# Patient Record
Sex: FEMALE | Race: WHITE | Employment: OTHER | URBAN - METROPOLITAN AREA
[De-identification: names, ages, dates, MRNs, and addresses within clinical notes are randomized per-mention and may not be internally consistent; named-entity substitution may affect disease eponyms.]

---

## 2017-01-19 ENCOUNTER — HOSPITAL ENCOUNTER (OUTPATIENT)
Dept: LAB | Age: 82
Discharge: HOME | End: 2017-01-19
Payer: MEDICARE

## 2017-01-19 PROCEDURE — 80053 COMPREHEN METABOLIC PANEL: CPT

## 2017-01-19 PROCEDURE — 83036 HEMOGLOBIN GLYCOSYLATED A1C: CPT

## 2017-01-19 PROCEDURE — 36415 COLL VENOUS BLD VENIPUNCTURE: CPT

## 2017-01-20 ENCOUNTER — HOSPITAL ENCOUNTER (OUTPATIENT)
Dept: LAB | Age: 82
Discharge: HOME | End: 2017-01-20
Payer: MEDICARE

## 2017-01-20 ENCOUNTER — OFFICE VISIT (OUTPATIENT)
Dept: INTERNAL MEDICINE CLINIC | Age: 82
End: 2017-01-20

## 2017-01-20 VITALS
RESPIRATION RATE: 20 BRPM | HEART RATE: 74 BPM | DIASTOLIC BLOOD PRESSURE: 76 MMHG | SYSTOLIC BLOOD PRESSURE: 142 MMHG | BODY MASS INDEX: 35.48 KG/M2 | WEIGHT: 187.9 LBS | TEMPERATURE: 97.9 F | OXYGEN SATURATION: 99 % | HEIGHT: 61 IN

## 2017-01-20 DIAGNOSIS — I95.9 HYPOTENSIVE EPISODE: ICD-10-CM

## 2017-01-20 DIAGNOSIS — E66.01 SEVERE OBESITY (BMI 35.0-39.9): ICD-10-CM

## 2017-01-20 DIAGNOSIS — K21.9 GASTROESOPHAGEAL REFLUX DISEASE WITHOUT ESOPHAGITIS: ICD-10-CM

## 2017-01-20 DIAGNOSIS — I10 HYPERTENSION, ESSENTIAL: ICD-10-CM

## 2017-01-20 DIAGNOSIS — R53.83 FATIGUE, UNSPECIFIED TYPE: Primary | ICD-10-CM

## 2017-01-20 DIAGNOSIS — R82.998 LEUKOCYTES IN URINE: ICD-10-CM

## 2017-01-20 LAB
ALBUMIN SERPL-MCNC: 4.2 G/DL (ref 3.5–4.8)
ALBUMIN/GLOB SERPL: 1.4 {RATIO} (ref 1.1–2.5)
ALP SERPL-CCNC: 87 IU/L (ref 39–117)
ALT SERPL-CCNC: 13 IU/L (ref 0–32)
AST SERPL-CCNC: 17 IU/L (ref 0–40)
BILIRUB SERPL-MCNC: 0.5 MG/DL (ref 0–1.2)
BILIRUB UR QL STRIP: NEGATIVE
BUN SERPL-MCNC: 20 MG/DL (ref 8–27)
BUN/CREAT SERPL: 16 (ref 11–26)
CALCIUM SERPL-MCNC: 10.1 MG/DL (ref 8.7–10.3)
CHLORIDE SERPL-SCNC: 93 MMOL/L (ref 96–106)
CO2 SERPL-SCNC: 29 MMOL/L (ref 18–29)
CREAT SERPL-MCNC: 1.27 MG/DL (ref 0.57–1)
EST. AVERAGE GLUCOSE BLD GHB EST-MCNC: 131 MG/DL
GLOBULIN SER CALC-MCNC: 3.1 G/DL (ref 1.5–4.5)
GLUCOSE SERPL-MCNC: 94 MG/DL (ref 65–99)
GLUCOSE UR-MCNC: NEGATIVE MG/DL
HBA1C MFR BLD: 6.2 % (ref 4.8–5.6)
KETONES P FAST UR STRIP-MCNC: NEGATIVE MG/DL
PH UR STRIP: 7 [PH] (ref 4.6–8)
POTASSIUM SERPL-SCNC: 3.7 MMOL/L (ref 3.5–5.2)
PROT SERPL-MCNC: 7.3 G/DL (ref 6–8.5)
PROT UR QL STRIP: NEGATIVE MG/DL
SODIUM SERPL-SCNC: 137 MMOL/L (ref 134–144)
SP GR UR STRIP: 1.01 (ref 1–1.03)
UA UROBILINOGEN AMB POC: NORMAL (ref 0.2–1)
URINALYSIS CLARITY POC: NORMAL
URINALYSIS COLOR POC: YELLOW
URINE BLOOD POC: NORMAL
URINE LEUKOCYTES POC: NORMAL
URINE NITRITES POC: NEGATIVE

## 2017-01-20 PROCEDURE — 87088 URINE BACTERIA CULTURE: CPT

## 2017-01-20 PROCEDURE — 87086 URINE CULTURE/COLONY COUNT: CPT

## 2017-01-20 PROCEDURE — 87077 CULTURE AEROBIC IDENTIFY: CPT

## 2017-01-20 PROCEDURE — 87186 SC STD MICRODIL/AGAR DIL: CPT

## 2017-01-20 RX ORDER — DOXYCYCLINE 100 MG/1
100 CAPSULE ORAL 2 TIMES DAILY
Qty: 14 CAP | Refills: 0 | Status: SHIPPED | OUTPATIENT
Start: 2017-01-20 | End: 2017-01-23 | Stop reason: SDUPTHER

## 2017-01-20 NOTE — PROGRESS NOTES
Seven Lemos is a 80 y.o. female who was seen in clinic today (1/20/2017). Assessment & Plan:  Charis Us was seen today for blood pressure check. Diagnoses and all orders for this visit:    Fatigue, unspecified type- new dx, reviewed differential, favor UTI. Will start on abx, will check UC.  -     AMB POC URINALYSIS DIP STICK AUTO W/O MICRO  -     doxycycline (VIBRAMYCIN) 100 mg capsule; Take 1 Cap by mouth two (2) times a day for 7 days. Hypotensive episode- unclear etiology but favor due to above, do not think it is related to her BP meds (her concerns about med side effect). Will cont off BP meds until it starts to increase. Will cont to monitor  -     doxycycline (VIBRAMYCIN) 100 mg capsule; Take 1 Cap by mouth two (2) times a day for 7 days. Hypertension, essential- see above    Severe obesity (BMI 35.0-39.9) (HCC)    Gastroesophageal reflux disease without esophagitis    Leukocytes in urine  -     CULTURE, URINE         Follow-up Disposition:  Return if symptoms worsen or fail to improve.       ----------------------------------------------------------------------    Subjective:  Cardiovascular Review  The patient has hypertension. Since last visit: she reports having several episodes of hypotension. She was at the dentist earlier this week. she felt lightheaded when she stood. They checked her BP and it was low. Then 2 days ago she felt weak in the knees. Took her BP several times and reports it was 79/48, 85/50, 91/52, and 95/54. This was between 10pm and 1am.  Next morning she reports 130/90 and 125/81 and she felt fine all day. She did not take her meds yesterday. BP today was L- 124/77 and R- 117/67. She did not take her BP meds today, but she does not feel good. She reports fatigue. Prior to Admission medications    Medication Sig Start Date End Date Taking?  Authorizing Provider   bumetanide (BUMEX) 1 mg tablet Take 1 tablet by mouth two  times daily 12/30/16  Yes Emily Aguilera MD   fluticasone [facility] [place] [place]) 50 mcg/actuation nasal spray Use 2 sprays in each  nostril every day 12/30/16  Yes Emily Aguilera MD   famotidine (PEPCID) 40 mg tablet Take 1 tablet by mouth  daily 12/30/16  Yes Emily Aguilera MD   busPIRone (BUSPAR) 5 mg tablet Take 1 tablet by mouth two  times daily 12/30/16  Yes Emily Aguilera MD   tiZANidine (ZANAFLEX) 2 mg tablet Take 1 tablet by mouth  nightly as needed 12/30/16  Yes Emily Aguilera MD   CINNAMON BARK (CINNAMON PO) Take 1,000 mg by mouth two (2) times a day. Yes Historical Provider   cranberry 500 mg capsule Take 1,000 mg by mouth as needed. Yes Historical Provider   fexofenadine (ALLEGRA) 180 mg tablet Take 90 mg by mouth daily. 1/2 tablet BID   Yes Historical Provider   Comp. Stocking,Knee,Regular,Lrg Misc 2 Each by Does Not Apply route daily. 6/10/13  Yes Emily Aguilera MD   aspirin 81 mg tablet Take 81 mg by mouth daily. 9/30/11  Yes Historical Provider   valsartan-hydroCHLOROthiazide (DIOVAN-HCT) 160-12.5 mg per tablet Take 1 tablet by mouth  daily 12/30/16   Emily Aguilera MD   clobetasol (TEMOVATE) 0.05 % topical cream Apply  to affected area daily as needed for Skin Irritation or Itching. 4/21/16   Emily Aguilera MD   nitroglycerin (NITROSTAT) 0.4 mg SL tablet 1 Tab by SubLINGual route every five (5) minutes as needed for Chest Pain.  5/18/15   Emily Aguilera MD          Allergies   Allergen Reactions    Bactrim [Sulfamethoprim Ds] Rash    Ciprocinonide Other (comments)     Pt states that she is not allergic to this medication    Macrobid [Nitrofurantoin Monohyd/M-Cryst] Unknown (comments)     Flu like      Naprosyn [Naproxen] Hives    Nsaids (Non-Steroidal Anti-Inflammatory Drug) Hives    Prozac [Fluoxetine] Hives    Statins-Hmg-Coa Reductase Inhibitors Myalgia     Did not tolerate simvastatin or atorvastatin    Sulfa (Sulfonamide Antibiotics) Hives    Vioxx [Rofecoxib] Hives GI Upset    Zoloft [Sertraline] Hives           Review of Systems   Constitutional: Negative for chills, fever, malaise/fatigue and weight loss. HENT: Negative for congestion and sore throat. Eyes: Negative for blurred vision. Respiratory: Negative for cough and shortness of breath. Cardiovascular: Negative for chest pain, palpitations and leg swelling. Gastrointestinal: Negative for abdominal pain, constipation, diarrhea, heartburn, nausea and vomiting. Genitourinary: Negative for frequency, hematuria and urgency. She reports having on/off sensation that she will get a UTI (pressure), normally treated w/ increase in water & resolves in a day. She has had this on/off this week   Musculoskeletal: Negative for joint pain and myalgias. Skin: Negative for rash. Neurological: Negative for tingling, focal weakness and headaches. Endo/Heme/Allergies: Does not bruise/bleed easily. Psychiatric/Behavioral: Negative for depression. The patient is not nervous/anxious and does not have insomnia. Objective:   Physical Exam   Constitutional: She appears well-developed. No distress. HENT:   Mouth/Throat: Mucous membranes are normal.   Eyes: Conjunctivae and lids are normal. No scleral icterus. Neck: Neck supple. No thyromegaly present. Cardiovascular: Regular rhythm and normal heart sounds. No murmur heard. Pulmonary/Chest: Effort normal and breath sounds normal. She has no wheezes. She has no rales. Abdominal: Soft. Bowel sounds are normal. She exhibits no mass. There is no hepatosplenomegaly. There is no tenderness. Musculoskeletal: She exhibits no edema. Lymphadenopathy:     She has no cervical adenopathy. Skin: No rash noted. Psychiatric: She has a normal mood and affect.  Her behavior is normal.         Visit Vitals    /76    Pulse 74    Temp 97.9 °F (36.6 °C) (Oral)    Resp 20    Ht 5' 1\" (1.549 m)    Wt 187 lb 14.4 oz (85.2 kg)    SpO2 99%    BMI 35.5 kg/m2         Disclaimer:  Advised her to call back or return to office if symptoms worsen/change/persist.  Discussed expected course/resolution/complications of diagnosis in detail with patient. Medication risks/benefits/costs/interactions/alternatives discussed with patient. She was given an after visit summary which includes diagnoses, current medications, & vitals. She expressed understanding with the diagnosis and plan.         Medardo Flynn MD

## 2017-01-20 NOTE — PROGRESS NOTES
Blood pressure went low on Wednesday, 79/48 to 95/54. Held BP med on Thursday. /77 this morning. Patient instructed per Dr. Gavin Bo, will send urine for culture, Dr. Gavin Bo will review chart today and send Abx to pharmacy, she should hold her BP medication for now, let us know if it increases, and to schedule follow up for 10-14 days. Patient verbalized understanding.

## 2017-01-23 ENCOUNTER — TELEPHONE (OUTPATIENT)
Dept: INTERNAL MEDICINE CLINIC | Age: 82
End: 2017-01-23

## 2017-01-23 RX ORDER — DOXYCYCLINE 100 MG/1
100 CAPSULE ORAL 2 TIMES DAILY
Qty: 14 CAP | Refills: 0 | Status: SHIPPED | OUTPATIENT
Start: 2017-01-23 | End: 2017-01-30

## 2017-01-24 ENCOUNTER — TELEPHONE (OUTPATIENT)
Dept: INTERNAL MEDICINE CLINIC | Age: 82
End: 2017-01-24

## 2017-01-24 LAB — BACTERIA UR CULT: ABNORMAL

## 2017-01-24 NOTE — TELEPHONE ENCOUNTER
Stop the doxycycline. Based on her allergy list I would wait to get the UC before putting her on another abx. Should be another 1-2 days.  Please add this to allergy list.

## 2017-01-24 NOTE — TELEPHONE ENCOUNTER
Spoke with pt who states the doxycycline is not agreeing with her. She states she does not feel good all over all she can do is lay down. She is too sick to go anywhere. Advised pt not to take anymore and wait for Dr Cecile Bello to advise. Pt verbalized understanding. hector

## 2017-01-25 ENCOUNTER — TELEPHONE (OUTPATIENT)
Dept: INTERNAL MEDICINE CLINIC | Age: 82
End: 2017-01-25

## 2017-01-25 RX ORDER — CIPROFLOXACIN 500 MG/1
TABLET ORAL
Qty: 10 TAB | Refills: 0 | Status: SHIPPED | OUTPATIENT
Start: 2017-01-25 | End: 2017-02-01 | Stop reason: ALTCHOICE

## 2017-01-25 NOTE — PROGRESS NOTES
Spoke with patient  Who stated she was had a reaction to Cipro 25 years ago  And after talking to Janki. Gibson Julian he stated for her to ahead and take it. She is to call the office if she has any type of reaction.

## 2017-01-25 NOTE — PROGRESS NOTES
Please call patient. Did have a UTI. resistent to multiple abx & due to her allergies we have limited options. I have her allergic to ciprocinidine, but also a comment that she is not allergic to this. Please clarify.   If this is accurate then start ciprofloxacin 500mg, 1 tab PO BID x 5 days, #10, RF 0

## 2017-01-26 ENCOUNTER — TELEPHONE (OUTPATIENT)
Dept: INTERNAL MEDICINE CLINIC | Age: 82
End: 2017-01-26

## 2017-01-26 NOTE — TELEPHONE ENCOUNTER
She did have a UTI. I think she needs to be treated to prevent this from getting worse. These side effects are more tolerable then the doxycycline. I would recommend continuing the medication to make sure we completed eliminate the infection. She should be on abx for a total of 7 days.

## 2017-01-27 NOTE — TELEPHONE ENCOUNTER
Spoke with pt who states she cannot take the Cipro that made her feel worse than the doxycycline. She states she does not feel that great, she feels exhausted on the doxycycline but would rather take that than the cipro which make her feel worse. Pt has appt on 02/3/17 . Advised pt if she feels really bad just does not feel right to go to Ed. Pt verbalized understanding.

## 2017-01-27 NOTE — TELEPHONE ENCOUNTER
Spoke with pt and notified her that Dr Gavin Bo states doxycyline would be better for her to take due to the side effects that pt can tolerate. Pt verbalized understanding and stated she would take it and realized she has just 5 days left to take it.  Pt stated will make follow up visit for beginning of Feb.

## 2017-01-31 ENCOUNTER — TELEPHONE (OUTPATIENT)
Dept: INTERNAL MEDICINE CLINIC | Age: 82
End: 2017-01-31

## 2017-01-31 DIAGNOSIS — R53.83 FATIGUE, UNSPECIFIED TYPE: Primary | ICD-10-CM

## 2017-02-01 ENCOUNTER — HOSPITAL ENCOUNTER (OUTPATIENT)
Dept: GENERAL RADIOLOGY | Age: 82
Discharge: HOME | End: 2017-02-01
Payer: MEDICARE

## 2017-02-01 ENCOUNTER — HOSPITAL ENCOUNTER (OUTPATIENT)
Dept: LAB | Age: 82
Discharge: HOME | End: 2017-02-01
Payer: MEDICARE

## 2017-02-01 ENCOUNTER — OFFICE VISIT (OUTPATIENT)
Dept: INTERNAL MEDICINE CLINIC | Age: 82
End: 2017-02-01

## 2017-02-01 VITALS
RESPIRATION RATE: 18 BRPM | HEART RATE: 72 BPM | OXYGEN SATURATION: 96 % | SYSTOLIC BLOOD PRESSURE: 140 MMHG | TEMPERATURE: 98 F | DIASTOLIC BLOOD PRESSURE: 66 MMHG

## 2017-02-01 DIAGNOSIS — E83.52 HYPERCALCEMIA: ICD-10-CM

## 2017-02-01 DIAGNOSIS — R68.83 CHILLS (WITHOUT FEVER): ICD-10-CM

## 2017-02-01 DIAGNOSIS — R53.83 FATIGUE, UNSPECIFIED TYPE: ICD-10-CM

## 2017-02-01 DIAGNOSIS — R82.90 ABNORMAL URINALYSIS: ICD-10-CM

## 2017-02-01 DIAGNOSIS — N12 PYELONEPHRITIS: ICD-10-CM

## 2017-02-01 DIAGNOSIS — R53.83 FATIGUE, UNSPECIFIED TYPE: Primary | ICD-10-CM

## 2017-02-01 LAB
BILIRUB UR QL STRIP: NEGATIVE
GLUCOSE UR-MCNC: NEGATIVE MG/DL
KETONES P FAST UR STRIP-MCNC: NEGATIVE MG/DL
PH UR STRIP: 5.5 [PH] (ref 4.6–8)
PROT UR QL STRIP: NEGATIVE MG/DL
SP GR UR STRIP: 1 (ref 1–1.03)
UA UROBILINOGEN AMB POC: NORMAL (ref 0.2–1)
URINALYSIS CLARITY POC: NORMAL
URINALYSIS COLOR POC: YELLOW
URINE BLOOD POC: NORMAL
URINE LEUKOCYTES POC: NORMAL
URINE NITRITES POC: POSITIVE

## 2017-02-01 PROCEDURE — 36415 COLL VENOUS BLD VENIPUNCTURE: CPT

## 2017-02-01 PROCEDURE — 87186 SC STD MICRODIL/AGAR DIL: CPT

## 2017-02-01 PROCEDURE — 71020 XR CHEST PA LAT: CPT

## 2017-02-01 PROCEDURE — 85025 COMPLETE CBC W/AUTO DIFF WBC: CPT

## 2017-02-01 PROCEDURE — 87077 CULTURE AEROBIC IDENTIFY: CPT

## 2017-02-01 PROCEDURE — 87086 URINE CULTURE/COLONY COUNT: CPT

## 2017-02-01 PROCEDURE — 80048 BASIC METABOLIC PNL TOTAL CA: CPT

## 2017-02-01 PROCEDURE — 87088 URINE BACTERIA CULTURE: CPT

## 2017-02-01 NOTE — PATIENT INSTRUCTIONS
Fatigue: Care Instructions  Your Care Instructions  Fatigue is a feeling of tiredness, exhaustion, or lack of energy. You may feel fatigue because of too much or not enough activity. It can also come from stress, lack of sleep, boredom, and poor diet. Many medical problems, such as viral infections, can cause fatigue. Emotional problems, especially depression, are often the cause of fatigue. Fatigue is most often a symptom of another problem. Treatment for fatigue depends on the cause. For example, if you have fatigue because you have a certain health problem, treating this problem also treats your fatigue. If depression or anxiety is the cause, treatment may help. Follow-up care is a key part of your treatment and safety. Be sure to make and go to all appointments, and call your doctor if you are having problems. It's also a good idea to know your test results and keep a list of the medicines you take. How can you care for yourself at home? · Get regular exercise. But don't overdo it. Go back and forth between rest and exercise. · Get plenty of rest.  · Eat a healthy diet. Do not skip meals, especially breakfast.  · Reduce your use of caffeine, tobacco, and alcohol. Caffeine is most often found in coffee, tea, cola drinks, and chocolate. · Limit medicines that can cause fatigue. This includes tranquilizers and cold and allergy medicines. When should you call for help? Watch closely for changes in your health, and be sure to contact your doctor if:  · You have new symptoms such as fever or a rash. · Your fatigue gets worse. · You have been feeling down, depressed, or hopeless. Or you may have lost interest in things that you usually enjoy. · You are not getting better as expected. Where can you learn more? Go to http://franny-shukri.info/. Enter R843 in the search box to learn more about \"Fatigue: Care Instructions. \"  Current as of: May 27, 2016  Content Version: 11.1  © 2246-1000 [employer]. Care instructions adapted under license by [employer] (which disclaims liability or warranty for this information). If you have questions about a medical condition or this instruction, always ask your healthcare professional. [place] [address] any warranty or liability for your use of this information.

## 2017-02-01 NOTE — TELEPHONE ENCOUNTER
ON CALL NOTE:  Pt called reporting fatigue and feeling \"bad\". Has been taking abx. Will stop at this time. Will have her RTC for labs tomorrow. Has appt to f/u with me on Friday. Red flags were reviewed & discussed with the patient to notify me or go to the ED. She verbalized understanding.

## 2017-02-01 NOTE — PROGRESS NOTES
Nima Willis is a 80 y.o. female who was seen in clinic today (2/1/2017). Patient was seen with Dr Rebecca Campbell (R3 at [facility]). Assessment & Plan:  Noé Peña was seen today for fatigue. Diagnoses and all orders for this visit:    Fatigue, unspecified type- worsening, reviewed differential and no obvious cause. UTI should have been treated but UA is still abnormal so will resend UC. Will repeat labs to see if any changes. May need to consider abd/pelvic CT scan. -     CBC WITH AUTOMATED DIFF  -     METABOLIC PANEL, BASIC  -     XR CHEST PA LAT; Future  -     AMB POC URINALYSIS DIP STICK AUTO W/O MICRO    Abnormal urinalysis  -     CULTURE, URINE         Follow-up Disposition:  Return if symptoms worsen or fail to improve.       ----------------------------------------------------------------------    Subjective:  Fatigue  Patient complains of fatigue. She was seen on 1/20. Had a UTI. She was given doxycycline, had issues then switched to ciprofloxacin. She did not tolerate this so went back to doxycycline. She called me last night, notes reviewed. She reports nauseated and just feels bad. She reports having chills in the evening, no sweats. Also reports a new cough. When pain gets really bad she does feel some chest pains. She reports urinary issues have fluctuated. She reports feeling the worst she has ever felt. Prior to Admission medications    Medication Sig Start Date End Date Taking?  Authorizing Provider   bumetanide (BUMEX) 1 mg tablet Take 1 tablet by mouth two  times daily 12/30/16  Yes Bharati Marin MD   valsartan-hydroCHLOROthiazide (DIOVAN-HCT) 160-12.5 mg per tablet Take 1 tablet by mouth  daily 12/30/16  Yes Bharati Marin MD   fluticasone [facility]) 50 mcg/actuation nasal spray Use 2 sprays in each  nostril every day 12/30/16  Yes Bharati Marin MD   famotidine (PEPCID) 40 mg tablet Take 1 tablet by mouth  daily 12/30/16  Yes MD Deacon Brewster (BUSPAR) 5 mg tablet Take 1 tablet by mouth two  times daily 12/30/16  Yes Chiara Lora MD   tiZANidine (ZANAFLEX) 2 mg tablet Take 1 tablet by mouth  nightly as needed 12/30/16  Yes Chiara Lora MD   clobetasol (TEMOVATE) 0.05 % topical cream Apply  to affected area daily as needed for Skin Irritation or Itching. 4/21/16  Yes Chiara Lora MD   nitroglycerin (NITROSTAT) 0.4 mg SL tablet 1 Tab by SubLINGual route every five (5) minutes as needed for Chest Pain. 5/18/15  Yes Chiara Lora MD   CINNAMON BARK (CINNAMON PO) Take 1,000 mg by mouth two (2) times a day. Yes Historical Provider   cranberry 500 mg capsule Take 1,000 mg by mouth as needed. Yes Historical Provider   fexofenadine (ALLEGRA) 180 mg tablet Take 90 mg by mouth daily. 1/2 tablet BID   Yes Historical Provider   Comp. Stocking,Knee,Regular,Lrg Misc 2 Each by Does Not Apply route daily. 6/10/13  Yes Chiara Lora MD   aspirin 81 mg tablet Take 81 mg by mouth daily. 9/30/11  Yes Historical Provider          Allergies   Allergen Reactions    Bactrim [Sulfamethoprim Ds] Rash    Ciprocinonide Other (comments)     Pt states that she is not allergic to this medication    Macrobid [Nitrofurantoin Monohyd/M-Cryst] Unknown (comments)     Flu like      Naprosyn [Naproxen] Hives    Nsaids (Non-Steroidal Anti-Inflammatory Drug) Hives    Prozac [Fluoxetine] Hives    Statins-Hmg-Coa Reductase Inhibitors Myalgia     Did not tolerate simvastatin or atorvastatin    Sulfa (Sulfonamide Antibiotics) Hives    Vioxx [Rofecoxib] Hives     GI Upset    Zoloft [Sertraline] Hives           Review of Systems   Constitutional: Positive for chills and malaise/fatigue. Negative for diaphoresis and fever. Respiratory: Positive for cough. Negative for shortness of breath. Cardiovascular: Positive for chest pain. Negative for palpitations. Gastrointestinal: Negative for constipation, diarrhea, nausea and vomiting. Genitourinary: Negative for frequency, hematuria and urgency. Musculoskeletal: Negative for back pain. Skin: Negative for rash. Objective:   Physical Exam   Constitutional: No distress. HENT:   Right Ear: Tympanic membrane is not erythematous and not bulging. No middle ear effusion. Left Ear: Tympanic membrane is not erythematous and not bulging. No middle ear effusion. Nose: No mucosal edema or rhinorrhea. Right sinus exhibits no maxillary sinus tenderness and no frontal sinus tenderness. Left sinus exhibits no maxillary sinus tenderness and no frontal sinus tenderness. Mouth/Throat: Uvula is midline and mucous membranes are normal. No oropharyngeal exudate or posterior oropharyngeal erythema. Eyes: Conjunctivae are normal. No scleral icterus. Neck: Neck supple. Cardiovascular: Regular rhythm and normal heart sounds. No murmur heard. Pulmonary/Chest: Effort normal and breath sounds normal. She has no wheezes. She has no rales. Abdominal: Bowel sounds are normal. She exhibits no mass. There is no hepatosplenomegaly. There is no tenderness. Musculoskeletal: She exhibits no edema. Lymphadenopathy:     She has no cervical adenopathy. Psychiatric: She has a normal mood and affect. Her behavior is normal.         Visit Vitals    /66    Pulse 72    Temp 98 °F (36.7 °C) (Oral)    Resp 18    SpO2 96%         Disclaimer:  Advised her to call back or return to office if symptoms worsen/change/persist.  Discussed expected course/resolution/complications of diagnosis in detail with patient. Medication risks/benefits/costs/interactions/alternatives discussed with patient. She was given an after visit summary which includes diagnoses, current medications, & vitals. She expressed understanding with the diagnosis and plan.         Nissa Madrid MD

## 2017-02-01 NOTE — LETTER
2/3/2017 8:44 AM 
 
Ms. Nkechi [facility] [id number] Dear Ms. Ed Mcguire Your lab slip is enclosed as discussed. Sincerely, Thalia Webster RN

## 2017-02-02 LAB
BASOPHILS # BLD AUTO: 0.1 X10E3/UL (ref 0–0.2)
BASOPHILS NFR BLD AUTO: 1 %
BUN SERPL-MCNC: 29 MG/DL (ref 8–27)
BUN/CREAT SERPL: 29 (ref 11–26)
CALCIUM SERPL-MCNC: 11.2 MG/DL (ref 8.7–10.3)
CHLORIDE SERPL-SCNC: 86 MMOL/L (ref 96–106)
CO2 SERPL-SCNC: 29 MMOL/L (ref 18–29)
CREAT SERPL-MCNC: 1.01 MG/DL (ref 0.57–1)
EOSINOPHIL # BLD AUTO: 0.8 X10E3/UL (ref 0–0.4)
EOSINOPHIL NFR BLD AUTO: 8 %
ERYTHROCYTE [DISTWIDTH] IN BLOOD BY AUTOMATED COUNT: 13.6 % (ref 12.3–15.4)
GLUCOSE SERPL-MCNC: 95 MG/DL (ref 65–99)
HCT VFR BLD AUTO: 35.6 % (ref 34–46.6)
HGB BLD-MCNC: 12.3 G/DL (ref 11.1–15.9)
IMM GRANULOCYTES # BLD: 0 X10E3/UL (ref 0–0.1)
IMM GRANULOCYTES NFR BLD: 0 %
LYMPHOCYTES # BLD AUTO: 3.4 X10E3/UL (ref 0.7–3.1)
LYMPHOCYTES NFR BLD AUTO: 34 %
MCH RBC QN AUTO: 29.9 PG (ref 26.6–33)
MCHC RBC AUTO-ENTMCNC: 34.6 G/DL (ref 31.5–35.7)
MCV RBC AUTO: 87 FL (ref 79–97)
MONOCYTES # BLD AUTO: 1 X10E3/UL (ref 0.1–0.9)
MONOCYTES NFR BLD AUTO: 10 %
NEUTROPHILS # BLD AUTO: 4.7 X10E3/UL (ref 1.4–7)
NEUTROPHILS NFR BLD AUTO: 47 %
PLATELET # BLD AUTO: 350 X10E3/UL (ref 150–379)
POTASSIUM SERPL-SCNC: 3.3 MMOL/L (ref 3.5–5.2)
RBC # BLD AUTO: 4.11 X10E6/UL (ref 3.77–5.28)
SODIUM SERPL-SCNC: 133 MMOL/L (ref 134–144)
WBC # BLD AUTO: 10 X10E3/UL (ref 3.4–10.8)

## 2017-02-02 NOTE — PROGRESS NOTES
Please call patient. CBC normal.  Renal fxn improved. Multiple electrolyte changes (Ca, K, and Na). I'm sure this is related to her not feeling good (possible medication side effect). Would repeat CMP next week (dx: hypercalcemia).   I would recommend CT abd/pelvis (dx: r/o pyelonephritis)

## 2017-02-03 ENCOUNTER — TELEPHONE (OUTPATIENT)
Dept: INTERNAL MEDICINE CLINIC | Age: 82
End: 2017-02-03

## 2017-02-03 LAB — BACTERIA UR CULT: ABNORMAL

## 2017-02-03 NOTE — PROGRESS NOTES
Call to patient. Advised of Dr. Olvin Mir' note and recommendations. Request I mail lab slip to her. Advised I would call to schedule CT for Monday. She would like to have done at [facility].

## 2017-02-03 NOTE — PROGRESS NOTES
CT scan scheduled for Monday, 2 pm at [facility], [address], [place]. She will arrive 2 hours early for contrast. No Nsaids. NPO 4 hours prior to procedure. Drink clear liquids. Patient verbalized understanding.

## 2017-02-03 NOTE — TELEPHONE ENCOUNTER
Patient called and reviewed previous & current UC. UC is unchanged. We have no PO options due to allergies & medications she can't tolerate (recently treated w/ ciprofloxacin and doxycyline). Recommended to head to [facility] [place] for admission & IV abx. Reviewed I can't directly admit her since she was not seen today. She wants to sleep on it, feels slightly better. Will cancel CT scan. Favor her symptoms are mostly UTI related.

## 2017-02-03 NOTE — PROGRESS NOTES
Please call patient. UC growing same bacteria as last time. Due to allergies only treatments are IM/IV. We could try levofloxacin (but did not tolerate ciprofloxacin). Could do Ceftriaxone IM in the office but could not start until Monday. Would recommend going to [facility] [place], admission for IV abx.   Favor all her symptoms are UTI related

## 2017-02-04 ENCOUNTER — HOSPITAL ENCOUNTER (EMERGENCY)
Age: 82
Discharge: HOME | End: 2017-02-04
Attending: EMERGENCY MEDICINE
Payer: MEDICARE

## 2017-02-04 ENCOUNTER — TELEPHONE (OUTPATIENT)
Dept: INTERNAL MEDICINE CLINIC | Age: 82
End: 2017-02-04

## 2017-02-04 VITALS
SYSTOLIC BLOOD PRESSURE: 125 MMHG | RESPIRATION RATE: 18 BRPM | TEMPERATURE: 97.8 F | OXYGEN SATURATION: 99 % | BODY MASS INDEX: 34.93 KG/M2 | WEIGHT: 185 LBS | DIASTOLIC BLOOD PRESSURE: 59 MMHG | HEART RATE: 60 BPM | HEIGHT: 61 IN

## 2017-02-04 DIAGNOSIS — R82.71 BACTERIURIA WITH PYURIA: ICD-10-CM

## 2017-02-04 DIAGNOSIS — E87.6 HYPOKALEMIA: Primary | ICD-10-CM

## 2017-02-04 DIAGNOSIS — R82.81 BACTERIURIA WITH PYURIA: ICD-10-CM

## 2017-02-04 LAB
ALBUMIN SERPL BCP-MCNC: 4.3 G/DL (ref 3.5–5)
ALBUMIN/GLOB SERPL: 1 {RATIO} (ref 1.1–2.2)
ALP SERPL-CCNC: 105 U/L (ref 45–117)
ALT SERPL-CCNC: 22 U/L (ref 12–78)
ANION GAP BLD CALC-SCNC: 10 MMOL/L (ref 5–15)
APPEARANCE UR: CLEAR
AST SERPL W P-5'-P-CCNC: 24 U/L (ref 15–37)
BACTERIA URNS QL MICRO: ABNORMAL /HPF
BASOPHILS # BLD AUTO: 0.1 K/UL (ref 0–0.1)
BASOPHILS # BLD: 1 % (ref 0–1)
BILIRUB SERPL-MCNC: 0.8 MG/DL (ref 0.2–1)
BILIRUB UR QL: NEGATIVE
BUN SERPL-MCNC: 34 MG/DL (ref 6–20)
BUN/CREAT SERPL: 29 (ref 12–20)
CALCIUM SERPL-MCNC: 9.9 MG/DL (ref 8.5–10.1)
CHLORIDE SERPL-SCNC: 90 MMOL/L (ref 97–108)
CO2 SERPL-SCNC: 34 MMOL/L (ref 21–32)
COLOR UR: ABNORMAL
CREAT SERPL-MCNC: 1.16 MG/DL (ref 0.55–1.02)
DIFFERENTIAL METHOD BLD: ABNORMAL
EOSINOPHIL # BLD: 0.5 K/UL (ref 0–0.4)
EOSINOPHIL NFR BLD: 6 % (ref 0–7)
EPITH CASTS URNS QL MICRO: ABNORMAL /LPF
ERYTHROCYTE [DISTWIDTH] IN BLOOD BY AUTOMATED COUNT: 13.2 % (ref 11.5–14.5)
GLOBULIN SER CALC-MCNC: 4.1 G/DL (ref 2–4)
GLUCOSE SERPL-MCNC: 99 MG/DL (ref 65–100)
GLUCOSE UR STRIP.AUTO-MCNC: NEGATIVE MG/DL
HCT VFR BLD AUTO: 36.1 % (ref 35–47)
HGB BLD-MCNC: 12.3 G/DL (ref 11.5–16)
HGB UR QL STRIP: ABNORMAL
HYALINE CASTS URNS QL MICRO: ABNORMAL /LPF (ref 0–5)
KETONES UR QL STRIP.AUTO: NEGATIVE MG/DL
LEUKOCYTE ESTERASE UR QL STRIP.AUTO: ABNORMAL
LYMPHOCYTES # BLD AUTO: 33 % (ref 12–49)
LYMPHOCYTES # BLD: 2.9 K/UL (ref 0.8–3.5)
MCH RBC QN AUTO: 30.3 PG (ref 26–34)
MCHC RBC AUTO-ENTMCNC: 34.1 G/DL (ref 30–36.5)
MCV RBC AUTO: 88.9 FL (ref 80–99)
MONOCYTES # BLD: 0.9 K/UL (ref 0–1)
MONOCYTES NFR BLD AUTO: 10 % (ref 5–13)
NEUTS SEG # BLD: 4.3 K/UL (ref 1.8–8)
NEUTS SEG NFR BLD AUTO: 50 % (ref 32–75)
NITRITE UR QL STRIP.AUTO: NEGATIVE
PH UR STRIP: 6.5 [PH] (ref 5–8)
PLATELET # BLD AUTO: 351 K/UL (ref 150–400)
POTASSIUM SERPL-SCNC: 2.9 MMOL/L (ref 3.5–5.1)
PROT SERPL-MCNC: 8.4 G/DL (ref 6.4–8.2)
PROT UR STRIP-MCNC: NEGATIVE MG/DL
RBC # BLD AUTO: 4.06 M/UL (ref 3.8–5.2)
RBC #/AREA URNS HPF: ABNORMAL /HPF (ref 0–5)
RBC MORPH BLD: ABNORMAL
SODIUM SERPL-SCNC: 134 MMOL/L (ref 136–145)
SP GR UR REFRACTOMETRY: 1.01 (ref 1–1.03)
UA: UC IF INDICATED,UAUC: ABNORMAL
UROBILINOGEN UR QL STRIP.AUTO: 0.2 EU/DL (ref 0.2–1)
WBC # BLD AUTO: 8.7 K/UL (ref 3.6–11)
WBC MORPH BLD: ABNORMAL
WBC URNS QL MICRO: ABNORMAL /HPF (ref 0–4)

## 2017-02-04 PROCEDURE — 36415 COLL VENOUS BLD VENIPUNCTURE: CPT | Performed by: EMERGENCY MEDICINE

## 2017-02-04 PROCEDURE — 87086 URINE CULTURE/COLONY COUNT: CPT | Performed by: EMERGENCY MEDICINE

## 2017-02-04 PROCEDURE — 80053 COMPREHEN METABOLIC PANEL: CPT | Performed by: EMERGENCY MEDICINE

## 2017-02-04 PROCEDURE — 99283 EMERGENCY DEPT VISIT LOW MDM: CPT

## 2017-02-04 PROCEDURE — 87186 SC STD MICRODIL/AGAR DIL: CPT | Performed by: EMERGENCY MEDICINE

## 2017-02-04 PROCEDURE — 85025 COMPLETE CBC W/AUTO DIFF WBC: CPT | Performed by: EMERGENCY MEDICINE

## 2017-02-04 PROCEDURE — 81001 URINALYSIS AUTO W/SCOPE: CPT | Performed by: EMERGENCY MEDICINE

## 2017-02-04 RX ORDER — POTASSIUM CHLORIDE 20 MEQ/1
20 TABLET, EXTENDED RELEASE ORAL 2 TIMES DAILY
Qty: 30 TAB | Refills: 0 | Status: SHIPPED | OUTPATIENT
Start: 2017-02-04 | End: 2017-02-19

## 2017-02-04 NOTE — ED TRIAGE NOTES
Referred by Dr. Aylin Childress to get IV antibiotic for UTI.  \"I've been on 2 antibiotics but can't complete because it makes me sick\"

## 2017-02-04 NOTE — ED PROVIDER NOTES
HPI Comments: 80 y.o. female with past medical history significant for gout, arthritis, HTN, GERD, colonic polyps, hypercholesterolemia, plantar fascitis, chronic fatigue, chronic anxiety, CAD, ABIODUN, MARY JANE, and CHI who presents to the ED with chief complaint of referral. Patient reports chest tightness with recent onset. Patient reports a UTI with onset ~2-3 weeks ago. Patient reports being treated with antibiotics by her PCP, with no relief. Patient reports being referred to the ED by her PCP for IV antibiotics. Patient reports needing to urinate ~2-3 times per night at baseline. Patient reports a history of a cardiac stent placement ~5 years ago; reports the procedure was performed by Dr. Qiana Jarquin MD. Patient reports being on HTN and diuretic medications. Patient reports CPAP use at night. Patient reports decreased sleep \"last night. \" Patient denies fever, dysuria, hematuria, and urinary frequency. There are no other acute medical concerns at this time. 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  Hx tonsil and adenoidectomy      Hx heent  3/2002     jarod. laser eye surg.     Hx orthopaedic       Right wrist cyst    Endoscopy, colon, diagnostic  5/1999     ([facility])    Hx mohs procedure Right 4/28/04    Hx heart catheterization  2/12     s/p stent    Hx other surgical  5/15/14     PAP- normal         Family History:   Problem Relation Age of Onset    Coronary Artery Disease Mother     Hypertension Mother     Heart Failure Mother      CHF    Diabetes Father     Heart Disease Father     Hypertension Sister     Heart Disease Sister     Stroke Sister     Heart Surgery Sister     Heart Disease Brother     Diabetes Brother     No Known Problems Daughter     No Known Problems Daughter     No Known Problems Daughter     No Known Problems Son        Social History     Social History    Marital status:      Spouse name: N/A    Number of children: N/A    Years of education: N/A     Occupational History    Not on file. Social History Main Topics    Smoking status: Former Smoker     Packs/day: 0.30     Years: 5.00     Quit date: 7/2/1970    Smokeless tobacco: Never Used    Alcohol use No    Drug use: No    Sexual activity: No     Other Topics Concern    Not on file     Social History Narrative         ALLERGIES: Bactrim [sulfamethoprim ds]; Ciprocinonide; Macrobid [nitrofurantoin monohyd/m-cryst]; Naprosyn [naproxen]; Nsaids (non-steroidal anti-inflammatory drug); Prozac [fluoxetine]; Statins-hmg-coa reductase inhibitors; Sulfa (sulfonamide antibiotics); Vioxx [rofecoxib]; and Zoloft [sertraline]    Review of Systems   Constitutional: Negative for appetite change, chills and fever. HENT: Negative for rhinorrhea, sore throat and trouble swallowing. Eyes: Negative for photophobia. Respiratory: Positive for chest tightness. Negative for cough and shortness of breath. Cardiovascular: Negative for palpitations. Gastrointestinal: Negative for abdominal pain, nausea and vomiting. Genitourinary: Negative for dysuria, frequency and hematuria. Musculoskeletal: Negative for arthralgias. Neurological: Negative for dizziness, syncope and weakness.    Psychiatric/Behavioral: Negative for behavioral problems. The patient is not nervous/anxious. All other systems reviewed and are negative. Vitals:    02/04/17 1336   BP: 148/74   Pulse: 65   Resp: 17   Temp: 97.8 °F (36.6 °C)   SpO2: 99%   Weight: 83.9 kg (185 lb)   Height: 5' 1\" (1.549 m)            Physical Exam   Physical Exam   Constitutional: She appears well-developed and well-nourished. HENT:   Head: Normocephalic and atraumatic. Mouth/Throat: Oropharynx is clear and moist.   Eyes: EOM are normal. Pupils are equal, round, and reactive to light. Neck: Normal range of motion. Neck supple. Cardiovascular: Normal rate, regular rhythm, normal heart sounds and intact distal pulses. Exam reveals no gallop and no friction rub. No murmur heard. Pulmonary/Chest: Effort normal. No respiratory distress. She has no wheezes. She has no rales. Abdominal: Soft. There is no tenderness. There is no rebound. Musculoskeletal: Normal range of motion. She exhibits no tenderness. Neurological: She is alert. No cranial nerve deficit. Motor; symmetric   Skin: No erythema. Psychiatric: She has a normal mood and affect. Her behavior is normal.   Nursing note and vitals reviewed. Note written by Karine Srivastava, as dictated by Alex Solares MD 2:23 PM   [facility]  ED Course       Procedures    Note: Patient had some urgency several weeks ago and took at least one course of antibiotics. Most recently her urine culture grew out Enterobacter which is susceptible to cephalosporins. Symptoms today include insomnia and a feeling like she may jump out of her skin. She has baseline nocturia x3. She went into the bathroom several times during the night with the urge to urinate and did not urinate. At this time she seems to have minimal symptoms associated with her urinary tract. I feel like her infection may be asymptomatic. Labs and urine are pending but I think we can hold off on giving her antibiotics at this time.   Joni Greenfield Nahomy Benavides MD  2:21 PM

## 2017-02-04 NOTE — DISCHARGE INSTRUCTIONS
Hypokalemia: Care Instructions  Your Care Instructions  Hypokalemia (say \"ed-gt-hop-ALIVIA-geena-uh\") is a low level of potassium. The heart, muscles, kidneys, and nervous system all need potassium to work well. This problem has many different causes. Kidney problems, diet, and medicines like diuretics and laxatives can cause it. So can vomiting or diarrhea. In some cases, cancer is the cause. Your doctor may do tests to find the cause of your low potassium levels. You may need medicines to bring your potassium levels back to normal. You may also need regular blood tests to check your potassium. If you have very low potassium, you may need intravenous (IV) medicines. You also may need tests to check the electrical activity of your heart. Heart problems caused by low potassium levels can be very serious. Follow-up care is a key part of your treatment and safety. Be sure to make and go to all appointments, and call your doctor if you are having problems. It's also a good idea to know your test results and keep a list of the medicines you take. How can you care for yourself at home? · If your doctor recommends it, eat foods that have a lot of potassium. These include fresh fruits, juices, and vegetables. They also include nuts, beans, and milk. · Be safe with medicines. If your doctor prescribes medicines or potassium supplements, take them exactly as directed. Call your doctor if you have any problems with your medicines. · Get your potassium levels tested as often as your doctor tells you. When should you call for help? Call 911 anytime you think you may need emergency care. For example, call if:  · You feel like your heart is missing beats. Heart problems caused by low potassium can cause death. · You passed out (lost consciousness). · You have a seizure. Call your doctor now or seek immediate medical care if:  · You feel weak or unusually tired. · You have severe arm or leg cramps.   · You have tingling or numbness. · You feel sick to your stomach, or you vomit. · You have belly cramps. · You feel bloated or constipated. · You have to urinate a lot. · You feel very thirsty most of the time. · You are dizzy or lightheaded, or you feel like you may faint. · You feel depressed, or you lose touch with reality. Watch closely for changes in your health, and be sure to contact your doctor if:  · You do not get better as expected. Where can you learn more? Go to http://franny-shukri.info/. Enter G358 in the search box to learn more about \"Hypokalemia: Care Instructions. \"  Current as of: July 28, 2016  Content Version: 11.1  © 1597-6514 [employer]. Care instructions adapted under license by [facility] [employer] (which disclaims liability or warranty for this information). If you have questions about a medical condition or this instruction, always ask your healthcare professional. [place] [address] any warranty or liability for your use of this information. We hope that we have addressed all of your medical concerns. The examination and treatment you received in the Emergency Department were for an emergent problem and were not intended as complete care. It is important that you follow up with your healthcare provider(s) for ongoing care. If your symptoms worsen or do not improve as expected, and you are unable to reach your usual health care provider(s), you should return to the Emergency Department. Today's healthcare is undergoing tremendous change, and patient satisfaction surveys are one of the many tools to assess the quality of medical care. You may receive a survey from the [employer] organization regarding your experience in the Emergency Department. I hope that your experience has been completely positive, particularly the medical care that I provided.   As such, please participate in the survey; anything less than excellent does not meet my expectations or intentions. [facility] and [address] [place] [address] participate in nationally recognized quality of care measures. If your blood pressure is greater than 120/80, as reported below, we urge that you seek medical care to address the potential of high blood pressure, commonly known as hypertension. Hypertension can be hereditary or can be caused by certain medical conditions, pain, stress, or \"white coat syndrome. \"       Please make an appointment with your health care provider(s) for follow up of your Emergency Department visit. VITALS:   Patient Vitals for the past 8 hrs:   Temp Pulse Resp BP SpO2   02/04/17 1336 97.8 °F (36.6 °C) 65 17 148/74 99 %          Thank you for allowing us to provide you with medical care today. We realize that you have many choices for your emergency care needs. Please choose us in the future for any continued health care needs. Alayne Osler, MD    [facility].   Office: [phone]            Recent Results (from the past 24 hour(s))   CBC WITH AUTOMATED DIFF    Collection Time: 02/04/17  1:54 PM   Result Value Ref Range    WBC 8.7 3.6 - 11.0 K/uL    RBC 4.06 3.80 - 5.20 M/uL    HGB 12.3 11.5 - 16.0 g/dL    HCT 36.1 35.0 - 47.0 %    MCV 88.9 80.0 - 99.0 FL    MCH 30.3 26.0 - 34.0 PG    MCHC 34.1 30.0 - 36.5 g/dL    RDW 13.2 11.5 - 14.5 %    PLATELET 159 701 - 658 K/uL    NEUTROPHILS 50 32 - 75 %    LYMPHOCYTES 33 12 - 49 %    MONOCYTES 10 5 - 13 %    EOSINOPHILS 6 0 - 7 %    BASOPHILS 1 0 - 1 %    ABS. NEUTROPHILS 4.3 1.8 - 8.0 K/UL    ABS. LYMPHOCYTES 2.9 0.8 - 3.5 K/UL    ABS. MONOCYTES 0.9 0.0 - 1.0 K/UL    ABS. EOSINOPHILS 0.5 (H) 0.0 - 0.4 K/UL    ABS.  BASOPHILS 0.1 0.0 - 0.1 K/UL    DF SMEAR SCANNED      RBC COMMENTS NORMOCYTIC, NORMOCHROMIC      WBC COMMENTS REACTIVE LYMPHS     METABOLIC PANEL, COMPREHENSIVE    Collection Time: 02/04/17  1:54 PM   Result Value Ref Range    Sodium 134 (L) 136 - 145 mmol/L    Potassium 2.9 (L) 3.5 - 5.1 mmol/L    Chloride 90 (L) 97 - 108 mmol/L    CO2 34 (H) 21 - 32 mmol/L    Anion gap 10 5 - 15 mmol/L    Glucose 99 65 - 100 mg/dL    BUN 34 (H) 6 - 20 MG/DL    Creatinine 1.16 (H) 0.55 - 1.02 MG/DL    BUN/Creatinine ratio 29 (H) 12 - 20      GFR est AA 55 (L) >60 ml/min/1.73m2    GFR est non-AA 45 (L) >60 ml/min/1.73m2    Calcium 9.9 8.5 - 10.1 MG/DL    Bilirubin, total 0.8 0.2 - 1.0 MG/DL    ALT (SGPT) 22 12 - 78 U/L    AST (SGOT) 24 15 - 37 U/L    Alk. phosphatase 105 45 - 117 U/L    Protein, total 8.4 (H) 6.4 - 8.2 g/dL    Albumin 4.3 3.5 - 5.0 g/dL    Globulin 4.1 (H) 2.0 - 4.0 g/dL    A-G Ratio 1.0 (L) 1.1 - 2.2     URINALYSIS W/ REFLEX CULTURE    Collection Time: 02/04/17  2:20 PM   Result Value Ref Range    Color YELLOW/STRAW      Appearance CLEAR CLEAR      Specific gravity 1.007 1.003 - 1.030      pH (UA) 6.5 5.0 - 8.0      Protein NEGATIVE  NEG mg/dL    Glucose NEGATIVE  NEG mg/dL    Ketone NEGATIVE  NEG mg/dL    Bilirubin NEGATIVE  NEG      Blood TRACE (A) NEG      Urobilinogen 0.2 0.2 - 1.0 EU/dL    Nitrites NEGATIVE  NEG      Leukocyte Esterase SMALL (A) NEG      WBC 20-50 0 - 4 /hpf    RBC 0-5 0 - 5 /hpf    Epithelial cells FEW FEW /lpf    Bacteria 2+ (A) NEG /hpf    UA:UC IF INDICATED URINE CULTURE ORDERED (A) CNI      Hyaline cast 0-2 0 - 5 /lpf       No results found.

## 2017-02-04 NOTE — TELEPHONE ENCOUNTER
On call: pt was instructed to go to ER for UTI by Dr Roger West yesterday due to urine culture showing enterobacter and unable to treat with oral medications. She calls from hospital admitting requesting orders. She is instructed to go to ER for evaluation/admit for UTI.

## 2017-02-04 NOTE — ED NOTES
Physical Exam   Constitutional: She appears well-developed and well-nourished. HENT:   Head: Normocephalic and atraumatic. Mouth/Throat: Oropharynx is clear and moist.   Eyes: EOM are normal. Pupils are equal, round, and reactive to light. Neck: Normal range of motion. Neck supple. Cardiovascular: Normal rate, regular rhythm, normal heart sounds and intact distal pulses. Exam reveals no gallop and no friction rub. No murmur heard. Pulmonary/Chest: Effort normal. No respiratory distress. She has no wheezes. She has no rales. Abdominal: Soft. There is no tenderness. There is no rebound. Musculoskeletal: Normal range of motion. She exhibits no tenderness. Neurological: She is alert. No cranial nerve deficit. Motor; symmetric   Skin: No erythema. Psychiatric: She has a normal mood and affect. Her behavior is normal.   Nursing note and vitals reviewed.    Note written by Karine Cooney, as dictated by Yolanda Barragan MD 2:23 PM

## 2017-02-06 ENCOUNTER — OFFICE VISIT (OUTPATIENT)
Dept: INTERNAL MEDICINE CLINIC | Age: 82
End: 2017-02-06

## 2017-02-06 ENCOUNTER — TELEPHONE (OUTPATIENT)
Dept: INTERNAL MEDICINE CLINIC | Age: 82
End: 2017-02-06

## 2017-02-06 VITALS
TEMPERATURE: 98 F | SYSTOLIC BLOOD PRESSURE: 132 MMHG | DIASTOLIC BLOOD PRESSURE: 68 MMHG | HEIGHT: 61 IN | HEART RATE: 72 BPM | WEIGHT: 185 LBS | BODY MASS INDEX: 34.93 KG/M2 | RESPIRATION RATE: 16 BRPM | OXYGEN SATURATION: 99 %

## 2017-02-06 DIAGNOSIS — N30.00 ACUTE CYSTITIS WITHOUT HEMATURIA: Primary | ICD-10-CM

## 2017-02-06 NOTE — TELEPHONE ENCOUNTER
Spoke with pt who stated she went to ED for uti and she saw Ed physician who decided not to give pt abx IV because he stated he felt she had had been on enough abx. Physician did decide to give Pt potassium because pt potassium level was low. Pt is scheduled for appt today with Dr Sarah Salazar to discuss her uti.

## 2017-02-06 NOTE — CALL BACK NOTE
[facility] Emergency Department Follow Up Call Record    Discharged to : Home/Family Home/Home Health/Skilled Facility/Rehab/Assisted Living/Other___home____  1) Did you receive your discharge instructions? YES        2) Do you understand them? YES         3) Are you able to follow them? YES Appointment for February 13, 2017 with PCP. If NO, what can I clarify for you? 4) Do you understand your diagnosis? YES         5) Do you know which symptoms should prompt you to call the doctor? YES     6) Were you able to fill and  any medications that were prescribed? YES     7) You were prescribed __potassium_________for _low k+ level 2.9___________________. Common side effects of this medication are__rash, headache__________________. This is not a complete list so please review the forms given from the pharmacy for a complete list.      8) Are there any questions about your medications? NO            Have you scheduled any recommended doctors appointments (specialty, PCP) YES  If NO, what barriers are you encountering (transportation/lost contact info/cost/  didnt think necessary/no PCP  9) If discharged with Home Health, has the agency contacted you to schedule visit? NO  10) Is there anyone available to help you at home (meals, errands, transportation    monitoring) (adult children, neighbors, private duty companions) YES    11) Are you on a special diet? NO         If YES, do you understand the requirements for this diet? Education provided? 12) If presented with cough, bronchitis, COPD, asthma, is it ok to ask that the   respiratory disease management educator call you? NOT APPLICABLE      13)  A) If presented with fall, were you issued an assistive device in the ED    Are you using? NOT APPLICABLE  B) If given RX for device, have you obtained? NOT APPLICABLE       If NO, barriers? C) Therapist recommended: NO   Are you able to implement the suggestions?  NOT APPLICABLE        If NO, barriers to implementation? D) Are you having any difficulties with mobility inside your home?     (steps, bed, tub)NO   If YES, ask if the [facility] PT can contact patient and good time and number?  14)  At the end of your discharge instructions, there is information about accessing [facility], have you had a chance to review those? NO         Do you have any questions about signing up for this service? We encourage our patients to be active participants in their healthcare and this site is one of the ways to do that. It will allow you to access parts of your medical record, email your doctors office, schedule appointments, and request medications refills . 15) Are there any other questions that I can answer for you regarding    your Emergency department visit? YES         Avinash Saldaña Fuzzy reports that she did not like staying in the ED department, \"Cold\" on the stretcher for 3 plus hours. She felt like she soul have received the \"shots\" she needed for urinary tract infection. Avinash Saldaña reports still feeling poorly today. Encouraged that she call her PCP office and try to get appointment sooner than Feb 13. She does report taking the potassium as ordered.               Estimated Call Time:_____11:06 AM  ______________ Date/Time:_______________

## 2017-02-06 NOTE — PROGRESS NOTES
Mario Hernandez is a 80 y.o. female who was seen in clinic today (2/6/2017). Assessment & Plan:  Elmira Habermann was seen today for hospital follow up. Diagnoses and all orders for this visit:    Acute cystitis without hematuria- unchanged, I spent 15 minutes with the patient and >50% of the time was spent reviewing w/ her & her  our options for treatment. She is still feeling \"bad\" and so far the only abnormality has been her abnormal UC. Again reviewed our only options are IV abx at this time. She refuses to go back through the ER due to the ER physician not reading her chart. I agreed. Case d/w [facility] hospitalist but there is issues w/ available beds. She will be sent home and will try to get directly admitted to [facility] or [facility] tomorrow. Will hold off on any meds. Follow-up Disposition: Not on File         ----------------------------------------------------------------------    Subjective:  Hospital Follow Up  Mario Hernandez is seen for follow up from recent ED visit to [facility] on 2/4/17. She is accompanied by her . We reviewed the the records. She was told to go to the ER to be admitted for IV abx due to UTI that has not responded to outpatient abx (due to side effects) and having limited options due to allergies. She has had 3 urine cultures, all growing the same bacteria (ENTEROBACTER AEROGENES). ED physician deemed this not to be the cause of her symptoms and discharged her. She still reports fatigue and on/off lightheaded. She reports urinary frequency & urgency. No dysuria or hematuria. She reports she is still just feeling \"bad in general\"        Prior to Admission medications    Medication Sig Start Date End Date Taking? Authorizing Provider   potassium chloride (K-DUR, KLOR-CON) 20 mEq tablet Take 1 Tab by mouth two (2) times a day for 15 days.  2/4/17 2/19/17 Yes Consuella Pavy, MD   bumetanide (BUMEX) 1 mg tablet Take 1 tablet by mouth two  times daily 12/30/16  Yes Tate Cox MD   valsartan-hydroCHLOROthiazide (DIOVAN-HCT) 160-12.5 mg per tablet Take 1 tablet by mouth  daily 12/30/16  Yes Tate Cox MD   fluticasone Patrick Marrow) 50 mcg/actuation nasal spray Use 2 sprays in each  nostril every day 12/30/16  Yes Tate Cox MD   famotidine (PEPCID) 40 mg tablet Take 1 tablet by mouth  daily 12/30/16  Yes Tate Cox MD   busPIRone (BUSPAR) 5 mg tablet Take 1 tablet by mouth two  times daily 12/30/16  Yes Tate Cox MD   tiZANidine (ZANAFLEX) 2 mg tablet Take 1 tablet by mouth  nightly as needed 12/30/16  Yes Tate Cox MD   clobetasol (TEMOVATE) 0.05 % topical cream Apply  to affected area daily as needed for Skin Irritation or Itching. 4/21/16  Yes Tate Cox MD   nitroglycerin (NITROSTAT) 0.4 mg SL tablet 1 Tab by SubLINGual route every five (5) minutes as needed for Chest Pain. 5/18/15  Yes Tate Cox MD   CINNAMON BARK (CINNAMON PO) Take 1,000 mg by mouth two (2) times a day. Yes Historical Provider   cranberry 500 mg capsule Take 1,000 mg by mouth as needed. Yes Historical Provider   fexofenadine (ALLEGRA) 180 mg tablet Take 90 mg by mouth daily. 1/2 tablet BID   Yes Historical Provider   Comp. Stocking,Knee,Regular,Lrg Misc 2 Each by Does Not Apply route daily. 6/10/13  Yes Tate Cox MD   aspirin 81 mg tablet Take 81 mg by mouth daily. 9/30/11  Yes Historical Provider          Allergies   Allergen Reactions    Bactrim [Sulfamethoprim Ds] Rash    Ciprocinonide Other (comments)     Pt states that she is not allergic to this medication.   Did not tolerate Cipro (2017) - fatigue & aches    Macrobid [Nitrofurantoin Monohyd/M-Cryst] Unknown (comments)     Flu like      Naprosyn [Naproxen] Hives    Nsaids (Non-Steroidal Anti-Inflammatory Drug) Hives    Prozac [Fluoxetine] Hives    Statins-Hmg-Coa Reductase Inhibitors Myalgia     Did not tolerate simvastatin or atorvastatin    Sulfa (Sulfonamide Antibiotics) Hives    Vioxx [Rofecoxib] Hives     GI Upset    Zoloft [Sertraline] Hives           ROS: per HPI      Objective:   Physical Exam: deferred       Visit Vitals    /68    Pulse 72    Temp 98 °F (36.7 °C) (Oral)    Resp 16    Ht 5' 1\" (1.549 m)    Wt 185 lb (83.9 kg)    SpO2 99%    BMI 34.96 kg/m2         Disclaimer:  Advised her to call back or return to office if symptoms worsen/change/persist.  Discussed expected course/resolution/complications of diagnosis in detail with patient. Medication risks/benefits/costs/interactions/alternatives discussed with patient. She was given an after visit summary which includes diagnoses, current medications, & vitals. She expressed understanding with the diagnosis and plan.         Aria Morales MD

## 2017-02-06 NOTE — TELEPHONE ENCOUNTER
Pt called back and stated she will not come in today to see Dr Cristhian Whitfield her  is not well and due to traffic late in afternoon. Pt states she will go ahead and take the potassium and will follow up with Dr Cristhian Whitfield on 2/20/17.

## 2017-02-07 ENCOUNTER — TELEPHONE (OUTPATIENT)
Dept: INTERNAL MEDICINE CLINIC | Age: 82
End: 2017-02-07

## 2017-02-07 ENCOUNTER — HOSPITAL ENCOUNTER (OUTPATIENT)
Dept: INFUSION THERAPY | Age: 82
Discharge: HOME | End: 2017-02-07
Payer: MEDICARE

## 2017-02-07 VITALS
TEMPERATURE: 97.1 F | HEART RATE: 62 BPM | SYSTOLIC BLOOD PRESSURE: 133 MMHG | RESPIRATION RATE: 18 BRPM | DIASTOLIC BLOOD PRESSURE: 60 MMHG

## 2017-02-07 DIAGNOSIS — N39.0 URINARY TRACT INFECTION, SITE UNSPECIFIED: Primary | ICD-10-CM

## 2017-02-07 LAB
BACTERIA SPEC CULT: NORMAL
CC UR VC: NORMAL
SERVICE CMNT-IMP: NORMAL

## 2017-02-07 PROCEDURE — 74011250636 HC RX REV CODE- 250/636: Performed by: INTERNAL MEDICINE

## 2017-02-07 PROCEDURE — 96365 THER/PROPH/DIAG IV INF INIT: CPT

## 2017-02-07 PROCEDURE — 74011000258 HC RX REV CODE- 258: Performed by: INTERNAL MEDICINE

## 2017-02-07 RX ADMIN — CEFTRIAXONE SODIUM 1 G: 1 INJECTION, POWDER, FOR SOLUTION INTRAMUSCULAR; INTRAVENOUS at 15:36

## 2017-02-07 NOTE — PROGRESS NOTES
1515 Pt arrived at [facility] ambulatory and in no distress for Rocephin. Assessment completed, no new complaints voiced. Rocephin discussed. Visit Vitals    /60    Pulse 62    Temp 97.1 °F (36.2 °C)    Resp 18       Medications received:  Rocephin 1 gm IV    1640 Pt monitored 30 minutes post primary infusion. Tolerated treatment well, no adverse reaction noted. Discharge instructions given. IV d/c'd. D/Cd from [facility] ambulatory and in no distress accompanied by spouse.   Next appt 2/8

## 2017-02-08 ENCOUNTER — HOSPITAL ENCOUNTER (OUTPATIENT)
Dept: INFUSION THERAPY | Age: 82
Discharge: HOME | End: 2017-02-08
Payer: MEDICARE

## 2017-02-08 VITALS
TEMPERATURE: 98 F | HEART RATE: 57 BPM | SYSTOLIC BLOOD PRESSURE: 113 MMHG | RESPIRATION RATE: 18 BRPM | DIASTOLIC BLOOD PRESSURE: 66 MMHG

## 2017-02-08 PROCEDURE — 96365 THER/PROPH/DIAG IV INF INIT: CPT

## 2017-02-08 PROCEDURE — 74011250636 HC RX REV CODE- 250/636: Performed by: INTERNAL MEDICINE

## 2017-02-08 PROCEDURE — 74011000258 HC RX REV CODE- 258: Performed by: INTERNAL MEDICINE

## 2017-02-08 RX ADMIN — CEFTRIAXONE SODIUM 1 G: 1 INJECTION, POWDER, FOR SOLUTION INTRAMUSCULAR; INTRAVENOUS at 13:09

## 2017-02-08 NOTE — PROGRESS NOTES
[facility] short consult note:    4653 Pt arrived to [facility] ambulatory and in no distress for Rocephin. Denies any new complaints. IV established in left AC. Visit Vitals    /66    Pulse (!) 57    Temp 98 °F (36.7 °C)    Resp 18     Medication given:  Rocephin 1 gm IV    1340  Discharged home ambulatory and in no distress. Tolerated procedure well.  Next appointment 2/9

## 2017-02-09 ENCOUNTER — HOSPITAL ENCOUNTER (OUTPATIENT)
Dept: INFUSION THERAPY | Age: 82
Discharge: HOME | End: 2017-02-09
Payer: MEDICARE

## 2017-02-09 VITALS
SYSTOLIC BLOOD PRESSURE: 130 MMHG | DIASTOLIC BLOOD PRESSURE: 71 MMHG | RESPIRATION RATE: 18 BRPM | TEMPERATURE: 98.5 F | HEART RATE: 61 BPM

## 2017-02-09 PROCEDURE — 74011250636 HC RX REV CODE- 250/636: Performed by: INTERNAL MEDICINE

## 2017-02-09 PROCEDURE — 96365 THER/PROPH/DIAG IV INF INIT: CPT

## 2017-02-09 PROCEDURE — 74011000258 HC RX REV CODE- 258: Performed by: INTERNAL MEDICINE

## 2017-02-09 RX ADMIN — CEFTRIAXONE SODIUM 1 G: 1 INJECTION, POWDER, FOR SOLUTION INTRAMUSCULAR; INTRAVENOUS at 10:50

## 2017-02-09 NOTE — PROGRESS NOTES
1045 Pt arrived to [facility] ambulatory. Pt denies any distress or discomfort at this time. PIV started in left wrist. Blood return noted. Flushed without difficulty. Visit Vitals    /71 (BP 1 Location: Left arm, BP Patient Position: At rest)    Pulse 61    Temp 98.5 °F (36.9 °C)    Resp 18         1050 Rocephin 1 gm given     1125 PIV flushed with normal saline and secured. Pt denies any distress or discomfort at this time.  Pt discharged home ambulatory with next apt

## 2017-02-10 ENCOUNTER — TELEPHONE (OUTPATIENT)
Dept: SLEEP MEDICINE | Age: 82
End: 2017-02-10

## 2017-02-10 ENCOUNTER — HOSPITAL ENCOUNTER (OUTPATIENT)
Dept: INFUSION THERAPY | Age: 82
Discharge: HOME | End: 2017-02-10
Payer: MEDICARE

## 2017-02-10 VITALS
DIASTOLIC BLOOD PRESSURE: 69 MMHG | OXYGEN SATURATION: 98 % | SYSTOLIC BLOOD PRESSURE: 129 MMHG | TEMPERATURE: 97.8 F | RESPIRATION RATE: 18 BRPM | HEART RATE: 59 BPM

## 2017-02-10 DIAGNOSIS — G47.33 OSA (OBSTRUCTIVE SLEEP APNEA): Primary | ICD-10-CM

## 2017-02-10 PROCEDURE — 74011000258 HC RX REV CODE- 258: Performed by: INTERNAL MEDICINE

## 2017-02-10 PROCEDURE — 96365 THER/PROPH/DIAG IV INF INIT: CPT

## 2017-02-10 PROCEDURE — 74011250636 HC RX REV CODE- 250/636: Performed by: INTERNAL MEDICINE

## 2017-02-10 RX ORDER — SODIUM CHLORIDE 9 MG/ML
25 INJECTION, SOLUTION INTRAVENOUS AS NEEDED
Status: DISPENSED | OUTPATIENT
Start: 2017-02-11 | End: 2017-02-11

## 2017-02-10 RX ORDER — SODIUM CHLORIDE 0.9 % (FLUSH) 0.9 %
5-10 SYRINGE (ML) INJECTION AS NEEDED
Status: CANCELLED | OUTPATIENT
Start: 2017-02-11

## 2017-02-10 RX ORDER — SODIUM CHLORIDE 0.9 % (FLUSH) 0.9 %
5-10 SYRINGE (ML) INJECTION AS NEEDED
Status: DISCONTINUED | OUTPATIENT
Start: 2017-02-11 | End: 2017-02-14 | Stop reason: HOSPADM

## 2017-02-10 RX ORDER — SODIUM CHLORIDE 0.9 % (FLUSH) 0.9 %
10-40 SYRINGE (ML) INJECTION AS NEEDED
Status: ACTIVE | OUTPATIENT
Start: 2017-02-10 | End: 2017-02-11

## 2017-02-10 RX ORDER — SODIUM CHLORIDE 9 MG/ML
25 INJECTION, SOLUTION INTRAVENOUS AS NEEDED
Status: CANCELLED | OUTPATIENT
Start: 2017-02-11 | End: 2017-02-11

## 2017-02-10 RX ADMIN — CEFTRIAXONE SODIUM 1 G: 1 INJECTION, POWDER, FOR SOLUTION INTRAMUSCULAR; INTRAVENOUS at 11:28

## 2017-02-10 RX ADMIN — Medication 10 ML: at 11:20

## 2017-02-11 ENCOUNTER — HOSPITAL ENCOUNTER (OUTPATIENT)
Dept: INFUSION THERAPY | Age: 82
Discharge: HOME | End: 2017-02-11
Payer: MEDICARE

## 2017-02-11 VITALS
TEMPERATURE: 98.3 F | HEART RATE: 65 BPM | DIASTOLIC BLOOD PRESSURE: 75 MMHG | RESPIRATION RATE: 18 BRPM | SYSTOLIC BLOOD PRESSURE: 144 MMHG

## 2017-02-11 PROCEDURE — 74011250636 HC RX REV CODE- 250/636: Performed by: INTERNAL MEDICINE

## 2017-02-11 PROCEDURE — 74011000258 HC RX REV CODE- 258: Performed by: INTERNAL MEDICINE

## 2017-02-11 PROCEDURE — 96365 THER/PROPH/DIAG IV INF INIT: CPT

## 2017-02-11 RX ORDER — SODIUM CHLORIDE 9 MG/ML
25 INJECTION, SOLUTION INTRAVENOUS AS NEEDED
Status: DISPENSED | OUTPATIENT
Start: 2017-02-11 | End: 2017-02-12

## 2017-02-11 RX ORDER — SODIUM CHLORIDE 0.9 % (FLUSH) 0.9 %
5-10 SYRINGE (ML) INJECTION AS NEEDED
Status: DISCONTINUED | OUTPATIENT
Start: 2017-02-11 | End: 2017-02-15 | Stop reason: HOSPADM

## 2017-02-11 RX ADMIN — Medication 10 ML: at 11:50

## 2017-02-11 RX ADMIN — Medication 10 ML: at 11:20

## 2017-02-11 RX ADMIN — SODIUM CHLORIDE 1 G: 900 INJECTION, SOLUTION INTRAVENOUS at 11:25

## 2017-02-13 ENCOUNTER — OFFICE VISIT (OUTPATIENT)
Dept: INTERNAL MEDICINE CLINIC | Age: 82
End: 2017-02-13

## 2017-02-13 ENCOUNTER — DOCUMENTATION ONLY (OUTPATIENT)
Dept: SLEEP MEDICINE | Age: 82
End: 2017-02-13

## 2017-02-13 ENCOUNTER — HOSPITAL ENCOUNTER (OUTPATIENT)
Dept: LAB | Age: 82
Discharge: HOME | End: 2017-02-13
Payer: MEDICARE

## 2017-02-13 VITALS
DIASTOLIC BLOOD PRESSURE: 64 MMHG | WEIGHT: 185 LBS | SYSTOLIC BLOOD PRESSURE: 126 MMHG | HEIGHT: 61 IN | RESPIRATION RATE: 18 BRPM | BODY MASS INDEX: 34.93 KG/M2 | TEMPERATURE: 97.8 F | HEART RATE: 70 BPM | OXYGEN SATURATION: 99 %

## 2017-02-13 DIAGNOSIS — R53.83 FATIGUE, UNSPECIFIED TYPE: ICD-10-CM

## 2017-02-13 DIAGNOSIS — N30.00 ACUTE CYSTITIS WITHOUT HEMATURIA: Primary | ICD-10-CM

## 2017-02-13 DIAGNOSIS — G47.33 OBSTRUCTIVE SLEEP APNEA (ADULT) (PEDIATRIC): ICD-10-CM

## 2017-02-13 PROCEDURE — 80048 BASIC METABOLIC PNL TOTAL CA: CPT

## 2017-02-13 PROCEDURE — 36415 COLL VENOUS BLD VENIPUNCTURE: CPT

## 2017-02-13 NOTE — PROGRESS NOTES
Nkechi Moya is a 80 y.o. female who was seen in clinic today (2/13/2017). Assessment & Plan:  Katerina Olivas was seen today for bladder infection. Diagnoses and all orders for this visit:    Acute cystitis without hematuria- hopefully resolved, symptoms improved, will repeat UC. Fatigue, unspecified type- improved, reviewed again likely multi-factorial.  Partly was UTI related, but also likely related to CPAP. Cont to f/u with specialist.  Reviewed pros/cons to equipment. Obstructive sleep apnea (adult) (pediatric)    BMI 34.0-34.9,adult         Follow-up Disposition: Not on File       ----------------------------------------------------------------------    Subjective:  Patient RTC to f/u on fatigue. Since last visit she has completed 5 days of IV abx for her UTI through the infusion center. She reports she is feeling better. Urinary issues are back to normal (still having frequency, but this is her baseline). She also wants to talk about MARY JANE and changing sleep machines (getting a portable one). She reports mask is leaking on/off. Prior to Admission medications    Medication Sig Start Date End Date Taking?  Authorizing Provider   bumetanide (BUMEX) 1 mg tablet Take 1 tablet by mouth two  times daily 12/30/16  Yes James Raza MD   valsartan-hydroCHLOROthiazide (DIOVAN-HCT) 160-12.5 mg per tablet Take 1 tablet by mouth  daily 12/30/16  Yes James Raza MD   fluticasone [facility]) 50 mcg/actuation nasal spray Use 2 sprays in each  nostril every day 12/30/16  Yes James Raza MD   famotidine (PEPCID) 40 mg tablet Take 1 tablet by mouth  daily 12/30/16  Yes James Raza MD   busPIRone (BUSPAR) 5 mg tablet Take 1 tablet by mouth two  times daily 12/30/16  Yes James Raza MD   tiZANidine (ZANAFLEX) 2 mg tablet Take 1 tablet by mouth  nightly as needed 12/30/16  Yes James Raza MD   clobetasol (TEMOVATE) 0.05 % topical cream Apply  to affected area daily as needed for Skin Irritation or Itching. 4/21/16  Yes Cosmo Felix MD   nitroglycerin (NITROSTAT) 0.4 mg SL tablet 1 Tab by SubLINGual route every five (5) minutes as needed for Chest Pain. 5/18/15  Yes Cosmo Felix MD   CINNAMON BARK (CINNAMON PO) Take 1,000 mg by mouth two (2) times a day. Yes Historical Provider   cranberry 500 mg capsule Take 1,000 mg by mouth as needed. Yes Historical Provider   fexofenadine (ALLEGRA) 180 mg tablet Take 90 mg by mouth daily. 1/2 tablet BID   Yes Historical Provider   Comp. Stocking,Knee,Regular,Lrg Misc 2 Each by Does Not Apply route daily. 6/10/13  Yes Cosmo Felix MD   aspirin 81 mg tablet Take 81 mg by mouth daily. 9/30/11  Yes Historical Provider   potassium chloride (K-DUR, KLOR-CON) 20 mEq tablet Take 1 Tab by mouth two (2) times a day for 15 days. 2/4/17 2/19/17  Ely Lebron MD          Allergies   Allergen Reactions    Bactrim [Sulfamethoprim Ds] Rash    Ciprocinonide Other (comments)     Pt states that she is not allergic to this medication. Did not tolerate Cipro (2017) - fatigue & aches    Macrobid [Nitrofurantoin Monohyd/M-Cryst] Unknown (comments)     Flu like      Naprosyn [Naproxen] Hives    Nsaids (Non-Steroidal Anti-Inflammatory Drug) Hives    Prozac [Fluoxetine] Hives    Statins-Hmg-Coa Reductase Inhibitors Myalgia     Did not tolerate simvastatin or atorvastatin    Sulfa (Sulfonamide Antibiotics) Hives    Vioxx [Rofecoxib] Hives     GI Upset    Zoloft [Sertraline] Hives           Review of Systems   Constitutional: Negative for malaise/fatigue. Respiratory: Negative for cough and shortness of breath. Cardiovascular: Negative for chest pain and palpitations. Gastrointestinal: Negative for abdominal pain, constipation, diarrhea, nausea and vomiting. Genitourinary: Positive for frequency. Negative for dysuria, flank pain, hematuria and urgency.          Objective:   Physical Exam      Visit Vitals    /64    Pulse 70    Temp 97.8 °F (36.6 °C) (Oral)    Resp 18    Ht 5' 1\" (1.549 m)    Wt 185 lb (83.9 kg)    SpO2 99%    BMI 34.96 kg/m2         Disclaimer:  Advised her to call back or return to office if symptoms worsen/change/persist.  Discussed expected course/resolution/complications of diagnosis in detail with patient. Medication risks/benefits/costs/interactions/alternatives discussed with patient. She was given an after visit summary which includes diagnoses, current medications, & vitals. She expressed understanding with the diagnosis and plan.         Reina Orta MD

## 2017-02-14 LAB
BUN SERPL-MCNC: 20 MG/DL (ref 8–27)
BUN/CREAT SERPL: 19 (ref 11–26)
CALCIUM SERPL-MCNC: 9.9 MG/DL (ref 8.7–10.3)
CHLORIDE SERPL-SCNC: 92 MMOL/L (ref 96–106)
CO2 SERPL-SCNC: 25 MMOL/L (ref 18–29)
CREAT SERPL-MCNC: 1.08 MG/DL (ref 0.57–1)
GLUCOSE SERPL-MCNC: 87 MG/DL (ref 65–99)
POTASSIUM SERPL-SCNC: 4.1 MMOL/L (ref 3.5–5.2)
SODIUM SERPL-SCNC: 135 MMOL/L (ref 134–144)

## 2017-02-17 ENCOUNTER — TELEPHONE (OUTPATIENT)
Dept: INTERNAL MEDICINE CLINIC | Age: 82
End: 2017-02-17

## 2017-02-17 DIAGNOSIS — K21.9 GASTROESOPHAGEAL REFLUX DISEASE WITHOUT ESOPHAGITIS: ICD-10-CM

## 2017-02-17 RX ORDER — BUMETANIDE 1 MG/1
TABLET ORAL
Qty: 180 TAB | Refills: 1 | Status: SHIPPED | OUTPATIENT
Start: 2017-02-17 | End: 2017-06-28 | Stop reason: ALTCHOICE

## 2017-02-17 RX ORDER — VALSARTAN AND HYDROCHLOROTHIAZIDE 160; 12.5 MG/1; MG/1
TABLET, FILM COATED ORAL
Qty: 90 TAB | Refills: 1 | Status: SHIPPED | OUTPATIENT
Start: 2017-02-17 | End: 2017-07-10 | Stop reason: SDUPTHER

## 2017-02-17 RX ORDER — FAMOTIDINE 40 MG/1
TABLET, FILM COATED ORAL
Qty: 90 TAB | Refills: 1 | Status: SHIPPED | OUTPATIENT
Start: 2017-02-17 | End: 2017-07-10 | Stop reason: SDUPTHER

## 2017-02-17 NOTE — TELEPHONE ENCOUNTER
Call to [employer], spoke with [address] [place] [address]. Urine not obtained. Was attached to first page and missed. Dr. Aylin Childress notified.

## 2017-02-17 NOTE — PROGRESS NOTES
Please call patient. Labs are at baseline. SANDRA never collected or sent a urine sample. I would like her to return to a [facility] [address] next week (early next week, no specific day) to repeat her urine study to make sure the UTI has resolved. We can reprint order in the system.

## 2017-02-20 NOTE — PROGRESS NOTES
Call to patient. Advised needs urine retested early this week, (not next week-letter not sent). Order for urine testing faxed to [facility] on [id number] [address], [phone], per patient request, confirmation received. Patient wanted to let Dr. Keila Morgan know that she has received new harness for CPAP and that she is sleeping much better.

## 2017-02-21 ENCOUNTER — HOSPITAL ENCOUNTER (OUTPATIENT)
Dept: LAB | Age: 82
Discharge: HOME | End: 2017-02-21
Payer: MEDICARE

## 2017-02-21 PROCEDURE — 81001 URINALYSIS AUTO W/SCOPE: CPT

## 2017-02-21 PROCEDURE — 87086 URINE CULTURE/COLONY COUNT: CPT

## 2017-02-23 LAB
APPEARANCE UR: CLEAR
BACTERIA #/AREA URNS HPF: ABNORMAL /[HPF]
BACTERIA UR CULT: NORMAL
BILIRUB UR QL STRIP: NEGATIVE
CASTS URNS QL MICRO: ABNORMAL /LPF
COLOR UR: YELLOW
EPI CELLS #/AREA URNS HPF: ABNORMAL /HPF
GLUCOSE UR QL: NEGATIVE
HGB UR QL STRIP: ABNORMAL
KETONES UR QL STRIP: NEGATIVE
LEUKOCYTE ESTERASE UR QL STRIP: ABNORMAL
MICRO URNS: ABNORMAL
NITRITE UR QL STRIP: NEGATIVE
PH UR STRIP: 6.5 [PH] (ref 5–7.5)
PROT UR QL STRIP: NEGATIVE
RBC #/AREA URNS HPF: ABNORMAL /HPF
SP GR UR: 1.01 (ref 1–1.03)
UROBILINOGEN UR STRIP-MCNC: 0.2 MG/DL (ref 0.2–1)
WBC #/AREA URNS HPF: ABNORMAL /HPF

## 2017-02-23 NOTE — PROGRESS NOTES
Please call patient. UC negative (infection resolved) but UA still borderline abnormal (monitor for now). K is improved, cont KCl supplement.

## 2017-02-24 RX ORDER — POTASSIUM CHLORIDE 20 MEQ/1
20 TABLET, EXTENDED RELEASE ORAL 2 TIMES DAILY
Qty: 60 TAB | Refills: 1 | Status: SHIPPED | OUTPATIENT
Start: 2017-02-24 | End: 2017-04-29 | Stop reason: SDUPTHER

## 2017-02-24 NOTE — TELEPHONE ENCOUNTER
Return call to patient. Advised of Dr. Palak Waldron' note and recommendations. Patient verbalized understanding. Stated she was out of potassium.

## 2017-02-24 NOTE — TELEPHONE ENCOUNTER
Cont on med for now.   Can get labs repeated w/ cardiologist next month to determine if she needs to stay on it

## 2017-02-27 ENCOUNTER — TELEPHONE (OUTPATIENT)
Dept: GYNECOLOGY | Age: 82
End: 2017-02-27

## 2017-02-27 DIAGNOSIS — Z12.31 ENCOUNTER FOR SCREENING MAMMOGRAM FOR MALIGNANT NEOPLASM OF BREAST: Primary | ICD-10-CM

## 2017-03-09 ENCOUNTER — HOSPITAL ENCOUNTER (OUTPATIENT)
Dept: MAMMOGRAPHY | Age: 82
Discharge: HOME | End: 2017-03-09
Attending: OBSTETRICS & GYNECOLOGY
Payer: MEDICARE

## 2017-03-09 DIAGNOSIS — Z12.31 ENCOUNTER FOR SCREENING MAMMOGRAM FOR MALIGNANT NEOPLASM OF BREAST: ICD-10-CM

## 2017-03-09 PROCEDURE — 77067 SCR MAMMO BI INCL CAD: CPT

## 2017-03-20 ENCOUNTER — OFFICE VISIT (OUTPATIENT)
Dept: GYNECOLOGY | Age: 82
End: 2017-03-20

## 2017-03-20 ENCOUNTER — HOSPITAL ENCOUNTER (OUTPATIENT)
Dept: LAB | Age: 82
Discharge: HOME | End: 2017-03-20
Payer: MEDICARE

## 2017-03-20 VITALS
WEIGHT: 184.2 LBS | HEART RATE: 69 BPM | DIASTOLIC BLOOD PRESSURE: 71 MMHG | HEIGHT: 61 IN | BODY MASS INDEX: 34.78 KG/M2 | SYSTOLIC BLOOD PRESSURE: 148 MMHG

## 2017-03-20 DIAGNOSIS — Z91.89 GYN EXAM FOR HIGH-RISK MEDICARE PATIENT: Primary | ICD-10-CM

## 2017-03-20 DIAGNOSIS — L90.0 LICHEN SCLEROSUS: ICD-10-CM

## 2017-03-20 PROCEDURE — 88142 CYTOPATH C/V THIN LAYER: CPT | Performed by: OBSTETRICS & GYNECOLOGY

## 2017-03-20 NOTE — PROGRESS NOTES
[address], [address] [facility]  [phone]  MD Zaida Gilbert MD    Patient ID:  Dirk Parry  [id number]  1935/81 y.o. Visit date: 3/20/2017    INTERVAL HISTORY: Dirk Parry is a  female with a history of ABIODUN. The patient has noted a prior history of pruritis vulvitis and inframammary dermatitis. The patient presents for ongoing Evaluation at a 12 month interval.    Last Cytology: 5/15/2014    Recent UTI refractory to PO antibiotics/allergic to sulfa. Rx IV Rocephin    Imaging history: Mammogram due   Chemotherapy history: None  Ongoing cardiology followup. Active, restricted by recent ankle injury. Negative  and GI review. Negative cardiopulmonary review. Patient denies any abnormal bleeding or vaginal discharge. Weight stable. 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  HX HEART CATHETERIZATION  2/12    s/p stent    HX HEENT  3/2002    jarod. laser eye surg.     HX MOHS PROCEDURES Right 4/28/04    HX ORTHOPAEDIC      Right TKR ([place])    HX ORTHOPAEDIC      Right wrist cyst    HX OTHER SURGICAL  5/15/14    PAP- normal    HX TONSIL AND ADENOIDECTOMY         Social History     Social History    Marital status:      Spouse name: N/A    Number of children: N/A    Years of education: N/A     Occupational History    Not on file. Social History Main Topics    Smoking status: Former Smoker     Packs/day: 0.30     Years: 5.00     Quit date: 7/2/1970    Smokeless tobacco: Never Used    Alcohol use No    Drug use: No    Sexual activity: No     Other Topics Concern    Not on file     Social History Narrative       Family History   Problem Relation Age of Onset    Coronary Artery Disease Mother     Hypertension Mother     Heart Failure Mother      CHF    Diabetes Father     Heart Disease Father     Hypertension Sister     Heart Disease Sister     Stroke Sister     Heart Surgery Sister     Heart Disease Brother     Diabetes Brother     No Known Problems Daughter     No Known Problems Daughter     No Known Problems Daughter     No Known Problems Son     Breast Cancer Paternal Grandmother        Current Outpatient Prescriptions on File Prior to Visit   Medication Sig Dispense Refill    potassium chloride (K-DUR, KLOR-CON) 20 mEq tablet Take 1 Tab by mouth two (2) times a day. 60 Tab 1    bumetanide (BUMEX) 1 mg tablet Take 1 tablet by mouth two  times daily 180 Tab 1    famotidine (PEPCID) 40 mg tablet Take 1 tablet by mouth  daily 90 Tab 1    valsartan-hydroCHLOROthiazide (DIOVAN-HCT) 160-12.5 mg per tablet Take 1 tablet by mouth  daily 90 Tab 1    busPIRone (BUSPAR) 5 mg tablet Take 1 tablet by mouth two  times daily 180 Tab 0    tiZANidine (ZANAFLEX) 2 mg tablet Take 1 tablet by mouth  nightly as needed 90 Tab 0    clobetasol (TEMOVATE) 0.05 % topical cream Apply  to affected area daily as needed for Skin Irritation or Itching. 60 g 2    CINNAMON BARK (CINNAMON PO) Take 1,000 mg by mouth two (2) times a day.  cranberry 500 mg capsule Take 1,000 mg by mouth as needed.  aspirin 81 mg tablet Take 81 mg by mouth daily.       fluticasone (FLONASE) 50 mcg/actuation nasal spray Use 2 sprays in each  nostril every day 48 g 0    nitroglycerin (NITROSTAT) 0.4 mg SL tablet 1 Tab by SubLINGual route every five (5) minutes as needed for Chest Pain. 1 Bottle 0    fexofenadine (ALLEGRA) 180 mg tablet Take 90 mg by mouth daily. 1/2 tablet BID      Comp. Stocking,Knee,Regular,Lrg Misc 2 Each by Does Not Apply route daily. 2 Each 1     No current facility-administered medications on file prior to visit. Allergies   Allergen Reactions    Bactrim [Sulfamethoprim Ds] Rash    Ciprocinonide Other (comments)     Pt states that she is not allergic to this medication. Did not tolerate Cipro (2017) - fatigue & aches    Macrobid [Nitrofurantoin Monohyd/M-Cryst] Unknown (comments)     Flu like      Naprosyn [Naproxen] Hives    Nsaids (Non-Steroidal Anti-Inflammatory Drug) Hives    Prozac [Fluoxetine] Hives    Statins-Hmg-Coa Reductase Inhibitors Myalgia     Did not tolerate simvastatin or atorvastatin    Sulfa (Sulfonamide Antibiotics) Hives    Vioxx [Rofecoxib] Hives     GI Upset    Zoloft [Sertraline] Hives       ROS:  Negative than HPI  Active, no restrictions. Negative  and GI review. Negative cardiopulmonary review. Patient denies any abnormal bleeding or vaginal discharge. Weight stable.       OBJECTIVE:  PHYSICAL EXAM  VITAL SIGNS: Visit Vitals    /71 (BP 1 Location: Left arm, BP Patient Position: Sitting)    Pulse 69    Ht 5' 1\" (1.549 m)    Wt 184 lb 3.2 oz (83.6 kg)    BMI 34.8 kg/m2      GENERAL NAEL: in no apparent distress, in no respiratory distress and acyanotic, oriented times 3 and afebrile   HEENT: within normal limits   RESPIRATORY: lungs clear to auscultation, breath sounds equal and symmetric   CARDIOVASC Regular rate and rhythm or S1S2 present     GASTROINT: soft, non-tender, without masses or organomegaly   MUSCULOSKEL: no joint tenderness, deformity or swelling   INTEGUMENT: Scattered red isolated lesion under the right breast, no ulcers. Nontender. No changes   EXTREMITIES: extremities normal, atraumatic, no cyanosis or edema   PELVIC: External genitalia: No gross lesion. No active inflamatory vulvitis, BUS negative  Vaginal: atrophic mucosa, cytology taken, no suspicious masses, induration or nodularity  Adnexa: normal bimanual exam and non palpable   RECTAL: normal rectal, no masses   MALIKA SURVEY: Cervical, supraclavicular, and axillary nodes normal.   NEURO: Grossly normal     DATE REVIEW as available:  Lab Results   Component Value Date/Time    WBC 8.7 02/04/2017 01:54 PM    HGB 12.3 02/04/2017 01:54 PM    HCT 36.1 02/04/2017 01:54 PM    PLATELET 942 16/74/0645 01:54 PM    MCV 88.9 02/04/2017 01:54 PM     Lab Results   Component Value Date/Time    Sodium 135 02/13/2017 01:33 PM    Potassium 4.1 02/13/2017 01:33 PM    Chloride 92 02/13/2017 01:33 PM    CO2 25 02/13/2017 01:33 PM    Anion gap 10 02/04/2017 01:54 PM    Glucose 87 02/13/2017 01:33 PM    BUN 20 02/13/2017 01:33 PM    Creatinine 1.08 02/13/2017 01:33 PM    BUN/Creatinine ratio 19 02/13/2017 01:33 PM    GFR est AA 56 02/13/2017 01:33 PM    GFR est non-AA 49 02/13/2017 01:33 PM    Calcium 9.9 02/13/2017 01:33 PM       IMPRESSION AND PLAN:    Julius Daley has a working diagnosis of ABIODUN, JAKE   Cytology taken    Return twelve months or PRN    All questions answered.       Mimi Morfin MD  3/20/2017/11:49 AM

## 2017-03-20 NOTE — PROGRESS NOTES
One year check up, last visit 11/29/15, pt reports no abnormal spotting or bleeding, pt states she has no questions or concerns for today's visit

## 2017-03-28 NOTE — PROGRESS NOTES
Patient:   Mario Hernandez  SSN: [id number]  : 1935    Date:    3/28/2017    Ms. Zenon Rizzo's cytology/Pap smear has been interpreted as within normal limts. I would ask that subsequent Pap smears be performed at the interval discussed at the last office visit.     If there are any questions please do not hesitate to contact our offices [phone])    Sarah Lima MD

## 2017-03-30 ENCOUNTER — OFFICE VISIT (OUTPATIENT)
Dept: CARDIOLOGY CLINIC | Age: 82
End: 2017-03-30

## 2017-03-30 VITALS
HEIGHT: 61 IN | SYSTOLIC BLOOD PRESSURE: 120 MMHG | WEIGHT: 187 LBS | DIASTOLIC BLOOD PRESSURE: 64 MMHG | OXYGEN SATURATION: 99 % | BODY MASS INDEX: 35.3 KG/M2 | HEART RATE: 62 BPM | RESPIRATION RATE: 12 BRPM

## 2017-03-30 DIAGNOSIS — I25.10 CORONARY ARTERY DISEASE INVOLVING NATIVE CORONARY ARTERY OF NATIVE HEART WITHOUT ANGINA PECTORIS: Primary | ICD-10-CM

## 2017-03-30 DIAGNOSIS — E78.2 MIXED HYPERLIPIDEMIA: ICD-10-CM

## 2017-03-30 DIAGNOSIS — I10 HYPERTENSION, ESSENTIAL: ICD-10-CM

## 2017-03-30 DIAGNOSIS — Z00.00 ROUTINE CHECK-UP: ICD-10-CM

## 2017-03-30 NOTE — PROGRESS NOTES
HISTORY OF PRESENT ILLNESS  Jose Ricketts is a 80 y.o. female     SUMMARY:   Problem List  Date Reviewed: 3/30/2017          Codes Class Noted    Right rotator cuff tear ICD-10-CM: M75.101  ICD-9-CM: 840.4  11/11/2015    Overview Signed 11/11/2015 10:38 AM by Ramila Mckeon MD     Right shoulder             Pre-diabetes ICD-10-CM: R73.03  ICD-9-CM: 790.29  8/17/2015        Pain in left ankle ICD-10-CM: M25.572  ICD-9-CM: 719.47  4/29/2015    Overview Signed 4/29/2015 10:13 AM by Ramila Mckeon MD     Saw ortho (4/15) - dx w/ rupture L tibialis anterior tendon             Osteopenia ICD-10-CM: M85.80  ICD-9-CM: 733.90  7/30/2014        Obstructive sleep apnea (adult) (pediatric) ICD-10-CM: G47.33  ICD-9-CM: 327.23  8/20/2012        ABIODUN (vulval intraepithelial neoplasia) I ICD-10-CM: N90.0  ICD-9-CM: 624.01  4/30/2012        CAD (coronary artery disease), native coronary artery ICD-10-CM: I25.10  ICD-9-CM: 414.01  2/29/2012    Overview Addendum 11/11/2015  5:54 AM by Ramila Mckeon MD     Statin intolerant  Cardiac Cath 2/28/12 CORONARY CIRCULATION:  A drug-eluting stent was performed on the 90 % lesion in the mid LAD. Following intervention there was a 0 % residual stenosis. -- A  drug-eluting stent was placed across the lesion and successfully deployed  at a maximum inflation pressure of 14 suzanne. Left main: Normal. Mid LAD: There was a tubular 90 %  type B stenosis in the proximal third of the vessel segment, just before  D1. There was LISA grade 3 flow through the vessel (brisk flow). Circumflex: The vessel was medium sized. Angiography showed minor luminal  irregularities. RCA: The vessel was small to medium sized. Angiography  showed minor luminal irregularities.                  Hyperlipidemia ICD-10-CM: E78.5  ICD-9-CM: 272.4  2/23/2012    Overview Signed 8/11/2015 10:57 AM by Ramila Mckeon MD     Intolerant to statins & fenofibrate (irritability)             Hypertension, essential ICD-10-CM: I10  ICD-9-CM: 401.9  9/30/2011        Gout ICD-10-CM: M10.9  ICD-9-CM: 274.9  9/30/2011        Generalized osteoarthritis ICD-10-CM: M15.9  ICD-9-CM: 715.00  9/30/2011        Anxiety ICD-10-CM: F41.9  ICD-9-CM: 300.00  9/30/2011        GERD (gastroesophageal reflux disease) ICD-10-CM: K21.9  ICD-9-CM: 530.81  9/30/2011              Current Outpatient Prescriptions on File Prior to Visit   Medication Sig    potassium chloride (K-DUR, KLOR-CON) 20 mEq tablet Take 1 Tab by mouth two (2) times a day.  bumetanide (BUMEX) 1 mg tablet Take 1 tablet by mouth two  times daily    famotidine (PEPCID) 40 mg tablet Take 1 tablet by mouth  daily    valsartan-hydroCHLOROthiazide (DIOVAN-HCT) 160-12.5 mg per tablet Take 1 tablet by mouth  daily    busPIRone (BUSPAR) 5 mg tablet Take 1 tablet by mouth two  times daily    tiZANidine (ZANAFLEX) 2 mg tablet Take 1 tablet by mouth  nightly as needed    clobetasol (TEMOVATE) 0.05 % topical cream Apply  to affected area daily as needed for Skin Irritation or Itching.  nitroglycerin (NITROSTAT) 0.4 mg SL tablet 1 Tab by SubLINGual route every five (5) minutes as needed for Chest Pain.  CINNAMON BARK (CINNAMON PO) Take 1,000 mg by mouth two (2) times a day.  cranberry 500 mg capsule Take 1,000 mg by mouth as needed.  fexofenadine (ALLEGRA) 180 mg tablet Take 90 mg by mouth daily. 1/2 tablet BID    Comp. Stocking,Knee,Regular,Lrg Misc 2 Each by Does Not Apply route daily.  aspirin 81 mg tablet Take 81 mg by mouth daily.  fluticasone (FLONASE) 50 mcg/actuation nasal spray Use 2 sprays in each  nostril every day     No current facility-administered medications on file prior to visit. CARDIOLOGY STUDIES TO DATE:  7/11 normal stress cardiolyte lvef 75%  Chief Complaint   Patient presents with    Coronary Artery Disease     HPI :  Ms. Doyce Papa is doing pretty well.   She had a series of UTI's earlier this year and ended up having to have outpatient antibiotics for 5 days to finally get things cleared up. She got a new strap for her CPAP and that seems to be working better though she still has fatigue particularly if she does not sleep well the night before. She is exercising some but not as much as she would like. She has been intolerant of all statins. She has no worrisome cardiac symptoms. CARDIAC ROS:   negative for chest pain, dyspnea, palpitations, syncope, orthopnea, paroxysmal nocturnal dyspnea, exertional chest pressure/discomfort, claudication, lower extremity edema    Family History   Problem Relation Age of Onset    Coronary Artery Disease Mother     Hypertension Mother     Heart Failure Mother      CHF    Diabetes Father     Heart Disease Father     Hypertension Sister     Heart Disease Sister     Stroke Sister     Heart Surgery Sister     Heart Disease Brother     Diabetes Brother     No Known Problems Daughter     No Known Problems Daughter     No Known Problems Daughter     No Known Problems Son     Breast Cancer Paternal Grandmother        Past Medical History:   Diagnosis Date    Arthritis     CAD (coronary artery disease), native coronary artery 2/29/2012    CHI (closed head injury) 11/3/2013    [facility] in 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     Visit Vitals    /64 (BP 1 Location: Left arm, BP Patient Position: Sitting)    Pulse 62    Resp 12    Ht 5' 1\" (1.549 m)    Wt 187 lb (84.8 kg)    SpO2 99%    BMI 35.33 kg/m2       Wt Readings from Last 3 Encounters:   03/30/17 187 lb (84.8 kg)   03/20/17 184 lb 3.2 oz (83.6 kg)   02/13/17 185 lb (83.9 kg)            BP Readings from Last 3 Encounters:   03/30/17 120/64   03/20/17 148/71   02/13/17 126/64       PHYSICAL EXAM  General appearance: alert, cooperative, no distress, appears stated age  Neck: supple, symmetrical, trachea midline, no adenopathy, no carotid bruit and no JVD  Lungs: clear to auscultation bilaterally  Heart: regular rate and rhythm, S1, S2 normal, no murmur, click, rub or gallop  Extremities: extremities normal, atraumatic, no cyanosis or edema    Lab Results   Component Value Date/Time    Cholesterol, total 220 08/15/2015 09:50 AM    Cholesterol, total 232 03/23/2015 11:50 AM    Cholesterol, total 239 07/31/2014 10:14 AM    Cholesterol, total 177 01/10/2014 08:23 AM    Cholesterol, total 167 02/07/2013 10:03 AM    HDL Cholesterol 47 08/15/2015 09:50 AM    HDL Cholesterol 54 03/23/2015 11:50 AM    HDL Cholesterol 55 07/31/2014 10:14 AM    HDL Cholesterol 59 01/10/2014 08:23 AM    HDL Cholesterol 76 02/07/2013 10:03 AM    LDL, calculated 133 08/15/2015 09:50 AM    LDL, calculated 145 03/23/2015 11:50 AM    LDL, calculated 143 07/31/2014 10:14 AM    LDL, calculated 82 01/10/2014 08:23 AM    LDL, calculated 69 02/07/2013 10:03 AM    Triglyceride 201 08/15/2015 09:50 AM    Triglyceride 166 03/23/2015 11:50 AM    Triglyceride 206 07/31/2014 10:14 AM    Triglyceride 178 01/10/2014 08:23 AM    Triglyceride 108 02/07/2013 10:03 AM    CHOL/HDL Ratio 4.2 03/09/2010 11:53 AM     ASSESSMENT  Ms. Darshan Singh is stable and asymptomatic at this time on a good medical regimen and needs no cardiac testing at this time. She will follow up with Dr. Erasto Dee for blood work including a lipid profile. current treatment plan is effective, no change in therapy  lab results and schedule of future lab studies reviewed with patient  reviewed diet, exercise and weight control    Encounter Diagnoses   Name Primary?     Coronary artery disease involving native coronary artery of native heart without angina pectoris Yes    Mixed hyperlipidemia     Hypertension, essential     Routine check-up      Orders Placed This Encounter    AMB POC EKG ROUTINE W/ 12 LEADS, INTER & REP       Follow-up Disposition:  Return in about 1 year (around 3/30/2018).     Deborah Larkin MD  3/30/2017

## 2017-03-30 NOTE — MR AVS SNAPSHOT
Visit Information Date & Time Provider Department Dept. Phone Encounter #  
 3/30/2017  1:20 PM Tahir Lee MD [facility] [place] [phone] [id number] Follow-up Instructions Return in about 1 year (around 3/30/2018). Your Appointments 4/14/2017 12:30 PM  
ROUTINE CARE with Hope Smith MD  
[address] [address] Internal Medicine [address]) Appt Note: 6 month f/u 0 cp  
 [address] [facility] [address]  
[address] [address] [address] [place]  
  
    
 10/30/2017 11:40 AM  
Any with Naila Tripathi ([address]) Appt Note: 1 year PAP f/up- bring machine [address] [phone] [address] [facility] [address] [address] Upcoming Health Maintenance Date Due  
 GLAUCOMA SCREENING Q2Y 6/5/2017 MEDICARE YEARLY EXAM 10/27/2017 DTaP/Tdap/Td series (2 - Td) 10/20/2026 Allergies as of 3/30/2017  Review Complete On: 3/30/2017 By: Tahir Lee MD  
  
 Severity Noted Reaction Type Reactions Bactrim [Sulfamethoprim Ds]  09/30/2011    Rash Ciprocinonide  09/30/2011    Other (comments) Pt states that she is not allergic to this medication. Did not tolerate Cipro (2017) - fatigue & aches Macrobid [Nitrofurantoin Monohyd/m-cryst]  09/30/2011    Unknown (comments) Flu like Naprosyn [Naproxen]  10/17/2011    Hives Nsaids (Non-steroidal Anti-inflammatory Drug)  09/30/2011    Hives Prozac [Fluoxetine]  10/17/2011    Hives Statins-hmg-coa Reductase Inhibitors  04/29/2015    Myalgia Did not tolerate simvastatin or atorvastatin  
 Sulfa (Sulfonamide Antibiotics)  02/28/2012   Topical Hives Vioxx [Rofecoxib]  09/30/2011    Hives GI Upset Zoloft [Sertraline]  09/30/2011    Hives Current Immunizations  Reviewed on 2/13/2017 Name Date Influenza High Dose Vaccine PF 10/26/2016, 10/13/2015, 11/1/2014, 11/11/2013 Influenza Vaccine Split 10/17/2011 Pneumococcal Conjugate (PCV-13) 10/13/2015 Pneumococcal Vaccine (Unspecified Type) 9/27/2006 TD Vaccine 9/27/1996 Tdap 10/20/2016 Zoster Vaccine, Live 11/15/2014 Not reviewed this visit You Were Diagnosed With   
  
 Codes Comments Coronary artery disease involving native coronary artery of native heart without angina pectoris    -  Primary ICD-10-CM: I25.10 ICD-9-CM: 414.01 Mixed hyperlipidemia     ICD-10-CM: E78.2 ICD-9-CM: 272.2 Hypertension, essential     ICD-10-CM: I10 
ICD-9-CM: 401.9 Routine check-up     ICD-10-CM: Z00.00 ICD-9-CM: V70.0 Vitals BP Pulse Resp Height(growth percentile) Weight(growth percentile) SpO2  
 120/64 (BP 1 Location: Left arm, BP Patient Position: Sitting) 62 12 5' 1\" (1.549 m) 187 lb (84.8 kg) 99% BMI OB Status Smoking Status 35.33 kg/m2 Postmenopausal Former Smoker Vitals History BMI and BSA Data Body Mass Index Body Surface Area  
 35.33 kg/m 2 1.91 m 2 Preferred Pharmacy Pharmacy Name Phone [facility] [address], [address] [place] [address] AT [address] [place] [address] Road [phone] Your Updated Medication List  
  
   
This list is accurate as of: 3/30/17  1:57 PM.  Always use your most recent med list. ALLEGRA 180 mg tablet Generic drug:  fexofenadine Take 90 mg by mouth daily. 1/2 tablet BID  
  
 aspirin 81 mg tablet Take 81 mg by mouth daily. bumetanide 1 mg tablet Commonly known as:  Malachy Bile Take 1 tablet by mouth two  times daily  
  
 busPIRone 5 mg tablet Commonly known as:  BUSPAR Take 1 tablet by mouth two  times daily CINNAMON PO Take 1,000 mg by mouth two (2) times a day. clobetasol 0.05 % topical cream  
Commonly known as:  Caryle Killer Apply  to affected area daily as needed for Skin Irritation or Itching. Comp. Stocking,Knee,Regular,Lrg Misc  
2 Each by Does Not Apply route daily. cranberry 500 mg capsule Take 1,000 mg by mouth as needed. famotidine 40 mg tablet Commonly known as:  PEPCID Take 1 tablet by mouth  daily  
  
 fluticasone 50 mcg/actuation nasal spray Commonly known as:  Tali Finely Use 2 sprays in each  nostril every day  
  
 nitroglycerin 0.4 mg SL tablet Commonly known as:  NITROSTAT  
1 Tab by SubLINGual route every five (5) minutes as needed for Chest Pain.  
  
 potassium chloride 20 mEq tablet Commonly known as:  K-DUR, KLOR-CON Take 1 Tab by mouth two (2) times a day. tiZANidine 2 mg tablet Commonly known as:  Cheryle Vielka Take 1 tablet by mouth  nightly as needed  
  
 valsartan-hydroCHLOROthiazide 160-12.5 mg per tablet Commonly known as:  DIOVAN-HCT Take 1 tablet by mouth  daily We Performed the Following AMB POC EKG ROUTINE W/ 12 LEADS, INTER & REP [52452 CPT(R)] Follow-up Instructions Return in about 1 year (around 3/30/2018). Introducing [facility] & HEALTH SERVICES! Mora Guzmán introduces [employer] patient portal. Now you can access parts of your medical record, email your doctor's office, and request medication refills online. 1. In your internet browser, go to [email or web address] 2. Click on the First Time User? Click Here link in the Sign In box. You will see the New Member Sign Up page. 3. Enter your [employer] Access Code exactly as it appears below. You will not need to use this code after youve completed the sign-up process. If you do not sign up before the expiration date, you must request a new code. · [employer] Access Code: [email or web address] Expires: 5/2/2017  4:04 PM 
 
4. Enter the last four digits of your Social Security Number (xxxx) and Date of Birth (mm/dd/yyyy) as indicated and click Submit.  You will be taken to the next sign-up page. 5. Create a [employer] ID. This will be your [employer] login ID and cannot be changed, so think of one that is secure and easy to remember. 6. Create a [employer] password. You can change your password at any time. 7. Enter your Password Reset Question and Answer. This can be used at a later time if you forget your password. 8. Enter your e-mail address. You will receive e-mail notification when new information is available in [address] 9. Click Sign Up. You can now view and download portions of your medical record. 10. Click the Download Summary menu link to download a portable copy of your medical information. If you have questions, please visit the Frequently Asked Questions section of the [employer] website. Remember, [employer] is NOT to be used for urgent needs. For medical emergencies, dial 911. Now available from your iPhone and Android! Please provide this summary of care documentation to your next provider. Your primary care clinician is listed as Freddie Melchor. If you have any questions after today's visit, please call [phone].

## 2017-04-24 ENCOUNTER — OFFICE VISIT (OUTPATIENT)
Dept: INTERNAL MEDICINE CLINIC | Age: 82
End: 2017-04-24

## 2017-04-24 VITALS
OXYGEN SATURATION: 99 % | BODY MASS INDEX: 35.3 KG/M2 | HEART RATE: 70 BPM | TEMPERATURE: 97.8 F | WEIGHT: 187 LBS | DIASTOLIC BLOOD PRESSURE: 64 MMHG | SYSTOLIC BLOOD PRESSURE: 124 MMHG | HEIGHT: 61 IN | RESPIRATION RATE: 20 BRPM

## 2017-04-24 DIAGNOSIS — K21.9 GASTROESOPHAGEAL REFLUX DISEASE WITHOUT ESOPHAGITIS: ICD-10-CM

## 2017-04-24 DIAGNOSIS — R53.83 FATIGUE, UNSPECIFIED TYPE: ICD-10-CM

## 2017-04-24 DIAGNOSIS — I25.10 CORONARY ARTERY DISEASE INVOLVING NATIVE CORONARY ARTERY OF NATIVE HEART WITHOUT ANGINA PECTORIS: Primary | ICD-10-CM

## 2017-04-24 DIAGNOSIS — E78.2 MIXED HYPERLIPIDEMIA: ICD-10-CM

## 2017-04-24 DIAGNOSIS — R73.03 PRE-DIABETES: ICD-10-CM

## 2017-04-24 DIAGNOSIS — E66.01 SEVERE OBESITY (BMI 35.0-39.9): ICD-10-CM

## 2017-04-24 DIAGNOSIS — F41.9 ANXIETY: ICD-10-CM

## 2017-04-24 DIAGNOSIS — I10 HYPERTENSION, ESSENTIAL: ICD-10-CM

## 2017-04-24 NOTE — PATIENT INSTRUCTIONS

## 2017-04-24 NOTE — MR AVS SNAPSHOT
Visit Information Date & Time Provider Department Dept. Phone Encounter #  
 4/24/2017 12:30 PM Juancho Regan, [address] [facility] Internal Medicine [phone] [id number] Follow-up Instructions Return in about 6 months (around 10/24/2017) for FULL PHYSICAL - 30 minutes. Your Appointments 10/30/2017 11:40 AM  
Any with Urvashi Unger MD  
[address] ([address]) Appt Note: 1 year PAP f/up- bring machine 305 [facility] [address] [phone] 9407 [facility] [address]  
  
    
 10/30/2017  2:30 PM  
Medicare Physical with Juancho Regan MD  
[facility] Internal Medicine [address] [facility]) Appt Note: medicare wellness [address] [facility] [address] [place]  
[address] [facility]  
  
    
 3/29/2018  1:20 PM  
ESTABLISHED PATIENT with Marley Benavides MD  
[facility] ([address]) Appt Note: one year follow up  
 [address] [facility] [address] [place] [address]  
[address] [address] Upcoming Health Maintenance Date Due  
 GLAUCOMA SCREENING Q2Y 6/5/2017 MEDICARE YEARLY EXAM 10/27/2017 DTaP/Tdap/Td series (2 - Td) 10/20/2026 Allergies as of 4/24/2017  Review Complete On: 4/24/2017 By: Juancho Regan MD  
  
 Severity Noted Reaction Type Reactions Bactrim [Sulfamethoprim Ds]  09/30/2011    Rash Ciprocinonide  09/30/2011    Other (comments) Pt states that she is not allergic to this medication. Did not tolerate Cipro (2017) - fatigue & aches Macrobid [Nitrofurantoin Monohyd/m-cryst]  09/30/2011    Unknown (comments) Flu like Naprosyn [Naproxen]  10/17/2011    Hives Nsaids (Non-steroidal Anti-inflammatory Drug)  09/30/2011    Hives Prozac [Fluoxetine]  10/17/2011    Hives Statins-hmg-coa Reductase Inhibitors  04/29/2015    Myalgia Did not tolerate simvastatin or atorvastatin  
 Sulfa (Sulfonamide Antibiotics)  02/28/2012   Topical Hives Vioxx [Rofecoxib]  09/30/2011    Hives GI Upset Zoloft [Sertraline]  09/30/2011    Hives Current Immunizations  Reviewed on 4/24/2017 Name Date Influenza High Dose Vaccine PF 10/26/2016, 10/13/2015, 11/1/2014, 11/11/2013 Influenza Vaccine Split 10/17/2011 Pneumococcal Conjugate (PCV-13) 10/13/2015 Pneumococcal Vaccine (Unspecified Type) 9/27/2006 TD Vaccine 9/27/1996 Tdap 10/20/2016 Zoster Vaccine, Live 11/15/2014 Reviewed by Sonia Sesay RN on 4/24/2017 at 12:28 PM  
You Were Diagnosed With   
  
 Codes Comments Coronary artery disease involving native coronary artery of native heart without angina pectoris    -  Primary ICD-10-CM: I25.10 ICD-9-CM: 414.01 Hypertension, essential     ICD-10-CM: I10 
ICD-9-CM: 401.9 Mixed hyperlipidemia     ICD-10-CM: E78.2 ICD-9-CM: 272.2 Gastroesophageal reflux disease without esophagitis     ICD-10-CM: K21.9 ICD-9-CM: 530.81 Anxiety     ICD-10-CM: F41.9 ICD-9-CM: 300.00 Pre-diabetes     ICD-10-CM: R73.03 
ICD-9-CM: 790.29 Severe obesity (BMI 35.0-39.9) (HCC)     ICD-10-CM: E66.01 
ICD-9-CM: 278.01 Fatigue, unspecified type     ICD-10-CM: R53.83 ICD-9-CM: 780.79 Vitals BP Pulse Temp Resp Height(growth percentile) Weight(growth percentile) 124/64 70 97.8 °F (36.6 °C) (Oral) 20 5' 1\" (1.549 m) 187 lb (84.8 kg) SpO2 BMI OB Status Smoking Status 99% 35.33 kg/m2 Postmenopausal Former Smoker BMI and BSA Data Body Mass Index Body Surface Area  
 35.33 kg/m 2 1.91 m 2 Preferred Pharmacy Pharmacy Name Phone [facility] [address], [address] [place] AT [address] [phone] Your Updated Medication List  
  
   
This list is accurate as of: 4/24/17 12:51 PM.  Always use your most recent med list. ALLEGRA 180 mg tablet Generic drug:  fexofenadine Take 90 mg by mouth daily. 1/2 tablet BID  
  
 aspirin 81 mg tablet Take 81 mg by mouth daily. bumetanide 1 mg tablet Commonly known as:  Gissell Last Take 1 tablet by mouth two  times daily  
  
 busPIRone 5 mg tablet Commonly known as:  BUSPAR Take 1 tablet by mouth two  times daily CINNAMON PO Take 1,000 mg by mouth two (2) times a day. clobetasol 0.05 % topical cream  
Commonly known as:  Valentina Inoue Apply  to affected area daily as needed for Skin Irritation or Itching. Comp. Stocking,Knee,Regular,Lrg Misc  
2 Each by Does Not Apply route daily. cranberry 500 mg capsule Take 1,000 mg by mouth as needed. famotidine 40 mg tablet Commonly known as:  PEPCID Take 1 tablet by mouth  daily  
  
 fluticasone 50 mcg/actuation nasal spray Commonly known as:  Danny Zapata Use 2 sprays in each  nostril every day  
  
 nitroglycerin 0.4 mg SL tablet Commonly known as:  NITROSTAT  
1 Tab by SubLINGual route every five (5) minutes as needed for Chest Pain.  
  
 potassium chloride 20 mEq tablet Commonly known as:  K-DUR, KLOR-CON Take 1 Tab by mouth two (2) times a day. tiZANidine 2 mg tablet Commonly known as:  Jeannetta Bodo Take 1 tablet by mouth  nightly as needed  
  
 valsartan-hydroCHLOROthiazide 160-12.5 mg per tablet Commonly known as:  DIOVAN-HCT Take 1 tablet by mouth  daily We Performed the Following LIPID PANEL [22472 CPT(R)] Follow-up Instructions Return in about 6 months (around 10/24/2017) for FULL PHYSICAL - 30 minutes. Patient Instructions Starting a Weight Loss Plan: Care Instructions Your Care Instructions If you are thinking about losing weight, it can be hard to know where to start. Your doctor can help you set up a weight loss plan that best meets your needs. You may want to take a class on nutrition or exercise, or join a weight loss support group. If you have questions about how to make changes to your eating or exercise habits, ask your doctor about seeing a registered dietitian or an exercise specialist. 
It can be a big challenge to lose weight. But you do not have to make huge changes at once. Make small changes, and stick with them. When those changes become habit, add a few more changes. If you do not think you are ready to make changes right now, try to pick a date in the future. Make an appointment to see your doctor to discuss whether the time is right for you to start a plan. Follow-up care is a key part of your treatment and safety. Be sure to make and go to all appointments, and call your doctor if you are having problems. Its also a good idea to know your test results and keep a list of the medicines you take. How can you care for yourself at home? · Set realistic goals. Many people expect to lose much more weight than is likely. A weight loss of 5% to 10% of your body weight may be enough to improve your health. · Get family and friends involved to provide support. Talk to them about why you are trying to lose weight, and ask them to help. They can help by participating in exercise and having meals with you, even if they may be eating something different. · Find what works best for you. If you do not have time or do not like to cook, a program that offers meal replacement bars or shakes may be better for you. Or if you like to prepare meals, finding a plan that includes daily menus and recipes may be best. 
· Ask your doctor about other health professionals who can help you achieve your weight loss goals. ¨ A dietitian can help you make healthy changes in your diet.  
¨ An exercise specialist or  can help you develop a safe and effective exercise program. 
¨ A counselor or psychiatrist can help you cope with issues such as depression, anxiety, or family problems that can make it hard to focus on weight loss. · Consider joining a support group for people who are trying to lose weight. Your doctor can suggest groups in your area. Where can you learn more? Go to http://franny-shukri.info/. Enter M582 in the search box to learn more about \"Starting a Weight Loss Plan: Care Instructions. \" Current as of: October 13, 2016 Content Version: 11.2 © 5189-6489 [employer]. Care instructions adapted under license by [employer] (which disclaims liability or warranty for this information). If you have questions about a medical condition or this instruction, always ask your healthcare professional. [address] any warranty or liability for your use of this information. Introducing [facility] & HEALTH SERVICES! [employer] introduces [employer] patient portal. Now you can access parts of your medical record, email your doctor's office, and request medication refills online. 1. In your internet browser, go to [email or web address] 2. Click on the First Time User? Click Here link in the Sign In box. You will see the New Member Sign Up page. 3. Enter your [employer] Access Code exactly as it appears below. You will not need to use this code after youve completed the sign-up process. If you do not sign up before the expiration date, you must request a new code. · [employer] Access Code: [email or web address] Expires: 5/2/2017  4:04 PM 
 
4. Enter the last four digits of your Social Security Number (xxxx) and Date of Birth (mm/dd/yyyy) as indicated and click Submit. You will be taken to the next sign-up page. 5. Create a [employer] ID. This will be your [employer] login ID and cannot be changed, so think of one that is secure and easy to remember. 6. Create a [employer] password. You can change your password at any time. 7. Enter your Password Reset Question and Answer. This can be used at a later time if you forget your password. 8. Enter your e-mail address. You will receive e-mail notification when new information is available in [address] 9. Click Sign Up. You can now view and download portions of your medical record. 10. Click the Download Summary menu link to download a portable copy of your medical information. If you have questions, please visit the Frequently Asked Questions section of the [employer] website. Remember, [employer] is NOT to be used for urgent needs. For medical emergencies, dial 911. Now available from your iPhone and Android! Please provide this summary of care documentation to your next provider. Your primary care clinician is listed as Radha Busby. If you have any questions after today's visit, please call [phone].

## 2017-04-24 NOTE — PROGRESS NOTES
Elver Cortez is a 80 y.o. female who was seen in clinic today (4/24/2017). Patient was seen with Dr Choco Contreras (R2 at [facility]). Assessment & Plan:  Lesly Dill was seen today for high blood sugar, anxiety and coronary artery disease. Diagnoses and all orders for this visit:    Coronary artery disease involving native coronary artery of native heart without angina pectoris- BP is well controlled, lipids are uncontrolled. Has not had lipids checked in > 1yr, reviewed is off medications & can't tolerate statin. She is requesting testing, reviewed we have not been testing b/c she can't tolerate or afford medication options. Will order labs per her request.  Continue taking: current medications.      -     LIPID PANEL    Hypertension, essential- well controlled, continue current treatment     Mixed hyperlipidemia- see above, uncontrolled   -     LIPID PANEL    Gastroesophageal reflux disease without esophagitis- well controlled, continue current treatment     Anxiety- stable, could be better, reviewed options, did not tolerate Buspar 20mg/day but did review increasing to TID = 15mg/day. She will think about it. Reviewed needing to find life style changes & ways to destress naturally. Pre-diabetes- will defer labs, will work on diet & weight    Severe obesity (BMI 35.0-39.9) [facility]- poorly controlled, worsening, I have reviewed/discussed the above normal BMI with the patient. I have recommended the following interventions: encourage exercise and lifestyle education regarding diet. Fatigue, unspecified type- chronic, reviewed extensive w/u, will try stopping msk relaxer at night. Reviewed she is using it daily for more prn issues. Reviewed expected side effects & what to monitor for. Follow-up Disposition:  Return in about 6 months (around 10/24/2017) for FULL PHYSICAL - 30 minutes.        ----------------------------------------------------------------------    Subjective:  Cardiovascular Review  The patient has hypertension, hyperlipidemia and coronary artery disease. Since last visit: no changes. She reports taking medications as instructed, no medication side effects noted, patient does not perform home BP monitoring. Diet and Lifestyle: generally follows a low fat low cholesterol diet, generally follows a low sodium diet, exercises sporadically. Lab review: labs reviewed and discussed with patient. GI Review  Patient complains of GERD. She denies: abdominal bloating, heartburn, midepigastric pain and nausea. She has identified the following triggers: nothing. Medical therapy currently involves Pepcid. Mental Health Review  Patient is seen for anxiety. Since last visit: no major changes. Ongoing anxiety symptoms include: worrying about other people. Reports experiences the following side effects from the treatment: none. Prior to Admission medications    Medication Sig Start Date End Date Taking? Authorizing Provider   potassium chloride (K-DUR, KLOR-CON) 20 mEq tablet Take 1 Tab by mouth two (2) times a day.  2/24/17  Yes Tj Garcia MD   metanide [facility]) 1 mg tablet Take 1 tablet by mouth two  times daily 2/17/17  Yes Tj Garcia MD   famotidine (PEPCID) 40 mg tablet Take 1 tablet by mouth  daily 2/17/17  Yes Tj Garcia MD   valsartan-hydroCHLOROthiazide (DIOVAN-HCT) 160-12.5 mg per tablet Take 1 tablet by mouth  daily 2/17/17  Yes Tj Garcia MD   fluticasone [facility]) 50 mcg/actuation nasal spray Use 2 sprays in each  nostril every day 12/30/16  Yes Tj Garcia MD   busPIRone (BUSPAR) 5 mg tablet Take 1 tablet by mouth two  times daily 12/30/16  Yes Tj Garcia MD   tiZANidine (ZANAFLEX) 2 mg tablet Take 1 tablet by mouth  nightly as needed 12/30/16  Yes Tj Garcia MD   clobetasol (TEMOVATE) 0.05 % topical cream Apply  to affected area daily as needed for Skin Irritation or Itching. 4/21/16  Yes Wilhemenia Soulier Judge Eric MD   nitroglycerin (NITROSTAT) 0.4 mg SL tablet 1 Tab by SubLINGual route every five (5) minutes as needed for Chest Pain. 5/18/15  Yes Sheldon Rodriguez MD   CINNAMON BARK (CINNAMON PO) Take 1,000 mg by mouth two (2) times a day. Yes Historical Provider   cranberry 500 mg capsule Take 1,000 mg by mouth as needed. Yes Historical Provider   fexofenadine (ALLEGRA) 180 mg tablet Take 90 mg by mouth daily. 1/2 tablet BID   Yes Historical Provider   Comp. Stocking,Knee,Regular,Lrg Misc 2 Each by Does Not Apply route daily. 6/10/13  Yes Sheldon Rodriguez MD   aspirin 81 mg tablet Take 81 mg by mouth daily. 9/30/11  Yes Historical Provider          Allergies   Allergen Reactions    Bactrim [Sulfamethoprim Ds] Rash    Ciprocinonide Other (comments)     Pt states that she is not allergic to this medication. Did not tolerate Cipro (2017) - fatigue & aches    Macrobid [Nitrofurantoin Monohyd/M-Cryst] Unknown (comments)     Flu like      Naprosyn [Naproxen] Hives    Nsaids (Non-Steroidal Anti-Inflammatory Drug) Hives    Prozac [Fluoxetine] Hives    Statins-Hmg-Coa Reductase Inhibitors Myalgia     Did not tolerate simvastatin or atorvastatin    Sulfa (Sulfonamide Antibiotics) Hives    Vioxx [Rofecoxib] Hives     GI Upset    Zoloft [Sertraline] Hives           Review of Systems   Constitutional: Positive for malaise/fatigue. Negative for weight loss. Respiratory: Negative for cough and shortness of breath. Cardiovascular: Positive for leg swelling (stable). Negative for chest pain and palpitations. Gastrointestinal: Negative for abdominal pain, constipation, diarrhea, heartburn, nausea and vomiting. Genitourinary: Negative for frequency. Musculoskeletal: Positive for myalgias (muscle cramps, in legs, on/off, medication is helping). Negative for joint pain. Skin: Negative for rash. Neurological: Negative for tingling, sensory change, focal weakness and headaches.    Psychiatric/Behavioral: Negative for depression. The patient has insomnia. The patient is not nervous/anxious. Objective:   Physical Exam   Constitutional: She appears well-developed. No distress. obese   HENT:   Mouth/Throat: Mucous membranes are normal.   Eyes: Conjunctivae and lids are normal. No scleral icterus. Cardiovascular: Regular rhythm and normal heart sounds. No murmur heard. Pulmonary/Chest: Effort normal and breath sounds normal. She has no wheezes. She has no rales. Abdominal: Soft. Bowel sounds are normal. She exhibits no mass. There is no hepatosplenomegaly. There is no tenderness. Musculoskeletal: She exhibits no edema. Compression stocking present   Skin: No rash noted. Psychiatric: She has a normal mood and affect. Her behavior is normal.         Visit Vitals    /64    Pulse 70    Temp 97.8 °F (36.6 °C) (Oral)    Resp 20    Ht 5' 1\" (1.549 m)    Wt 187 lb (84.8 kg)    SpO2 99%    BMI 35.33 kg/m2         Disclaimer:  Advised her to call back or return to office if symptoms worsen/change/persist.  Discussed expected course/resolution/complications of diagnosis in detail with patient. Medication risks/benefits/costs/interactions/alternatives discussed with patient. She was given an after visit summary which includes diagnoses, current medications, & vitals. She expressed understanding with the diagnosis and plan.         Bull Huddleston MD

## 2017-04-28 RX ORDER — POTASSIUM CHLORIDE 20 MEQ/1
20 TABLET, EXTENDED RELEASE ORAL 2 TIMES DAILY
Qty: 60 TAB | Refills: 1 | Status: CANCELLED | OUTPATIENT
Start: 2017-04-28

## 2017-04-30 RX ORDER — POTASSIUM CHLORIDE 20 MEQ/1
TABLET, EXTENDED RELEASE ORAL
Qty: 60 TAB | Refills: 5 | Status: SHIPPED | OUTPATIENT
Start: 2017-04-30 | End: 2017-12-28 | Stop reason: ALTCHOICE

## 2017-05-03 ENCOUNTER — HOSPITAL ENCOUNTER (OUTPATIENT)
Dept: LAB | Age: 82
Discharge: HOME | End: 2017-05-03
Payer: MEDICARE

## 2017-05-03 PROCEDURE — 80061 LIPID PANEL: CPT

## 2017-05-03 PROCEDURE — 36415 COLL VENOUS BLD VENIPUNCTURE: CPT

## 2017-05-04 LAB
CHOLEST SERPL-MCNC: 205 MG/DL (ref 100–199)
HDLC SERPL-MCNC: 54 MG/DL
LDLC SERPL CALC-MCNC: 121 MG/DL (ref 0–99)
TRIGL SERPL-MCNC: 151 MG/DL (ref 0–149)
VLDLC SERPL CALC-MCNC: 30 MG/DL (ref 5–40)

## 2017-05-04 NOTE — PROGRESS NOTES
Letter sent to patient. Lipid stable. LDL > 100. Due to h/o allergy to statin will offer zetia but will not push it.   Will not recommend [facility] med at this time

## 2017-05-10 ENCOUNTER — TELEPHONE (OUTPATIENT)
Dept: INTERNAL MEDICINE CLINIC | Age: 82
End: 2017-05-10

## 2017-05-11 RX ORDER — EZETIMIBE 10 MG/1
10 TABLET ORAL DAILY
Qty: 90 TAB | Refills: 1 | Status: SHIPPED | COMMUNITY
Start: 2017-05-11 | End: 2018-02-28 | Stop reason: ALTCHOICE

## 2017-05-11 NOTE — TELEPHONE ENCOUNTER
Called patient and per pt rx requested to be sent by mail order. Rx sent to mail order per verbal order Dr Rocky Paul.

## 2017-05-15 ENCOUNTER — OFFICE VISIT (OUTPATIENT)
Dept: INTERNAL MEDICINE CLINIC | Age: 82
End: 2017-05-15

## 2017-05-15 VITALS
HEART RATE: 72 BPM | OXYGEN SATURATION: 99 % | SYSTOLIC BLOOD PRESSURE: 128 MMHG | WEIGHT: 180 LBS | RESPIRATION RATE: 18 BRPM | HEIGHT: 61 IN | TEMPERATURE: 98.2 F | BODY MASS INDEX: 33.99 KG/M2 | DIASTOLIC BLOOD PRESSURE: 60 MMHG

## 2017-05-15 DIAGNOSIS — R51.9 ACUTE INTRACTABLE HEADACHE, UNSPECIFIED HEADACHE TYPE: ICD-10-CM

## 2017-05-15 DIAGNOSIS — M54.2 NECK PAIN: Primary | ICD-10-CM

## 2017-05-15 NOTE — PROGRESS NOTES
Sena Jenkins is a 80 y.o. female who was seen in clinic today (5/15/2017). Assessment & Plan:  Gladis Kingston was seen today for headache and neck pain. Diagnoses and all orders for this visit:    Neck pain- this is a new problem, differential dx reviewed with the patient, sounds muscular. Reassured nothing that sounds serious. Will treat with: massage/PT, heat, rest, stretching. See AVS, Red flags were reviewed with the patient to RTC or notify me, expected time course for resolution reviewed. -     REFERRAL TO MASSAGE THERAPY    Acute intractable headache, unspecified headache type- new dx, sounds like related to above, reviewed differential & reassured. Follow-up Disposition:  Return if symptoms worsen or fail to improve.       ----------------------------------------------------------------------    Subjective:  She presents do to headache that is secondary to no known injury and started ~3 wks ago. Since it started her pain has not changed. She describes the pain as throbbing. It is constant but fluctuating in intensity. The pain starts in the neck and radiates up over to the top of her head. Exacerbating factors identifiable by patient are turning her head to the left. 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 She has tried the following: heat & Tylenol. Heat helps temporarily. Previous workup: none. Since this event she is able to do her normal daily activities. Prior to Admission medications    Medication Sig Start Date End Date Taking? Authorizing Provider   ezetimibe (ZETIA) 10 mg tablet Take 1 Tab by mouth daily.  5/11/17  Yes Brian Gómez MD   [facility]) 1 mg tablet Take 1 tablet by mouth two  times daily 2/17/17  Yes Brian Gómez MD   famotidine (PEPCID) 40 mg tablet Take 1 tablet by mouth  daily 2/17/17  Yes Brian Gómez MD   valsartan-hydroCHLOROthiazide (DIOVAN-HCT) 160-12.5 mg per tablet Take 1 tablet by mouth  daily 2/17/17  Yes Patrick Aguirre MD   fluticasone [facility]) 50 mcg/actuation nasal spray Use 2 sprays in each  nostril every day 12/30/16  Yes Patrick Aguirre MD   busPIRone (BUSPAR) 5 mg tablet Take 1 tablet by mouth two  times daily 12/30/16  Yes Patrick Aguirre MD   tiZANidine (ZANAFLEX) 2 mg tablet Take 1 tablet by mouth  nightly as needed 12/30/16  Yes Patrick Aguirre MD   clobetasol (TEMOVATE) 0.05 % topical cream Apply  to affected area daily as needed for Skin Irritation or Itching. 4/21/16  Yes Patrick Aguirre MD   nitroglycerin (NITROSTAT) 0.4 mg SL tablet 1 Tab by SubLINGual route every five (5) minutes as needed for Chest Pain. 5/18/15  Yes Patrick Aguirre MD   CINNAMON BARK (CINNAMON PO) Take 1,000 mg by mouth two (2) times a day. Yes Historical Provider   cranberry 500 mg capsule Take 1,000 mg by mouth as needed. Yes Historical Provider   fexofenadine (ALLEGRA) 180 mg tablet Take 90 mg by mouth daily. 1/2 tablet BID   Yes Historical Provider   Comp. Stocking,Knee,Regular,Lrg Misc 2 Each by Does Not Apply route daily. 6/10/13  Yes Patrick Aguirre MD   aspirin 81 mg tablet Take 81 mg by mouth daily. 9/30/11  Yes Historical Provider   potassium chloride (K-DUR, KLOR-CON) 20 mEq tablet TAKE 1 TABLET BY MOUTH TWICE DAILY 4/30/17   Patrick Aguirre MD          Allergies   Allergen Reactions    Bactrim [Sulfamethoprim Ds] Rash    Ciprocinonide Other (comments)     Pt states that she is not allergic to this medication.   Did not tolerate Cipro (2017) - fatigue & aches    Macrobid [Nitrofurantoin Monohyd/M-Cryst] Unknown (comments)     Flu like      Naprosyn [Naproxen] Hives    Nsaids (Non-Steroidal Anti-Inflammatory Drug) Hives    Prozac [Fluoxetine] Hives    Statins-Hmg-Coa Reductase Inhibitors Myalgia     Did not tolerate simvastatin or atorvastatin    Sulfa (Sulfonamide Antibiotics) Hives    Vioxx [Rofecoxib] Hives     GI Upset    Zoloft [Sertraline] Hives           Review of Systems   Constitutional: Negative for chills and fever. HENT: Negative for congestion, ear pain and sore throat. Eyes: Negative for blurred vision and double vision. Respiratory: Negative for cough and shortness of breath. Cardiovascular: Negative for chest pain and palpitations. Gastrointestinal: Negative for abdominal pain, nausea and vomiting. Genitourinary: Negative for dysuria, frequency and urgency. Musculoskeletal: Positive for neck pain. Neurological: Positive for headaches. Negative for dizziness, tremors, sensory change and focal weakness. Objective:   Physical Exam   Constitutional: No distress. Cardiovascular: Regular rhythm and normal heart sounds. No murmur heard. Pulmonary/Chest: Effort normal and breath sounds normal. She has no wheezes. She has no rales. Musculoskeletal:        Cervical back: She exhibits spasm (left paraspinal). She exhibits normal range of motion (slight pain w/ full ROM to the left), no tenderness, no deformity and no pain. Visit Vitals    /60    Pulse 72    Temp 98.2 °F (36.8 °C) (Oral)    Resp 18    Ht 5' 1\" (1.549 m)    Wt 180 lb (81.6 kg)    SpO2 99%    BMI 34.01 kg/m2         Disclaimer:  Advised her to call back or return to office if symptoms worsen/change/persist.  Discussed expected course/resolution/complications of diagnosis in detail with patient. Medication risks/benefits/costs/interactions/alternatives discussed with patient. She was given an after visit summary which includes diagnoses, current medications, & vitals. She expressed understanding with the diagnosis and plan.         Kat Wakefield MD

## 2017-05-15 NOTE — PROGRESS NOTES
Headache and neck pain x 2 weeks. Only way she gets relief is to use a heating pad. Stopped potassium a week ago.

## 2017-05-15 NOTE — PATIENT INSTRUCTIONS
Heat: apply heating pad 10-15 minutes at a time 3-4 times per day      Stretching:  Start stretching/exercises (see below). Try to do this 1-2 times per day as tolerated. Limit the amount of lifting to less then 25 lbs for the next week          Neck: Exercises  Your Care Instructions  Here are some examples of typical rehabilitation exercises for your condition. Start each exercise slowly. Ease off the exercise if you start to have pain. Your doctor or physical therapist will tell you when you can start these exercises and which ones will work best for you. How to do the exercises  Note: Stretching should make you feel a gentle stretch, but no pain. Stop any strengthening exercise that makes pain worse. Neck stretch    1. This stretch works best if you keep your shoulder down as you lean away from it. To help you remember to do this, start by relaxing your shoulders and lightly holding on to your thighs or your chair. 2. Tilt your head toward your shoulder and hold for 15 to 30 seconds. Let the weight of your head stretch your muscles. 3. If you would like a little added stretch, use your hand to gently and steadily pull your head toward your shoulder. For example, keeping your right shoulder down, lean your head to the left. 4. Repeat 2 to 4 times toward each shoulder. Diagonal neck stretch    1. Turn your head slightly toward the direction you will be stretching, and tilt your head diagonally toward your chest and hold for 15 to 30 seconds. 2. If you would like a little added stretch, use your hand to gently and steadily pull your head forward on the diagonal.  3. Repeat 2 to 4 times toward each side. Dorsal glide stretch    1. Sit or stand tall and look straight ahead. 2. Slowly tuck your chin as you glide your head backward over your body  3. Hold for a count of 6, and then relax for up to 10 seconds. 4. Repeat 8 to 12 times. Note: The dorsal glide stretches the back of the neck.  If you feel pain, do not glide so far back. Some people find this exercise easier to do while lying on their backs with an ice pack on the neck. Chest and shoulder stretch    1. Sit or stand tall and glide your head backward as in the dorsal glide stretch. 2. Raise both arms so that your hands are next to your ears. 3. Take a deep breath, and as you breathe out, lower your elbows down and behind your back. You will feel your shoulder blades slide down and together, and at the same time you will feel a stretch across your chest and the front of your shoulders. 4. Hold for about 6 seconds, and then relax for up to 10 seconds. 5. Repeat 8 to 12 times. Strengthening: Hands on head    1. Move your head backward, forward, and side to side against gentle pressure from your hands, holding each position for about 6 seconds. 2. Repeat 8 to 12 times. Follow-up care is a key part of your treatment and safety. Be sure to make and go to all appointments, and call your doctor if you are having problems. It's also a good idea to know your test results and keep a list of the medicines you take. Where can you learn more? Go to http://franny-shukri.info/. Enter P975 in the search box to learn more about \"Neck: Exercises. \"  Current as of: May 23, 2016  Content Version: 11.2  © 6537-5299 [employer]. Care instructions adapted under license by [employer] (which disclaims liability or warranty for this information). If you have questions about a medical condition or this instruction, always ask your healthcare professional. [place] [address] any warranty or liability for your use of this information.

## 2017-05-15 NOTE — MR AVS SNAPSHOT
Visit Information Date & Time Provider Department Dept. Phone Encounter #  
 5/15/2017  4:00 PM Kat Wakefield, [facility] [phone] [id number] Follow-up Instructions Return if symptoms worsen or fail to improve. Your Appointments 10/30/2017 11:40 AM  
Any with Gerry Viramontes MD  
[address] ([address]) Appt Note: 1 year PAP f/up- bring machine [address] [phone] [address] [place] [address] Road[address] [phone]  
  
    
 10/30/2017  2:30 PM  
Medicare Physical with Kat Wakefield MD  
[facility] Internal Medicine [address]) Appt Note: medicare wellness [address] [facility] [address] [place] [address]  
[address] [place]  
  
    
 3/29/2018  1:20 PM  
ESTABLISHED PATIENT with Lili Valenzuela MD  
[facility] ([address]) Appt Note: one year follow up  
 [address] [facility] [address]  
[address] [facility] [id number] Upcoming Health Maintenance Date Due  
 GLAUCOMA SCREENING Q2Y 6/5/2017 INFLUENZA AGE 9 TO ADULT 8/1/2017 MEDICARE YEARLY EXAM 10/27/2017 DTaP/Tdap/Td series (2 - Td) 10/20/2026 Allergies as of 5/15/2017  Review Complete On: 5/15/2017 By: Kat Wakefield MD  
  
 Severity Noted Reaction Type Reactions Bactrim [Sulfamethoprim Ds]  09/30/2011    Rash Ciprocinonide  09/30/2011    Other (comments) Pt states that she is not allergic to this medication. Did not tolerate Cipro (2017) - fatigue & aches Macrobid [Nitrofurantoin Monohyd/m-cryst]  09/30/2011    Unknown (comments) Flu like Naprosyn [Naproxen]  10/17/2011    Hives Nsaids (Non-steroidal Anti-inflammatory Drug)  09/30/2011    Hives Prozac [Fluoxetine]  10/17/2011    Hives Statins-hmg-coa Reductase Inhibitors  04/29/2015    Myalgia Did not tolerate simvastatin or atorvastatin  
 Sulfa (Sulfonamide Antibiotics)  02/28/2012   Topical Hives Vioxx [Rofecoxib]  09/30/2011    Hives GI Upset Zoloft [Sertraline]  09/30/2011    Hives Current Immunizations  Reviewed on 5/15/2017 Name Date Influenza High Dose Vaccine PF 10/26/2016, 10/13/2015, 11/1/2014, 11/11/2013 Influenza Vaccine Split 10/17/2011 Pneumococcal Conjugate (PCV-13) 10/13/2015 Pneumococcal Vaccine (Unspecified Type) 9/27/2006 TD Vaccine 9/27/1996 Tdap 10/20/2016 Zoster Vaccine, Live 11/15/2014 Reviewed by Saqib Rajput RN on 5/15/2017 at  4:08 PM  
You Were Diagnosed With   
  
 Codes Comments Neck pain    -  Primary ICD-10-CM: M54.2 ICD-9-CM: 723.1 Acute intractable headache, unspecified headache type     ICD-10-CM: R51 ICD-9-CM: 112. 0 Vitals BP Pulse Temp Resp Height(growth percentile) Weight(growth percentile) 128/60 72 98.2 °F (36.8 °C) (Oral) 18 5' 1\" (1.549 m) 180 lb (81.6 kg) SpO2 BMI OB Status Smoking Status 99% 34.01 kg/m2 Postmenopausal Former Smoker Vitals History BMI and BSA Data Body Mass Index Body Surface Area 34.01 kg/m 2 1.87 m 2 Preferred Pharmacy Pharmacy Name Phone 305 [facility], [address] [place] [address] [address] Hernan Gallardo [address] Your Updated Medication List  
  
   
This list is accurate as of: 5/15/17  4:44 PM.  Always use your most recent med list. ALLEGRA 180 mg tablet Generic drug:  fexofenadine Take 90 mg by mouth daily. 1/2 tablet BID  
  
 aspirin 81 mg tablet Take 81 mg by mouth daily. bumetanide 1 mg tablet Commonly known as:  Terressa Haymaker Take 1 tablet by mouth two  times daily  
  
 busPIRone 5 mg tablet Commonly known as:  BUSPAR Take 1 tablet by mouth two  times daily  CINNAMON PO  
 Take 1,000 mg by mouth two (2) times a day. clobetasol 0.05 % topical cream  
Commonly known as:  Shyrl Setter Apply  to affected area daily as needed for Skin Irritation or Itching. Comp. Stocking,Knee,Regular,Lrg Misc  
2 Each by Does Not Apply route daily. cranberry 500 mg capsule Take 1,000 mg by mouth as needed. ezetimibe 10 mg tablet Commonly known as:  Erendira Nanny Take 1 Tab by mouth daily. famotidine 40 mg tablet Commonly known as:  PEPCID Take 1 tablet by mouth  daily  
  
 fluticasone 50 mcg/actuation nasal spray Commonly known as:  Skipper Or Use 2 sprays in each  nostril every day  
  
 nitroglycerin 0.4 mg SL tablet Commonly known as:  NITROSTAT  
1 Tab by SubLINGual route every five (5) minutes as needed for Chest Pain.  
  
 potassium chloride 20 mEq tablet Commonly known as:  K-DUR, KLOR-CON  
TAKE 1 TABLET BY MOUTH TWICE DAILY  
  
 tiZANidine 2 mg tablet Commonly known as:  Gerarda Elia Take 1 tablet by mouth  nightly as needed  
  
 valsartan-hydroCHLOROthiazide 160-12.5 mg per tablet Commonly known as:  DIOVAN-HCT Take 1 tablet by mouth  daily We Performed the Following REFERRAL TO [facility] [LVM717 Custom] Follow-up Instructions Return if symptoms worsen or fail to improve. Referral Information Referral ID Referred By Referred To  
  
 [id number] JENN ALEJANDRA Not Available Visits Status Start Date End Date 1 New Request 5/15/17 5/15/18 If your referral has a status of pending review or denied, additional information will be sent to support the outcome of this decision. Patient Instructions Heat: apply heating pad 10-15 minutes at a time 3-4 times per day Stretching: 
Start stretching/exercises (see below). Try to do this 1-2 times per day as tolerated. Limit the amount of lifting to less then 25 lbs for the next week Neck: Exercises Your Care Instructions Here are some examples of typical rehabilitation exercises for your condition. Start each exercise slowly. Ease off the exercise if you start to have pain. Your doctor or physical therapist will tell you when you can start these exercises and which ones will work best for you. How to do the exercises Note: Stretching should make you feel a gentle stretch, but no pain. Stop any strengthening exercise that makes pain worse. Neck stretch 1. This stretch works best if you keep your shoulder down as you lean away from it. To help you remember to do this, start by relaxing your shoulders and lightly holding on to your thighs or your chair. 2. Tilt your head toward your shoulder and hold for 15 to 30 seconds. Let the weight of your head stretch your muscles. 3. If you would like a little added stretch, use your hand to gently and steadily pull your head toward your shoulder. For example, keeping your right shoulder down, lean your head to the left. 4. Repeat 2 to 4 times toward each shoulder. Diagonal neck stretch 1. Turn your head slightly toward the direction you will be stretching, and tilt your head diagonally toward your chest and hold for 15 to 30 seconds. 2. If you would like a little added stretch, use your hand to gently and steadily pull your head forward on the diagonal. 
3. Repeat 2 to 4 times toward each side. Dorsal glide stretch 1. Sit or stand tall and look straight ahead. 2. Slowly tuck your chin as you glide your head backward over your body 3. Hold for a count of 6, and then relax for up to 10 seconds. 4. Repeat 8 to 12 times. Note: The dorsal glide stretches the back of the neck. If you feel pain, do not glide so far back. Some people find this exercise easier to do while lying on their backs with an ice pack on the neck. Chest and shoulder stretch 1. Sit or stand tall and glide your head backward as in the dorsal glide stretch. 2. Raise both arms so that your hands are next to your ears. 3. Take a deep breath, and as you breathe out, lower your elbows down and behind your back. You will feel your shoulder blades slide down and together, and at the same time you will feel a stretch across your chest and the front of your shoulders. 4. Hold for about 6 seconds, and then relax for up to 10 seconds. 5. Repeat 8 to 12 times. Strengthening: Hands on head 1. Move your head backward, forward, and side to side against gentle pressure from your hands, holding each position for about 6 seconds. 2. Repeat 8 to 12 times. Follow-up care is a key part of your treatment and safety. Be sure to make and go to all appointments, and call your doctor if you are having problems. It's also a good idea to know your test results and keep a list of the medicines you take. Where can you learn more? Go to http://franny-shukri.info/. Enter P975 in the search box to learn more about \"Neck: Exercises. \" Current as of: May 23, 2016 Content Version: 11.2 © 5181-6695 Healthwise, Incorporated. Care instructions adapted under license by [employer] (which disclaims liability or warranty for this information). If you have questions about a medical condition or this instruction, always ask your healthcare professional. [address] any warranty or liability for your use of this information. Introducing [facility]! [facility] introduces Ixtens patient portal. Now you can access parts of your medical record, email your doctor's office, and request medication refills online. 1. In your internet browser, go to [email or web address] 2. Click on the First Time User? Click Here link in the Sign In box. You will see the New Member Sign Up page. 3. Enter your Ixtens Access Code exactly as it appears below.  You will not need to use this code after youve completed the sign-up process. If you do not sign up before the expiration date, you must request a new code. · [email or web address] Access Code: [email or web address] Expires: 8/13/2017  4:44 PM 
 
4. Enter the last four digits of your Social Security Number (xxxx) and Date of Birth (mm/dd/yyyy) as indicated and click Submit. You will be taken to the next sign-up page. 5. Create a [email or web address] ID. This will be your [email or web address] login ID and cannot be changed, so think of one that is secure and easy to remember. 6. Create a [email or web address] password. You can change your password at any time. 7. Enter your Password Reset Question and Answer. This can be used at a later time if you forget your password. 8. Enter your e-mail address. You will receive e-mail notification when new information is available in [address] 9. Click Sign Up. You can now view and download portions of your medical record. 10. Click the Download Summary menu link to download a portable copy of your medical information. If you have questions, please visit the Frequently Asked Questions section of the [email or web address] website. Remember, [email or web address] is NOT to be used for urgent needs. For medical emergencies, dial 911. Now available from your iPhone and Android! Please provide this summary of care documentation to your next provider. Your primary care clinician is listed as Margot Nova. If you have any questions after today's visit, please call [phone].

## 2017-05-16 NOTE — TELEPHONE ENCOUNTER
----- Message from Lillian Castro MD sent at 5/15/2017  5:07 PM EDT -----  Yes  ----- Message -----     From: Raheel Patel RN     Sent: 5/15/2017   4:49 PM       To: Lillian Castro MD    Ms Gerardo Minor wanted to know if she should go back on her potassium. Told her I would call her tomorrow.

## 2017-05-16 NOTE — TELEPHONE ENCOUNTER
Call to patient, spoke with , Elysia Espinosa, on HIPAA. Advised patient should be on potassium. He will let her know.

## 2017-05-22 DIAGNOSIS — F41.9 ANXIETY: ICD-10-CM

## 2017-05-22 RX ORDER — BUSPIRONE HYDROCHLORIDE 5 MG/1
TABLET ORAL
Qty: 180 TAB | Refills: 1 | Status: SHIPPED | OUTPATIENT
Start: 2017-05-22 | End: 2018-01-01 | Stop reason: SDUPTHER

## 2017-06-06 ENCOUNTER — TELEPHONE (OUTPATIENT)
Dept: INTERNAL MEDICINE CLINIC | Age: 82
End: 2017-06-06

## 2017-06-07 NOTE — TELEPHONE ENCOUNTER
Called pt and asked how her neck pain is doing. Pt stated her neck pain is still on going with headache and she feels it may be related to wearing the cpap. Asked pt was she taking Tinazidine. Pt states she stopped taking it due to feeling tired during the day, but will try to take it at night before she goes to bed to see if it will help with the neck pain.

## 2017-06-08 NOTE — TELEPHONE ENCOUNTER
Called pt and asked how she was feeling. Pt stated she had a good night sleep last night because she did take a whole muscle relaxer and applied heat to her neck and shoulder. Gave pt instructions and told her she is doing just what Dr Gavin Bo had advised. Pt stated the strap for cpap is the best strap they could give her and she will follow up with specialist if she needs to. Pt also asked when would Dr Gavin Bo like for pt to do labs again because her potassium had been increased.

## 2017-06-08 NOTE — TELEPHONE ENCOUNTER
I think her h/a is neck pain related. Try heat at night. Try 1/2 to 1 tab of msk relaxer.   If she thinks this is CPAP related should then f/u with specialist.

## 2017-06-19 ENCOUNTER — TELEPHONE (OUTPATIENT)
Dept: INTERNAL MEDICINE CLINIC | Age: 82
End: 2017-06-19

## 2017-06-19 NOTE — TELEPHONE ENCOUNTER
If congestion is only symptom, unlikely to need an abx. Would cont w/ flonase. Start nasal saline rinses. Start robitussin or mucinex. Inquire if any other symptoms (in particular sinus pain or fevers) and how long as this been going on for (getting better or worse).
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
## 2017-06-19 NOTE — TELEPHONE ENCOUNTER
Not sure how long it has been present. Sounds like it is viral/allergic more then bacterial.  Not sure an abx would be helpful w/o these settings. I think she is doing the right things and it should get better. If no better by Wed let me know and will call in abx.

## 2017-06-19 NOTE — TELEPHONE ENCOUNTER
Called pt and pt states her symptoms are nose congestion, watery eyes, cough. Pt has no fever or sinus pain and has been using robitussin dm and allegra and the nasal saline rinse. She states her biggest c/o is the nose congestion. And pt wants to be well by Thursday when she takes her friend who has no family to have surgery.

## 2017-06-19 NOTE — TELEPHONE ENCOUNTER
Called pt and let her know what she is doing are the right things and that if not better by Wed to call and let us know and will send in abx for her.

## 2017-06-20 ENCOUNTER — TELEPHONE (OUTPATIENT)
Dept: INTERNAL MEDICINE CLINIC | Age: 82
End: 2017-06-20

## 2017-06-20 RX ORDER — AMOXICILLIN 875 MG/1
875 TABLET, FILM COATED ORAL 2 TIMES DAILY
Qty: 14 TAB | Refills: 0 | Status: SHIPPED | OUTPATIENT
Start: 2017-06-20 | End: 2017-06-27

## 2017-06-21 ENCOUNTER — TELEPHONE (OUTPATIENT)
Dept: INTERNAL MEDICINE CLINIC | Age: 82
End: 2017-06-21

## 2017-06-26 ENCOUNTER — TELEPHONE (OUTPATIENT)
Dept: INTERNAL MEDICINE CLINIC | Age: 82
End: 2017-06-26

## 2017-06-27 RX ORDER — AMOXICILLIN 875 MG/1
TABLET, FILM COATED ORAL
Qty: 14 TAB | Refills: 0 | OUTPATIENT
Start: 2017-06-27

## 2017-06-27 NOTE — TELEPHONE ENCOUNTER
Called pt and pt states she is taking all the recommended medications. Pt is scheduled to come in on 06/28/17.

## 2017-06-27 NOTE — TELEPHONE ENCOUNTER
Called pt and pt stated she is still having a lot of nasal congestion and having moderate productive cough. Pt is coughing up some green mucus when using the saline spray. Pt states today is the last day of taking her abx.

## 2017-06-27 NOTE — TELEPHONE ENCOUNTER
If abx did not help this may not be bacterial.  Would make sure she is doing her Flonase & nasal saline rinses & Allegra. If she is doing all this then will need appt. If she is not using these needs to start them.

## 2017-06-28 ENCOUNTER — OFFICE VISIT (OUTPATIENT)
Dept: INTERNAL MEDICINE CLINIC | Age: 82
End: 2017-06-28

## 2017-06-28 VITALS
RESPIRATION RATE: 18 BRPM | HEIGHT: 61 IN | SYSTOLIC BLOOD PRESSURE: 136 MMHG | WEIGHT: 190 LBS | TEMPERATURE: 98 F | HEART RATE: 66 BPM | OXYGEN SATURATION: 99 % | DIASTOLIC BLOOD PRESSURE: 64 MMHG | BODY MASS INDEX: 35.87 KG/M2

## 2017-06-28 DIAGNOSIS — J01.00 ACUTE NON-RECURRENT MAXILLARY SINUSITIS: Primary | ICD-10-CM

## 2017-06-28 RX ORDER — BUMETANIDE 1 MG/1
1 TABLET ORAL 2 TIMES DAILY
COMMUNITY
End: 2017-07-10 | Stop reason: SDUPTHER

## 2017-06-28 NOTE — PATIENT INSTRUCTIONS
Sinusitis: Care Instructions  Your Care Instructions    Sinusitis is an infection of the lining of the sinus cavities in your head. Sinusitis often follows a cold. It causes pain and pressure in your head and face. In most cases, sinusitis gets better on its own in 1 to 2 weeks. But some mild symptoms may last for several weeks. Sometimes antibiotics are needed. Follow-up care is a key part of your treatment and safety. Be sure to make and go to all appointments, and call your doctor if you are having problems. It's also a good idea to know your test results and keep a list of the medicines you take. How can you care for yourself at home? · Take an over-the-counter pain medicine, such as acetaminophen (Tylenol), ibuprofen (Advil, Motrin), or naproxen (Aleve). Read and follow all instructions on the label. · If the doctor prescribed antibiotics, take them as directed. Do not stop taking them just because you feel better. You need to take the full course of antibiotics. · Be careful when taking over-the-counter cold or flu medicines and Tylenol at the same time. Many of these medicines have acetaminophen, which is Tylenol. Read the labels to make sure that you are not taking more than the recommended dose. Too much acetaminophen (Tylenol) can be harmful. · Breathe warm, moist air from a steamy shower, a hot bath, or a sink filled with hot water. Avoid cold, dry air. Using a humidifier in your home may help. Follow the directions for cleaning the machine. · Use saline (saltwater) nasal washes to help keep your nasal passages open and wash out mucus and bacteria. You can buy saline nose drops at a grocery store or drugstore. Or you can make your own at home by adding 1 teaspoon of salt and 1 teaspoon of baking soda to 2 cups of distilled water. If you make your own, fill a bulb syringe with the solution, insert the tip into your nostril, and squeeze gently. Shayla Lark your nose.   · Put a hot, wet towel or a warm gel pack on your face 3 or 4 times a day for 5 to 10 minutes each time. · Try a decongestant nasal spray like oxymetazoline (Afrin). Do not use it for more than 3 days in a row. Using it for more than 3 days can make your congestion worse. When should you call for help? Call your doctor now or seek immediate medical care if:  · You have new or worse swelling or redness in your face or around your eyes. · You have a new or higher fever. Watch closely for changes in your health, and be sure to contact your doctor if:  · You have new or worse facial pain. · The mucus from your nose becomes thicker (like pus) or has new blood in it. · You are not getting better as expected. Where can you learn more? Go to http://franny-shukri.info/. Enter N112 in the search box to learn more about \"Sinusitis: Care Instructions. \"  Current as of: July 29, 2016  Content Version: 11.3  © 2745-9180 Healthwise, Incorporated. Care instructions adapted under license by [employer] (which disclaims liability or warranty for this information). If you have questions about a medical condition or this instruction, always ask your healthcare professional. [place] [address] any warranty or liability for your use of this information.

## 2017-06-28 NOTE — PROGRESS NOTES
Jocelyn Busby is a [de-identified] y.o. female who was seen in clinic today (6/28/2017). Assessment & Plan:  Diagnoses and all orders for this visit:    1. Acute non-recurrent maxillary sinusitis- discussed diagnosis & treatment options, most likely viral at this time, reviewed the importance of avoiding unnecessary antibiotic therapy, reviewed which OTC medications to use and avoid, expected time course for resolution & red flags were reviewed with her to RTC or notify me. Follow-up Disposition:  Return if symptoms worsen or fail to improve.       ----------------------------------------------------------------------    Subjective: Krystal Hull was seen today for Cold Symptoms    URI Review  Krystal Hull returns to clinic today to talk about: URI symptoms for 7-10 days ago, which are gradually improving since that time but has not resolved. She also reports dry cough and sinus congestion. She denies a history of: post nasal drip, headache, fever, chills, rhinorrhea, chest congestion and SOB/THAPA. Treatments have included: Amoxicillin which have been somewhat effective. Relevant PMH: MARY JANE  Patient reports sick contacts: no.  She has been having some issues w/ her CPAP. Her previous neck pain resolved but has been coming back slightly, she attributes to sniffling. Prior to Admission medications    Medication Sig Start Date End Date Taking? Authorizing Provider   bumetanide (BUMEX) 1 mg tablet Take 1 mg by mouth two (2) times a day.    Yes Historical Provider   busPIRone (BUSPAR) 5 mg tablet Take 1 tablet by mouth two  times daily 5/22/17  Yes Lico John MD   potassium chloride (K-DUR, KLOR-CON) 20 mEq tablet TAKE 1 TABLET BY MOUTH TWICE DAILY 4/30/17  Yes Lico John MD   famotidine (PEPCID) 40 mg tablet Take 1 tablet by mouth  daily 2/17/17  Yes Lico John MD   valsartan-hydroCHLOROthiazide (DIOVAN-HCT) 160-12.5 mg per tablet Take 1 tablet by mouth  daily 2/17/17  Yes Brandan DORADO Beny Gaitan MD   fluticasone [facility]) 50 mcg/actuation nasal spray Use 2 sprays in each  nostril every day 12/30/16  Yes Bowen Hannah MD   tiZANidine (ZANAFLEX) 2 mg tablet Take 1 tablet by mouth  nightly as needed 12/30/16  Yes Bowen Hannah MD   clobetasol (TEMOVATE) 0.05 % topical cream Apply  to affected area daily as needed for Skin Irritation or Itching. 4/21/16  Yes Bowen Hannah MD   nitroglycerin (NITROSTAT) 0.4 mg SL tablet 1 Tab by SubLINGual route every five (5) minutes as needed for Chest Pain. 5/18/15  Yes Bowen Hannah MD   CINNAMON BARK (CINNAMON PO) Take 1,000 mg by mouth two (2) times a day. Yes Historical Provider   cranberry 500 mg capsule Take 1,000 mg by mouth as needed. Yes Historical Provider   fexofenadine (ALLEGRA) 180 mg tablet Take 90 mg by mouth daily. 1/2 tablet BID   Yes Historical Provider   Comp. Stocking,Knee,Regular,Lrg Misc 2 Each by Does Not Apply route daily. 6/10/13  Yes Bowen Hannah MD   aspirin 81 mg tablet Take 81 mg by mouth daily. 9/30/11  Yes Historical Provider   amoxicillin (AMOXIL) 875 mg tablet Take 1 Tab by mouth two (2) times a day for 7 days. 6/20/17 6/27/17  Bowen Hannah MD   ezetimibe (ZETIA) 10 mg tablet Take 1 Tab by mouth daily. 5/11/17   Bowen Hannah MD          Allergies   Allergen Reactions    Bactrim [Sulfamethoprim Ds] Rash    Ciprocinonide Other (comments)     Pt states that she is not allergic to this medication.   Did not tolerate Cipro (2017) - fatigue & aches    Macrobid [Nitrofurantoin Monohyd/M-Cryst] Unknown (comments)     Flu like      Naprosyn [Naproxen] Hives    Nsaids (Non-Steroidal Anti-Inflammatory Drug) Hives    Prozac [Fluoxetine] Hives    Statins-Hmg-Coa Reductase Inhibitors Myalgia     Did not tolerate simvastatin or atorvastatin    Sulfa (Sulfonamide Antibiotics) Hives    Vioxx [Rofecoxib] Hives     GI Upset    Zoloft [Sertraline] Hives           ROS : per HPI       Objective: Physical Exam   Constitutional: No distress. HENT:   Right Ear: Tympanic membrane is not erythematous and not bulging. No middle ear effusion. Left Ear: Tympanic membrane is not erythematous and not bulging. No middle ear effusion. Nose: Rhinorrhea present. No mucosal edema. Right sinus exhibits no maxillary sinus tenderness and no frontal sinus tenderness. Left sinus exhibits no maxillary sinus tenderness and no frontal sinus tenderness. Mouth/Throat: Uvula is midline and mucous membranes are normal. No oropharyngeal exudate or posterior oropharyngeal erythema. Eyes: Conjunctivae are normal. No scleral icterus. Neck: Neck supple. Cardiovascular: Regular rhythm and normal heart sounds. No murmur heard. Pulmonary/Chest: Effort normal and breath sounds normal. She has no wheezes. She has no rales. Lymphadenopathy:     She has no cervical adenopathy. Visit Vitals    /64    Pulse 66    Temp 98 °F (36.7 °C) (Oral)    Resp 18    Ht 5' 1\" (1.549 m)    Wt 190 lb (86.2 kg)    SpO2 99%    BMI 35.9 kg/m2         Disclaimer:  Advised her to call back or return to office if symptoms worsen/change/persist.  Discussed expected course/resolution/complications of diagnosis in detail with patient. Medication risks/benefits/costs/interactions/alternatives discussed with patient. She was given an after visit summary which includes diagnoses, current medications, & vitals. She expressed understanding with the diagnosis and plan.         Ena Monroe MD

## 2017-07-10 DIAGNOSIS — K21.9 GASTROESOPHAGEAL REFLUX DISEASE WITHOUT ESOPHAGITIS: ICD-10-CM

## 2017-07-10 RX ORDER — BUMETANIDE 1 MG/1
TABLET ORAL
Qty: 180 TAB | Refills: 1 | Status: SHIPPED | OUTPATIENT
Start: 2017-07-10 | End: 2017-11-29 | Stop reason: SDUPTHER

## 2017-07-10 RX ORDER — VALSARTAN AND HYDROCHLOROTHIAZIDE 160; 12.5 MG/1; MG/1
TABLET, FILM COATED ORAL
Qty: 90 TAB | Refills: 1 | Status: SHIPPED | OUTPATIENT
Start: 2017-07-10 | End: 2017-11-29 | Stop reason: SDUPTHER

## 2017-07-10 RX ORDER — FAMOTIDINE 40 MG/1
TABLET, FILM COATED ORAL
Qty: 90 TAB | Refills: 1 | Status: SHIPPED | OUTPATIENT
Start: 2017-07-10 | End: 2017-11-29 | Stop reason: SDUPTHER

## 2017-10-09 DIAGNOSIS — J30.2 OTHER SEASONAL ALLERGIC RHINITIS: ICD-10-CM

## 2017-10-09 RX ORDER — FLUTICASONE PROPIONATE 50 MCG
SPRAY, SUSPENSION (ML) NASAL
Qty: 48 G | Refills: 1 | Status: SHIPPED | OUTPATIENT
Start: 2017-10-09 | End: 2018-07-07 | Stop reason: SDUPTHER

## 2017-10-30 ENCOUNTER — OFFICE VISIT (OUTPATIENT)
Dept: INTERNAL MEDICINE CLINIC | Age: 82
End: 2017-10-30

## 2017-10-30 ENCOUNTER — HOSPITAL ENCOUNTER (OUTPATIENT)
Dept: LAB | Age: 82
Discharge: HOME | End: 2017-10-30
Payer: MEDICARE

## 2017-10-30 VITALS
OXYGEN SATURATION: 99 % | SYSTOLIC BLOOD PRESSURE: 136 MMHG | HEART RATE: 70 BPM | WEIGHT: 190 LBS | BODY MASS INDEX: 35.87 KG/M2 | TEMPERATURE: 98.1 F | HEIGHT: 61 IN | RESPIRATION RATE: 16 BRPM | DIASTOLIC BLOOD PRESSURE: 74 MMHG

## 2017-10-30 DIAGNOSIS — M85.89 OSTEOPENIA OF MULTIPLE SITES: ICD-10-CM

## 2017-10-30 DIAGNOSIS — E66.01 SEVERE OBESITY (BMI 35.0-39.9): ICD-10-CM

## 2017-10-30 DIAGNOSIS — F41.9 ANXIETY: ICD-10-CM

## 2017-10-30 DIAGNOSIS — Z00.00 MEDICARE ANNUAL WELLNESS VISIT, SUBSEQUENT: Primary | ICD-10-CM

## 2017-10-30 DIAGNOSIS — K21.9 GASTROESOPHAGEAL REFLUX DISEASE WITHOUT ESOPHAGITIS: ICD-10-CM

## 2017-10-30 DIAGNOSIS — Z13.39 SCREENING FOR ALCOHOLISM: ICD-10-CM

## 2017-10-30 DIAGNOSIS — I25.10 CORONARY ARTERY DISEASE INVOLVING NATIVE CORONARY ARTERY OF NATIVE HEART WITHOUT ANGINA PECTORIS: ICD-10-CM

## 2017-10-30 DIAGNOSIS — Z23 ENCOUNTER FOR IMMUNIZATION: ICD-10-CM

## 2017-10-30 DIAGNOSIS — G47.33 OSA (OBSTRUCTIVE SLEEP APNEA): ICD-10-CM

## 2017-10-30 DIAGNOSIS — Z71.89 ACP (ADVANCE CARE PLANNING): ICD-10-CM

## 2017-10-30 DIAGNOSIS — I10 HYPERTENSION, ESSENTIAL: ICD-10-CM

## 2017-10-30 PROCEDURE — 80053 COMPREHEN METABOLIC PANEL: CPT

## 2017-10-30 PROCEDURE — 36415 COLL VENOUS BLD VENIPUNCTURE: CPT

## 2017-10-30 NOTE — ACP (ADVANCE CARE PLANNING)
Advance Care Planning (ACP) Provider Note - Comprehensive     Date of ACP Conversation: 10/30/17  Persons included in Conversation:  patient  Length of ACP Conversation in minutes: <16 minutes (Non-Billable)    Authorized Decision Maker (if patient is incapable of making informed decisions): This person is: Healthcare Agent/Medical Power of  under Advance Directive          General ACP for ALL Patients with Decision Making Capacity:  Importance of advance care planning, including choosing a healthcare agent to communicate patient's healthcare decisions if patient lost the ability to make decisions, such as after a sudden illness or accident  Understanding of the healthcare agent role was assessed and information provided  Opportunity offered to explore how cultural, Temple, spiritual, or personal beliefs would affect decisions for future care  Exploration of values, goals, and preferences if recovery is not expected, even with continued medical treatment in the event of: Imminent death or severe, permanent brain injury    For Serious or Chronic Illness:  Understanding of CPR, goals and expected outcomes, benefits and burdens discussed. Understanding of medical condition  Information on CPR success rates provided (e.g. for CPR in hospital, survival to d/c at two weeks is 22%, for chronically ill or elderly/frail survival is less than 3%)    Interventions Provided:  Recommended communicating the plan and making copies for the healthcare agent, personal physician, and others as appropriate (e.g., health system)  Recommended review of completed ACP document annually or upon change in health status       She has an advanced directive - a copy HAS NOT been provided. Reviewed DNR/DNI and patient is not interested.

## 2017-10-30 NOTE — MR AVS SNAPSHOT
Visit Information Date & Time Provider Department Dept. Phone Encounter #  
 10/30/2017  2:30 PM Tracie Olson, [address] [facility] Internal Medicine [phone] [id number] Follow-up Instructions Return in about 4 months (around 2/28/2018), or if symptoms worsen or fail to improve, for Regular follow up. Your Appointments 11/9/2017 10:00 AM  
Any with Marcin Zayas MD  
[address] [facility] ([address]) Appt Note: 1 year PAP f/up- bring machine; pt r/s  
 [address] [facility] [address] [phone] [address] [facility] [address] [phone]  
  
    
 3/29/2018  1:20 PM  
ESTABLISHED PATIENT with Isabelle Desouza MD  
[facility] ([address]) Appt Note: one year follow up  
 [address]  
[address] [facility],[address] [facility] 7 [address] Upcoming Health Maintenance Date Due INFLUENZA AGE 9 TO ADULT 8/1/2017 MEDICARE YEARLY EXAM 10/27/2017 GLAUCOMA SCREENING Q2Y 7/25/2019 DTaP/Tdap/Td series (2 - Td) 10/20/2026 Allergies as of 10/30/2017  Review Complete On: 10/30/2017 By: Tracie Olson MD  
  
 Severity Noted Reaction Type Reactions Bactrim [Sulfamethoprim Ds]  09/30/2011    Rash Ciprocinonide  09/30/2011    Other (comments) Pt states that she is not allergic to this medication. Did not tolerate Cipro (2017) - fatigue & aches Macrobid [Nitrofurantoin Monohyd/m-cryst]  09/30/2011    Unknown (comments) Flu like Naprosyn [Naproxen]  10/17/2011    Hives Nsaids (Non-steroidal Anti-inflammatory Drug)  09/30/2011    Hives Prozac [Fluoxetine]  10/17/2011    Hives Statins-hmg-coa Reductase Inhibitors  04/29/2015    Myalgia Did not tolerate simvastatin or atorvastatin  
 Sulfa (Sulfonamide Antibiotics)  02/28/2012   Topical Hives Vioxx [Rofecoxib]  09/30/2011    Hives GI Upset Zoloft [Sertraline]  09/30/2011    Hives Current Immunizations  Reviewed on 6/28/2017 Name Date Influenza High Dose Vaccine PF 10/30/2017, 10/26/2016, 10/13/2015, 11/1/2014, 11/11/2013 Influenza Vaccine Split 10/17/2011 Pneumococcal Conjugate (PCV-13) 10/13/2015 TD Vaccine 9/27/1996 Tdap 10/20/2016 ZZZ-RETIRED (DO NOT USE) Pneumococcal Vaccine (Unspecified Type) 9/27/2006 Zoster Vaccine, Live 11/15/2014 Not reviewed this visit You Were Diagnosed With   
  
 Codes Comments Medicare annual wellness visit, subsequent    -  Primary ICD-10-CM: Z00.00 ICD-9-CM: V70.0 ACP (advance care planning)     ICD-10-CM: Z71.89 ICD-9-CM: V65.49 Encounter for immunization     ICD-10-CM: J93 ICD-9-CM: V03.89 Screening for alcoholism     ICD-10-CM: Z13.89 ICD-9-CM: V79.1 Severe obesity (BMI 35.0-39.9) (HCC)     ICD-10-CM: E66.01 
ICD-9-CM: 278.01 Anxiety     ICD-10-CM: F41.9 ICD-9-CM: 300.00 Coronary artery disease involving native coronary artery of native heart without angina pectoris     ICD-10-CM: I25.10 ICD-9-CM: 414.01 Hypertension, essential     ICD-10-CM: I10 
ICD-9-CM: 401.9 MARY JANE (obstructive sleep apnea)     ICD-10-CM: G47.33 
ICD-9-CM: 327.23 Osteopenia of multiple sites     ICD-10-CM: M85.89 ICD-9-CM: 733.90 Gastroesophageal reflux disease without esophagitis     ICD-10-CM: K21.9 ICD-9-CM: 530.81 Vitals BP Pulse Temp Resp Height(growth percentile) Weight(growth percentile) 136/74 70 98.1 °F (36.7 °C) (Oral) 16 5' 0.75\" (1.543 m) 190 lb (86.2 kg) SpO2 BMI OB Status Smoking Status 99% 36.2 kg/m2 Postmenopausal Former Smoker Vitals History BMI and BSA Data Body Mass Index Body Surface Area  
 36.2 kg/m 2 1.92 m 2 Preferred Pharmacy Pharmacy Name Phone [address] [facility], [address] [address] [place] [address] [address] [facility] [place] [address] Your Updated Medication List  
  
   
This list is accurate as of: 10/30/17  3:22 PM.  Always use your most recent med list. ALLEGRA 180 mg tablet Generic drug:  fexofenadine Take 90 mg by mouth daily. 1/2 tablet BID  
  
 aspirin 81 mg tablet Take 81 mg by mouth daily. bumetanide 1 mg tablet Commonly known as:  Kasandra Pate Take 1 tablet by mouth two  times daily  
  
 busPIRone 5 mg tablet Commonly known as:  BUSPAR Take 1 tablet by mouth two  times daily CINNAMON PO Take 1,000 mg by mouth two (2) times a day. clobetasol 0.05 % topical cream  
Commonly known as:  Charmian Erwin Apply  to affected area daily as needed for Skin Irritation or Itching. Comp. Stocking,Knee,Regular,Lrg Misc  
2 Each by Does Not Apply route daily. cranberry 500 mg capsule Take 1,000 mg by mouth as needed. ezetimibe 10 mg tablet Commonly known as:  Gerardine [place] Take 1 Tab by mouth daily. famotidine 40 mg tablet Commonly known as:  PEPCID Take 1 tablet by mouth  daily  
  
 fluticasone 50 mcg/actuation nasal spray Commonly known as:  FLONASE  
USE 2 SPRAYS IN EACH  NOSTRIL EVERY DAY  
  
 nitroglycerin 0.4 mg SL tablet Commonly known as:  NITROSTAT  
1 Tab by SubLINGual route every five (5) minutes as needed for Chest Pain.  
  
 potassium chloride 20 mEq tablet Commonly known as:  K-DUR, KLOR-CON  
TAKE 1 TABLET BY MOUTH TWICE DAILY  
  
 valsartan-hydroCHLOROthiazide 160-12.5 mg per tablet Commonly known as:  DIOVAN-HCT Take 1 tablet by mouth  daily We Performed the Following ADMIN INFLUENZA VIRUS VAC [ HCPCS] INFLUENZA VIRUS VACCINE, HIGH DOSE SEASONAL, PRESERVATIVE FREE [43819 CPT(R)] METABOLIC PANEL, COMPREHENSIVE [30863 CPT(R)] [place] ANNUAL ALCOHOL SCREEN 15 MIN [id number] HCP] Follow-up Instructions Return in about 4 months (around 2/28/2018), or if symptoms worsen or fail to improve, for Regular follow up. Patient Instructions Medicare Wellness Visit, Female The best way to live healthy is to have a healthy lifestyle by eating a well-balanced diet, exercising regularly, limiting alcohol and stopping smoking. Regular physical exams and screening tests are another way to keep healthy. Preventive exams provided by your health care provider can find health problems before they become diseases or illnesses. Preventive services including immunizations, screening tests, monitoring and exams can help you take care of your own health. All people over age 72 should have a pneumovax  and and a prevnar shot to prevent pneumonia. These are once in a lifetime unless you and your provider decide differently. All people over 65 should have a yearly flu shot and a tetanus vaccine every 10 years. A bone mass density to screen for osteoporosis or thinning of the bones should be done every 2 years after 65. Screening for diabetes mellitus with a blood sugar test should be done every year. Glaucoma is a disease of the eye due to increased ocular pressure that can lead to blindness and it should be done every year by an eye professional. 
 
Cardiovascular screening tests that check for elevated lipids (fatty part of blood) which can lead to heart disease and strokes should be done every 5 years. Colorectal screening that evaluates for blood or polyps in your colon should be done yearly as a stool test or every five years as a flexible sigmoidoscope or every 10 years as a colonoscopy up to age 76. Breast cancer screening with a mammogram is recommended biennially  for women age 54-69. Screening for cervical cancer with a pap smear and pelvic exam is recommended for women after age 72 years every 2 years up to age 79 or when the provider and patient decide to stop. If there is a history of cervical abnormalities or other increased risk for cancer then the test is recommended yearly. Hepatitis C screening is also recommended for anyone born between [birth year] through [address]. A shingles vaccine is also recommended once in a lifetime after age 61. Your Medicare Wellness Exam is recommended annually. Here is a list of your current Health Maintenance items with a due date: 
Health Maintenance Due Topic Date Due  
 Flu Vaccine  08/01/2017 [facility] Annual Well Visit  10/27/2017 Sarah Malik 8244 What is a living will? A living will is a legal form you use to write down the kind of care you want at the end of your life. It is used by the health professionals who will treat you if you aren't able to decide for yourself. If you put your wishes in writing, your loved ones and others will know what kind of care you want. They won't need to guess. This can ease your mind and be helpful to others. A living will is not the same as an estate or property will. An estate will explains what you want to happen with your money and property after you die. Is a living will a legal document? A living will is a legal document. Each state has its own laws about living little. If you move to another state, make sure that your living will is legal in the state where you now live. Or you might use a universal form that has been approved by many states. This kind of form can sometimes be completed and stored online. Your electronic copy will then be available wherever you have a connection to the Internet. In most cases, doctors will respect your wishes even if you have a form from a different state. · You don't need an  to complete a living will. But legal advice can be helpful if your state's laws are unclear, your health history is complicated, or your family can't agree on what should be in your living will. · You can change your living will at any time. Some people find that their wishes about end-of-life care change as their health changes. · In addition to making a living will, think about completing a medical power of  form. This form lets you name the person you want to make end-of-life treatment decisions for you (your \"health care agent\") if you're not able to. Many hospitals and nursing homes will give you the forms you need to complete a living will and a medical power of . · Your living will is used only if you can't make or communicate decisions for yourself anymore. If you become able to make decisions again, you can accept or refuse any treatment, no matter what you wrote in your living will. · Your state may offer an online registry. This is a place where you can store your living will online so the doctors and nurses who need to treat you can find it right away. What should you think about when creating a living will? Talk about your end-of-life wishes with your family members and your doctor. Let them know what you want. That way the people making decisions for you won't be surprised by your choices. Think about these questions as you make your living will: · Do you know enough about life support methods that might be used? If not, talk to your doctor so you know what might be done if you can't breathe on your own, your heart stops, or you're unable to swallow. · What things would you still want to be able to do after you receive life-support methods? Would you want to be able to walk? To speak? To eat on your own? To live without the help of machines? · If you have a choice, where do you want to be cared for? In your home? At a hospital or nursing home? · Do you want certain Zoroastrian practices performed if you become very ill? · If you have a choice at the end of your life, where would you prefer to die? At home? In a hospital or nursing home? Somewhere else? · Would you prefer to be buried or cremated? · Do you want your organs to be donated after you die? What should you do with your living will? · Make sure that your family members and your health care agent have copies of your living will. · Give your doctor a copy of your living will to keep in your medical record. If you have more than one doctor, make sure that each one has a copy. · You may want to put a copy of your living will where it can be easily found. Where can you learn more? Go to http://franny-shukri.info/. Enter S105 in the search box to learn more about \"Learning About Living Richard. \" Current as of: August 8, 2016 Content Version: 11.3 © 5985-7711 [employer]. Care instructions adapted under license by [employer] (which disclaims liability or warranty for this information). If you have questions about a medical condition or this instruction, always ask your healthcare professional. [address] any warranty or liability for your use of this information. Introducing hospitals & HEALTH SERVICES! Guerrero Lara introduces [employer] patient portal. Now you can access parts of your medical record, email your doctor's office, and request medication refills online. 1. In your internet browser, go to [email or web address]. [employer] 2. Click on the First Time User? Click Here link in the Sign In box. You will see the New Member Sign Up page. 3. Enter your Pix[employer] Access Code exactly as it appears below. You will not need to use this code after youve completed the sign-up process. If you do not sign up before the expiration date, you must request a new code. · [employer] Access Code: [email or web address] Expires: 1/28/2018  2:14 PM 
 
4. Enter the last four digits of your Social Security Number (xxxx) and Date of Birth (mm/dd/yyyy) as indicated and click Submit. You will be taken to the next sign-up page. 5. Create a Pix[employer] ID. This will be your [employer] login ID and cannot be changed, so think of one that is secure and easy to remember. 6. Create a [employer] password. You can change your password at any time. 7. Enter your Password Reset Question and Answer. This can be used at a later time if you forget your password. 8. Enter your e-mail address. You will receive e-mail notification when new information is available in [address] 9. Click Sign Up. You can now view and download portions of your medical record. 10. Click the Download Summary menu link to download a portable copy of your medical information. If you have questions, please visit the Frequently Asked Questions section of the [employer] website. Remember, [employer] is NOT to be used for urgent needs. For medical emergencies, dial 911. Now available from your iPhone and Android! Please provide this summary of care documentation to your next provider. Your primary care clinician is listed as Aria Morales. If you have any questions after today's visit, please call [phone].

## 2017-10-30 NOTE — PROGRESS NOTES
Margaret Mary is a [de-identified] y.o. female who was seen in clinic today (10/30/2017) for a full physical.      Assessment & Plan:   Diagnoses and all orders for this visit:    1. Medicare annual wellness visit, subsequent    2. ACP (advance care planning)    3. Encounter for immunization  -     INFLUENZA VIRUS VACCINE, HIGH DOSE SEASONAL, PRESERVATIVE FREE  -     ADMIN INFLUENZA VIRUS VAC    4. Screening for alcoholism  -     Annual  Alcohol Screen 15 min ()    5. Severe obesity (BMI 35.0-39.9) ([facility] [address]- stable, poorly controlled, needs improvement, I have reviewed/discussed the above normal BMI with the patient. I have recommended the following interventions: encourage exercise and lifestyle education regarding diet, reviewed making small life style changes to start and breaking bad habits. 6. Anxiety- stable, reviewed treatment options with her, reviewed life style changes to help improve mood, continue current treatment. 7. Coronary artery disease involving native coronary artery of native heart without angina pectoris- well controlled, worsening, new dx, new dx to me, chronic problem. BP is well controlled, lipids are poorly controlled. Continue: current plan. She is taking zetia, will defer repeating lipids (not fasting) and this is the only option for meds so regardless of the improvement will need to continue. .      -     METABOLIC PANEL, COMPREHENSIVE    8. Hypertension, essential- well controlled, continue current treatment pending review of labs   -     METABOLIC PANEL, COMPREHENSIVE    9. MARY JANE (obstructive sleep apnea)- improved with using some tape on CPAP, will defer to specialist and encouraged her to continue use    10. Osteopenia of multiple sites- reviewed previous DEXA, reviewed intervals we can repeat, will defer for another 12 years per her request    11. Gastroesophageal reflux disease without esophagitis- well controlled, continue current treatment and continue to avoid trigger foods. Follow-up Disposition:  Return in about 4 months (around 2/28/2018), or if symptoms worsen or fail to improve, for Regular follow up.        ------------------------------------------------------------------------------------------    Subjective: Annual Wellness Visit- Subsequent Visit    End of Life Planning: This was discussed with her today and she has an advanced directive - a copy HAS NOT been provided. Reviewed DNR/DNI and patient is not interested. Depression Screen:  PHQ over the last two weeks 10/30/2017   Little interest or pleasure in doing things Not at all   Feeling down, depressed or hopeless Not at all   Total Score PHQ 2 0         Fall Risk:   Fall Risk Assessment, last 12 mths 10/30/2017   Able to walk? Yes   Fall in past 12 months? No       Abuse Screen:  Abuse Screening Questionnaire 10/30/2017   Do you ever feel afraid of your partner? N   Are you in a relationship with someone who physically or mentally threatens you? N   Is it safe for you to go home? Y         Alcohol Risk Factor Screening: On any occasion during the past 3 months, have you had more than 3 drinks containing alcohol? No  Do you average more than 7 drinks per week? No    Hearing Loss: Hearing is good. Cognition Screen:  Has your family/caregiver stated any concerns about your memory: no  appropriate for age attention/concentration and decreased attention/concentration. She reports working to much at her [employer] ([employer] coming up). Her  left and this was hard on her. She reports her memory is worse the more active she is. Activities of Daily Living:    Requires assistance with: no ADLs, using a cane when outside  Home contains: handrails and grab bars  Exercise: not active    Adult Nutrition Screen:  No risk factors noted. Health Maintenance  Daily Aspirin: yes  Bone Density: UTD - done in 7/30/14  Glaucoma Screening: UTD    Immunizations:    Influenza: up to date. Tetanus: up to date. Shingles: up to date. Pneumonia: up to date. Cancer screening:    Cervical: reviewed guidelines, n/a. Breast: reviewed guidelines, reviewed SBE with her, UTD. Colon: guidelines reviewed, n/a. Patient Care Team:  Aquilino Stewart MD as PCP - General (Internal Medicine)  Zay Frey MD (Obstetrics & Gynecology)  Michael Chavez MD (Cardiology)  Jerson Atkins MD (Ophthalmology)  Skip Bellamy MD as Physician (Sleep Medicine)       In addition to the above issues we also reviewed the following acute and/or chronic problems:    Cardiovascular Review  The patient has hypertension, hyperlipidemia, and coronary artery disease. Since last visit: started on Zetia. She reports taking medications as instructed, no medication side effects noted, patient does not perform home BP monitoring. Diet and Lifestyle: generally follows a low fat low cholesterol diet, generally follows a low sodium diet, exercises sporadically. Lab review: labs reviewed and discussed with patient.       GI Review  Patient complains of GERD. She denies: abdominal bloating, heartburn, midepigastric pain and nausea. She has identified the following triggers: nothing. Medical therapy currently involves Pepcid.         Mental Health Review  Patient is seen for anxiety. Since last visit: no major changes. Ongoing anxiety symptoms include: nothing. She still reports worrying about other people. Reports experiences the following side effects from the treatment: none. The following sections were reviewed & updated as appropriate: PMH, PSH, FH, and SH.       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 Authorizing Provider   fluticasone (FLONASE) 50 mcg/actuation nasal spray USE 2 SPRAYS IN EACH  NOSTRIL EVERY DAY 10/9/17  Yes Gita Grullon MD   bumetanide [facility]) 1 mg tablet Take 1 tablet by mouth two  times daily 7/10/17  Yes Gtia Grullon MD   famotidine (PEPCID) 40 mg tablet Take 1 tablet by mouth  daily 7/10/17  Yes Gita Grullon MD   valsartan-hydroCHLOROthiazide (DIOVAN-HCT) 160-12.5 mg per tablet Take 1 tablet by mouth  daily 7/10/17  Yes Gita Grullon MD   busPIRone (BUSPAR) 5 mg tablet Take 1 tablet by mouth two  times daily 5/22/17  Yes Gita Grullon MD   ezetimibe (ZETIA) 10 mg tablet Take 1 Tab by mouth daily. 5/11/17  Yes Gita Grullon MD   potassium chloride (K-DUR, KLOR-CON) 20 mEq tablet TAKE 1 TABLET BY MOUTH TWICE DAILY 4/30/17  Yes Gita Grullon MD   clobetasol (TEMOVATE) 0.05 % topical cream Apply  to affected area daily as needed for Skin Irritation or Itching. 4/21/16  Yes Gita Grullon MD   nitroglycerin (NITROSTAT) 0.4 mg SL tablet 1 Tab by SubLINGual route every five (5) minutes as needed for Chest Pain. 5/18/15  Yes Gita Grullon MD   CINNAMON BARK (CINNAMON PO) Take 1,000 mg by mouth two (2) times a day. Yes Historical Provider   cranberry 500 mg capsule Take 1,000 mg by mouth as needed. Yes Historical Provider   fexofenadine (ALLEGRA) 180 mg tablet Take 90 mg by mouth daily. 1/2 tablet BID   Yes Historical Provider   Comp. Stocking,Knee,Regular,Lrg Misc 2 Each by Does Not Apply route daily. 6/10/13  Yes Gita Grullon MD   aspirin 81 mg tablet Take 81 mg by mouth daily.  9/30/11  Yes Historical Provider          Allergies   Allergen Reactions    Bactrim [Sulfamethoprim Ds] Rash    Ciprocinonide Other (comments)     Pt states that she is not allergic to this medication. Did not tolerate Cipro (2017) - fatigue & aches    Macrobid [Nitrofurantoin Monohyd/M-Cryst] Unknown (comments)     Flu like      Naprosyn [Naproxen] Hives    Nsaids (Non-Steroidal Anti-Inflammatory Drug) Hives    Prozac [Fluoxetine] Hives    Statins-Hmg-Coa Reductase Inhibitors Myalgia     Did not tolerate simvastatin or atorvastatin    Sulfa (Sulfonamide Antibiotics) Hives    Vioxx [Rofecoxib] Hives     GI Upset    Zoloft [Sertraline] Hives              Review of Systems   Constitutional: Negative for malaise/fatigue and weight loss. Respiratory: Negative for cough and shortness of breath. Cardiovascular: Negative for chest pain, palpitations and leg swelling. Gastrointestinal: Negative for abdominal pain, constipation, diarrhea, heartburn, nausea and vomiting. Genitourinary: Negative for frequency. Musculoskeletal: Negative for joint pain and myalgias. Skin: Negative for rash. Neurological: Negative for tingling, sensory change, focal weakness and headaches. Psychiatric/Behavioral: Negative for depression. The patient is not nervous/anxious and does not have insomnia. Objective:   Physical Exam   Constitutional: She appears well-developed. No distress. obese   HENT:   Mouth/Throat: Mucous membranes are normal.   Eyes: Conjunctivae and lids are normal. No scleral icterus. Neck: Neck supple. No thyromegaly present. Cardiovascular: Regular rhythm and normal heart sounds. No murmur heard. Pulmonary/Chest: Effort normal and breath sounds normal. She has no wheezes. She has no rales. Abdominal: Soft. Bowel sounds are normal. She exhibits no mass. There is no hepatosplenomegaly. There is no tenderness. Musculoskeletal: She exhibits no edema. Lymphadenopathy:     She has no cervical adenopathy. Skin: No rash noted. Psychiatric: She has a normal mood and affect.  Her behavior is normal.          Visit Vitals    BP 136/74    Pulse 70    Temp 98.1 °F (36.7 °C) (Oral)    Resp 16    Ht 5' 0.75\" (1.543 m)    Wt 190 lb (86.2 kg)    SpO2 99%    BMI 36.2 kg/m2          Advised her to call back or return to office if symptoms worsen/change/persist.  Discussed expected course/resolution/complications of diagnosis in detail with patient. Medication risks/benefits/costs/interactions/alternatives discussed with patient. She was given an after visit summary which includes diagnoses, current medications, & vitals. She expressed understanding with the diagnosis and plan.         Richrd Paget, MD

## 2017-10-30 NOTE — PATIENT INSTRUCTIONS
Medicare Wellness Visit, Female    The best way to live healthy is to have a healthy lifestyle by eating a well-balanced diet, exercising regularly, limiting alcohol and stopping smoking. Regular physical exams and screening tests are another way to keep healthy. Preventive exams provided by your health care provider can find health problems before they become diseases or illnesses. Preventive services including immunizations, screening tests, monitoring and exams can help you take care of your own health. All people over age 72 should have a pneumovax  and and a prevnar shot to prevent pneumonia. These are once in a lifetime unless you and your provider decide differently. All people over 65 should have a yearly flu shot and a tetanus vaccine every 10 years. A bone mass density to screen for osteoporosis or thinning of the bones should be done every 2 years after 65. Screening for diabetes mellitus with a blood sugar test should be done every year. Glaucoma is a disease of the eye due to increased ocular pressure that can lead to blindness and it should be done every year by an eye professional.    Cardiovascular screening tests that check for elevated lipids (fatty part of blood) which can lead to heart disease and strokes should be done every 5 years. Colorectal screening that evaluates for blood or polyps in your colon should be done yearly as a stool test or every five years as a flexible sigmoidoscope or every 10 years as a colonoscopy up to age 76. Breast cancer screening with a mammogram is recommended biennially  for women age 54-69. Screening for cervical cancer with a pap smear and pelvic exam is recommended for women after age 72 years every 2 years up to age 79 or when the provider and patient decide to stop. If there is a history of cervical abnormalities or other increased risk for cancer then the test is recommended yearly.     Hepatitis C screening is also recommended for anyone born between [birth year] through [address]. A shingles vaccine is also recommended once in a lifetime after age 61. Your Medicare Wellness Exam is recommended annually. Here is a list of your current Health Maintenance items with a due date:  Health Maintenance Due   Topic Date Due    Flu Vaccine  08/01/2017    Annual Well Visit  10/27/2017            Learning About Living Richard  What is a living will? A living will is a legal form you use to write down the kind of care you want at the end of your life. It is used by the health professionals who will treat you if you aren't able to decide for yourself. If you put your wishes in writing, your loved ones and others will know what kind of care you want. They won't need to guess. This can ease your mind and be helpful to others. A living will is not the same as an estate or property will. An estate will explains what you want to happen with your money and property after you die. Is a living will a legal document? A living will is a legal document. Each state has its own laws about living little. If you move to another state, make sure that your living will is legal in the state where you now live. Or you might use a universal form that has been approved by many states. This kind of form can sometimes be completed and stored online. Your electronic copy will then be available wherever you have a connection to the Internet. In most cases, doctors will respect your wishes even if you have a form from a different state. · You don't need an  to complete a living will. But legal advice can be helpful if your state's laws are unclear, your health history is complicated, or your family can't agree on what should be in your living will. · You can change your living will at any time. Some people find that their wishes about end-of-life care change as their health changes.   · In addition to making a living will, think about completing a medical power of  form. This form lets you name the person you want to make end-of-life treatment decisions for you (your \"health care agent\") if you're not able to. Many hospitals and nursing homes will give you the forms you need to complete a living will and a medical power of . · Your living will is used only if you can't make or communicate decisions for yourself anymore. If you become able to make decisions again, you can accept or refuse any treatment, no matter what you wrote in your living will. · Your state may offer an online registry. This is a place where you can store your living will online so the doctors and nurses who need to treat you can find it right away. What should you think about when creating a living will? Talk about your end-of-life wishes with your family members and your doctor. Let them know what you want. That way the people making decisions for you won't be surprised by your choices. Think about these questions as you make your living will:  · Do you know enough about life support methods that might be used? If not, talk to your doctor so you know what might be done if you can't breathe on your own, your heart stops, or you're unable to swallow. · What things would you still want to be able to do after you receive life-support methods? Would you want to be able to walk? To speak? To eat on your own? To live without the help of machines? · If you have a choice, where do you want to be cared for? In your home? At a hospital or nursing home? · Do you want certain Mandaeism practices performed if you become very ill? · If you have a choice at the end of your life, where would you prefer to die? At home? In a hospital or nursing home? Somewhere else? · Would you prefer to be buried or cremated? · Do you want your organs to be donated after you die? What should you do with your living will?   · Make sure that your family members and your health care agent have copies of your living will. · Give your doctor a copy of your living will to keep in your medical record. If you have more than one doctor, make sure that each one has a copy. · You may want to put a copy of your living will where it can be easily found. Where can you learn more? Go to http://franny-shukri.info/. Enter V306 in the search box to learn more about \"Learning About Living [place]. \"  Current as of: August 8, 2016  Content Version: 11.3  © 3254-9193 [employer]. Care instructions adapted under license by [employer] (which disclaims liability or warranty for this information). If you have questions about a medical condition or this instruction, always ask your healthcare professional. [address] any warranty or liability for your use of this information.

## 2017-10-30 NOTE — PROGRESS NOTES
Annual wellness. Having some memory and word finding issues. Dirk Parry is a [de-identified] y.o. female  who present for routine immunizations. she  denies any symptoms , reactions or allergies that would exclude them from being immunized today. Risks and adverse reactions were discussed and the VIS was given to them. All questions were addressed. she was observed for 5 min post injection. There were no reactions observed.     Eneida Richards RN

## 2017-10-31 LAB
ALBUMIN SERPL-MCNC: 4.5 G/DL (ref 3.5–4.7)
ALBUMIN/GLOB SERPL: 1.4 {RATIO} (ref 1.2–2.2)
ALP SERPL-CCNC: 85 IU/L (ref 39–117)
ALT SERPL-CCNC: 12 IU/L (ref 0–32)
AST SERPL-CCNC: 15 IU/L (ref 0–40)
BILIRUB SERPL-MCNC: 0.7 MG/DL (ref 0–1.2)
BUN SERPL-MCNC: 21 MG/DL (ref 8–27)
BUN/CREAT SERPL: 17 (ref 12–28)
CALCIUM SERPL-MCNC: 10.3 MG/DL (ref 8.7–10.3)
CHLORIDE SERPL-SCNC: 98 MMOL/L (ref 96–106)
CO2 SERPL-SCNC: 29 MMOL/L (ref 18–29)
CREAT SERPL-MCNC: 1.23 MG/DL (ref 0.57–1)
GFR SERPLBLD CREATININE-BSD FMLA CKD-EPI: 42 ML/MIN/1.73
GFR SERPLBLD CREATININE-BSD FMLA CKD-EPI: 48 ML/MIN/1.73
GLOBULIN SER CALC-MCNC: 3.2 G/DL (ref 1.5–4.5)
GLUCOSE SERPL-MCNC: 92 MG/DL (ref 65–99)
POTASSIUM SERPL-SCNC: 4.3 MMOL/L (ref 3.5–5.2)
PROT SERPL-MCNC: 7.7 G/DL (ref 6–8.5)
SODIUM SERPL-SCNC: 142 MMOL/L (ref 134–144)

## 2017-10-31 NOTE — PROGRESS NOTES
Letter sent to patient. All labs are stable or at goal for her. Cr up slightly, will monitor.   LFTs stable since starting zetia

## 2017-11-29 DIAGNOSIS — K21.9 GASTROESOPHAGEAL REFLUX DISEASE WITHOUT ESOPHAGITIS: ICD-10-CM

## 2017-11-29 RX ORDER — BUMETANIDE 1 MG/1
TABLET ORAL
Qty: 180 TAB | Refills: 1 | Status: SHIPPED | OUTPATIENT
Start: 2017-11-29 | End: 2018-08-25 | Stop reason: SDUPTHER

## 2017-11-29 RX ORDER — VALSARTAN AND HYDROCHLOROTHIAZIDE 160; 12.5 MG/1; MG/1
TABLET, FILM COATED ORAL
Qty: 90 TAB | Refills: 1 | Status: SHIPPED | OUTPATIENT
Start: 2017-11-29 | End: 2018-08-06 | Stop reason: ALTCHOICE

## 2017-11-29 RX ORDER — FAMOTIDINE 40 MG/1
TABLET, FILM COATED ORAL
Qty: 90 TAB | Refills: 1 | Status: SHIPPED | OUTPATIENT
Start: 2017-11-29 | End: 2018-07-05 | Stop reason: SDUPTHER

## 2017-12-28 ENCOUNTER — OFFICE VISIT (OUTPATIENT)
Dept: INTERNAL MEDICINE CLINIC | Age: 82
End: 2017-12-28

## 2017-12-28 VITALS
TEMPERATURE: 97.8 F | HEIGHT: 61 IN | WEIGHT: 192 LBS | DIASTOLIC BLOOD PRESSURE: 66 MMHG | HEART RATE: 68 BPM | OXYGEN SATURATION: 96 % | BODY MASS INDEX: 36.25 KG/M2 | RESPIRATION RATE: 20 BRPM | SYSTOLIC BLOOD PRESSURE: 124 MMHG

## 2017-12-28 DIAGNOSIS — R42 DIZZINESS: Primary | ICD-10-CM

## 2017-12-28 NOTE — MR AVS SNAPSHOT
Visit Information Date & Time Provider Department Dept. Phone Encounter #  
 12/28/2017 11:00 AM Srinivasa Perla, [facility] Internal Medicine [phone] [id number] Follow-up Instructions Return if symptoms worsen or fail to improve. Your Appointments 1/29/2018 11:40 AM  
Any with Hansa Burgos MD  
9[facility] ([address]) Appt Note: 1 year PAP f/up- bring machine; pt r/s  
 305 [facility] [address] [address] 9407 [facility], [address] [address]  
  
    
 2/28/2018  1:15 PM  
ROUTINE CARE with Srinivasa Perla MD  
[facility] Internal Medicine [address] [facility]) Appt Note: f/u [address]  
[address] [place]  
  
    
 3/29/2018  1:20 PM  
ESTABLISHED PATIENT with Deborah Larkin MD  
[facility] ([address] [facility]) Appt Note: one year follow up  
 7001 [facility] [address]  
[address] [facility] [address] Upcoming Health Maintenance Date Due  
 MEDICARE YEARLY EXAM 10/31/2018 GLAUCOMA SCREENING Q2Y 7/25/2019 DTaP/Tdap/Td series (2 - Td) 10/20/2026 Allergies as of 12/28/2017  Review Complete On: 12/28/2017 By: Srinivasa Perla MD  
  
 Severity Noted Reaction Type Reactions Bactrim [Sulfamethoprim Ds]  09/30/2011    Rash Ciprocinonide  09/30/2011    Other (comments) Pt states that she is not allergic to this medication. Did not tolerate Cipro (2017) - fatigue & aches Macrobid [Nitrofurantoin Monohyd/m-cryst]  09/30/2011    Unknown (comments) Flu like Naprosyn [Naproxen]  10/17/2011    Hives Nsaids (Non-steroidal Anti-inflammatory Drug)  09/30/2011    Hives Prozac [Fluoxetine]  10/17/2011    Hives Statins-hmg-coa Reductase Inhibitors  04/29/2015    Myalgia Did not tolerate simvastatin or atorvastatin  
 Sulfa (Sulfonamide Antibiotics)  02/28/2012   Topical Hives Vioxx [Rofecoxib]  09/30/2011    Hives GI Upset Zoloft [Sertraline]  09/30/2011    Hives Current Immunizations  Reviewed on 12/28/2017 Name Date Influenza High Dose Vaccine PF 10/30/2017, 10/26/2016, 10/13/2015, 11/1/2014, 11/11/2013 Influenza Vaccine Split 10/17/2011 Pneumococcal Conjugate (PCV-13) 10/13/2015 TD Vaccine 9/27/1996 Tdap 10/20/2016 ZZZ-RETIRED (DO NOT USE) Pneumococcal Vaccine (Unspecified Type) 9/27/2006 Zoster Vaccine, Live 11/15/2014 Reviewed by Portia Connolly RN on 12/28/2017 at 11:03 AM  
You Were Diagnosed With   
  
 Codes Comments Dizziness    -  Primary ICD-10-CM: F10 ICD-9-CM: 780.4 Vitals BP Pulse Temp Resp Height(growth percentile) Weight(growth percentile) 124/66 68 97.8 °F (36.6 °C) (Oral) 20 5' 0.75\" (1.543 m) 192 lb (87.1 kg) SpO2 BMI OB Status Smoking Status 96% 36.58 kg/m2 Postmenopausal Former Smoker BMI and BSA Data Body Mass Index Body Surface Area  
 36.58 kg/m 2 1.93 m 2 Preferred Pharmacy Pharmacy Name Phone 305 [facility], [address] [place] [address] [address] Your Updated Medication List  
  
   
This list is accurate as of: 12/28/17 11:38 AM.  Always use your most recent med list. ALLEGRA 180 mg tablet Generic drug:  fexofenadine Take 90 mg by mouth daily. 1/2 tablet BID  
  
 aspirin 81 mg tablet Take 81 mg by mouth daily. bumetanide 1 mg tablet Commonly known as:  Romana  TAKE 1 TABLET BY MOUTH TWO  TIMES DAILY  
  
 busPIRone 5 mg tablet Commonly known as:  BUSPAR Take 1 tablet by mouth two  times daily CINNAMON PO Take 1,000 mg by mouth two (2) times a day. clobetasol 0.05 % topical cream  
Commonly known as:  Ioana Olson Apply  to affected area daily as needed for Skin Irritation or Itching. Comp. Stocking,Knee,Regular,Lrg Misc  
2 Each by Does Not Apply route daily. cranberry 500 mg capsule Take 1,000 mg by mouth as needed. ezetimibe 10 mg tablet Commonly known as:  Elner Glassing Take 1 Tab by mouth daily. famotidine 40 mg tablet Commonly known as:  PEPCID  
TAKE 1 TABLET BY MOUTH  DAILY  
  
 fluticasone 50 mcg/actuation nasal spray Commonly known as:  FLONASE  
USE 2 SPRAYS IN EACH  NOSTRIL EVERY DAY  
  
 nitroglycerin 0.4 mg SL tablet Commonly known as:  NITROSTAT  
1 Tab by SubLINGual route every five (5) minutes as needed for Chest Pain.  
  
 valsartan-hydroCHLOROthiazide 160-12.5 mg per tablet Commonly known as:  DIOVAN-HCT  
TAKE 1 TABLET BY MOUTH  DAILY Follow-up Instructions Return if symptoms worsen or fail to improve. Patient Instructions Epley Maneuver for Vertigo: Exercises Your Care Instructions The Epley Maneuver is a series of movements your doctor may use to treat your vertigo. Here are the steps for the exercises. Your doctor or physical therapist will guide you through the movements. A single 10- to 15-minute session often is all that's needed. Crystal debris (canaliths) cause the vertigo. When your head is moved into different positions, the debris moves freely. This may cause your symptoms to stop. How to do the exercises Step 1 
 
1. You will sit on the doctor's exam table. Your legs will be out in front of you. The doctor or physical therapist will turn your head so that it is snf between looking straight ahead and looking to the side that causes the worst vertigo. 2. Without changing your head position, he or she will guide you back quickly. Your shoulders will be on the table. Your head will hang over the edge of the table.  At this point, the side of your head that is causing the worst vertigo will face the floor. You'll stay in this position for 30 seconds or until your symptoms stop. Step 2 1. Then, the doctor or physical therapist will turn your head to the other side. You don't need to lift your head. The other side of your head will face the floor. You will stay in this position for 30 seconds or until your symptoms stop. Step 3 1. The doctor or physical therapist will help you roll your body in the same direction that your head is facing. You will lie on your side. (For example, if you are looking to your right, you will roll onto your right side.) The side that causes the worst symptoms should be facing up. You'll stay in this position for another 30 seconds or until your symptoms stop. Step 4 
 
1. The doctor or physical therapist will then help you to sit back up. Your legs will hang off the table on the same side that you were facing. Follow-up care is a key part of your treatment and safety. Be sure to make and go to all appointments, and call your doctor if you are having problems. It's also a good idea to know your test results and keep a list of the medicines you take. Where can you learn more? Go to http://franny-shukri.info/. Enter S125 in the search box to learn more about \"Epley Maneuver for Vertigo: Exercises. \" Current as of: May 12, 2017 Content Version: 11.4 © 4545-5736 Healthwise, Incorporated. Care instructions adapted under license by [employer] (which disclaims liability or warranty for this information). If you have questions about a medical condition or this instruction, always ask your healthcare professional. [address] any warranty or liability for your use of this information. Introducing [facility] & HEALTH SERVICES! Ayad Richards introduces [employer] patient portal. Now you can access parts of your medical record, email your doctor's office, and request medication refills online.

## 2017-12-28 NOTE — PROGRESS NOTES
Dirk Parry is a [de-identified] y.o. female who was seen in clinic today (12/28/2017). Assessment & Plan:   Diagnoses and all orders for this visit:    1. Dizziness- new dx, improved but intermittent, I spent 15 minutes with the patient and >50% of the time was spent counseling on differential dx, expectations, treatment options, and red flags. Will start w/ Epley exercises. Reviewed positions/movements to monitor. Reviewed why I don't think this is a CNS issue, but if no improvement will need imaging. Follow-up Disposition:  Return if symptoms worsen or fail to improve. Subjective: Sukumar Crowley was seen today for [address] Follow Up  Dirk Parry is seen for follow up from recent urgent care visit to [facility] on 12/26/2017. We reviewed the the records (note not dictated & unavailable, labs & EKG reviewed - normal). She presented with dizziness. She reports symptoms started on 12/23 and woke up with dizziness, room spinning. Symptoms fluctuated over the last few days. Symptoms tend to be worse in the morning (turning over in bed and bending over). Associated ear symptoms: none. Associated CNS symptoms: none. She denies ear pain, ear pressure, tinnitus. Recent infections: none. Head trauma: denied. New medications: no.       Also reports some fatigue. Prior to Admission medications    Medication Sig Start Date End Date Taking?  Authorizing Provider   valsartan-hydroCHLOROthiazide (DIOVAN-HCT) 160-12.5 mg per tablet TAKE 1 TABLET BY MOUTH  DAILY 11/29/17  Yes Montse Schumacher MD   famotidine (PEPCID) 40 mg tablet TAKE 1 TABLET BY MOUTH  DAILY 11/29/17  Yes Montse Schumacher MD   bumetanide (BUMEX) 1 mg tablet TAKE 1 TABLET BY MOUTH TWO  TIMES DAILY 11/29/17  Yes Montse Schumacher MD   fluticasone [facility]) 50 mcg/actuation nasal spray USE 2 SPRAYS IN EACH  NOSTRIL EVERY DAY 10/9/17  Yes Montse Schumacher MD   busPIRone (BUSPAR) 5 mg tablet Take 1 tablet by mouth two  times daily 5/22/17  Yes Orville Bosworth, MD   clobetasol (TEMOVATE) 0.05 % topical cream Apply  to affected area daily as needed for Skin Irritation or Itching. 4/21/16  Yes Orville Bosworth, MD   CINNAMON BARK (CINNAMON PO) Take 1,000 mg by mouth two (2) times a day. Yes Historical Provider   cranberry 500 mg capsule Take 1,000 mg by mouth as needed. Yes Historical Provider   fexofenadine (ALLEGRA) 180 mg tablet Take 90 mg by mouth daily. 1/2 tablet BID   Yes Historical Provider   Comp. Stocking,Knee,Regular,Lrg Misc 2 Each by Does Not Apply route daily. 6/10/13  Yes Orville Bosworth, MD   aspirin 81 mg tablet Take 81 mg by mouth daily. 9/30/11  Yes Historical Provider   ezetimibe (ZETIA) 10 mg tablet Take 1 Tab by mouth daily. 5/11/17   Orville Bosworth, MD   nitroglycerin (NITROSTAT) 0.4 mg SL tablet 1 Tab by SubLINGual route every five (5) minutes as needed for Chest Pain. 5/18/15   Orville Bosworth, MD          Allergies   Allergen Reactions    Bactrim [Sulfamethoprim Ds] Rash    Ciprocinonide Other (comments)     Pt states that she is not allergic to this medication. Did not tolerate Cipro (2017) - fatigue & aches    Macrobid [Nitrofurantoin Monohyd/M-Cryst] Unknown (comments)     Flu like      Naprosyn [Naproxen] Hives    Nsaids (Non-Steroidal Anti-Inflammatory Drug) Hives    Prozac [Fluoxetine] Hives    Statins-Hmg-Coa Reductase Inhibitors Myalgia     Did not tolerate simvastatin or atorvastatin    Sulfa (Sulfonamide Antibiotics) Hives    Vioxx [Rofecoxib] Hives     GI Upset    Zoloft [Sertraline] Hives           Review of Systems   Constitutional: Negative for chills and fever. HENT: Negative for congestion, ear discharge, ear pain, hearing loss, sore throat and tinnitus. Respiratory: Negative for cough and shortness of breath. Cardiovascular: Negative for chest pain and palpitations.    Gastrointestinal: Negative for abdominal pain, constipation, diarrhea, nausea and vomiting. Musculoskeletal: Negative for neck pain. Neurological: Positive for dizziness. Negative for headaches. Objective:   Physical Exam   Cardiovascular: Regular rhythm and normal heart sounds. No murmur heard. Pulmonary/Chest: Effort normal and breath sounds normal. She has no wheezes. She has no rales. Neurological:   Ileene Belem testing was negative bilaterally but limited due to poor neck extension          Visit Vitals    /66    Pulse 68    Temp 97.8 °F (36.6 °C) (Oral)    Resp 20    Ht 5' 0.75\" (1.543 m)    Wt 192 lb (87.1 kg)    SpO2 96%    BMI 36.58 kg/m2         Disclaimer:  Advised her to call back or return to office if symptoms worsen/change/persist.  Discussed expected course/resolution/complications of diagnosis in detail with patient. Medication risks/benefits/costs/interactions/alternatives discussed with patient. She was given an after visit summary which includes diagnoses, current medications, & vitals. She expressed understanding with the diagnosis and plan. Aspects of this note may have been generated using voice recognition software. Despite editing, there may be some syntax errors.        Ruby Ureña MD

## 2017-12-28 NOTE — PATIENT INSTRUCTIONS
Epley Maneuver for Vertigo: Exercises  Your Care Instructions  The Epley Maneuver is a series of movements your doctor may use to treat your vertigo. Here are the steps for the exercises. Your doctor or physical therapist will guide you through the movements. A single 10- to 15-minute session often is all that's needed. Crystal debris (canaliths) cause the vertigo. When your head is moved into different positions, the debris moves freely. This may cause your symptoms to stop. How to do the exercises  Step 1    1. You will sit on the doctor's exam table. Your legs will be out in front of you. The doctor or physical therapist will turn your head so that it is penitentiary between looking straight ahead and looking to the side that causes the worst vertigo. 2. Without changing your head position, he or she will guide you back quickly. Your shoulders will be on the table. Your head will hang over the edge of the table. At this point, the side of your head that is causing the worst vertigo will face the floor. You'll stay in this position for 30 seconds or until your symptoms stop. Step 2    1. Then, the doctor or physical therapist will turn your head to the other side. You don't need to lift your head. The other side of your head will face the floor. You will stay in this position for 30 seconds or until your symptoms stop. Step 3    1. The doctor or physical therapist will help you roll your body in the same direction that your head is facing. You will lie on your side. (For example, if you are looking to your right, you will roll onto your right side.) The side that causes the worst symptoms should be facing up. You'll stay in this position for another 30 seconds or until your symptoms stop. Step 4    1. The doctor or physical therapist will then help you to sit back up. Your legs will hang off the table on the same side that you were facing. Follow-up care is a key part of your treatment and safety.  Be sure to make and go to all appointments, and call your doctor if you are having problems. It's also a good idea to know your test results and keep a list of the medicines you take. Where can you learn more? Go to http://franny-shukri.info/. Enter T200 in the search box to learn more about \"Epley Maneuver for Vertigo: Exercises. \"  Current as of: May 12, 2017  Content Version: 11.4  © 5020-8538 Healthwise, Incorporated. Care instructions adapted under license by [employer] (which disclaims liability or warranty for this information). If you have questions about a medical condition or this instruction, always ask your healthcare professional. [address] any warranty or liability for your use of this information.

## 2018-01-01 DIAGNOSIS — L90.0 LICHEN SCLEROSUS: ICD-10-CM

## 2018-01-01 DIAGNOSIS — F41.9 ANXIETY: ICD-10-CM

## 2018-01-01 RX ORDER — CLOBETASOL PROPIONATE 0.5 MG/G
CREAM TOPICAL
Qty: 60 G | OUTPATIENT
Start: 2018-01-01

## 2018-01-01 RX ORDER — ECONAZOLE NITRATE 10 MG/G
CREAM TOPICAL
Qty: 120 G | OUTPATIENT
Start: 2018-01-01

## 2018-01-01 RX ORDER — BUSPIRONE HYDROCHLORIDE 5 MG/1
TABLET ORAL
Qty: 180 TAB | Refills: 1 | Status: SHIPPED | OUTPATIENT
Start: 2018-01-01

## 2018-01-02 NOTE — TELEPHONE ENCOUNTER
Called pt and asked pt why is she requesting the 2 creams the patient states she is using the clobetasol for itching on the side of her legs. And asked pt why is she using the econazole and pt states she using that topically for vaginal purposes 2 to 3 x a week per her gyn.

## 2018-01-10 ENCOUNTER — TELEPHONE (OUTPATIENT)
Dept: INTERNAL MEDICINE CLINIC | Age: 83
End: 2018-01-10

## 2018-01-10 NOTE — TELEPHONE ENCOUNTER
The patient does not feel much better but does not feel worse, what should she do? She is sleeping a lot.

## 2018-01-10 NOTE — TELEPHONE ENCOUNTER
Returned pt call and pt is having c/o sleepiness and no energy. When speaking to her she stated she just woke up recently. Pt is scheduled to come in on this Friday to see Dr Yolanda Cali.

## 2018-01-12 ENCOUNTER — HOSPITAL ENCOUNTER (OUTPATIENT)
Dept: LAB | Age: 83
Discharge: HOME | End: 2018-01-12
Payer: MEDICARE

## 2018-01-12 ENCOUNTER — OFFICE VISIT (OUTPATIENT)
Dept: INTERNAL MEDICINE CLINIC | Age: 83
End: 2018-01-12

## 2018-01-12 VITALS
HEIGHT: 61 IN | RESPIRATION RATE: 18 BRPM | WEIGHT: 193 LBS | SYSTOLIC BLOOD PRESSURE: 132 MMHG | OXYGEN SATURATION: 98 % | HEART RATE: 68 BPM | TEMPERATURE: 97.9 F | DIASTOLIC BLOOD PRESSURE: 78 MMHG | BODY MASS INDEX: 36.44 KG/M2

## 2018-01-12 DIAGNOSIS — I25.10 CORONARY ARTERY DISEASE INVOLVING NATIVE CORONARY ARTERY OF NATIVE HEART WITHOUT ANGINA PECTORIS: Primary | ICD-10-CM

## 2018-01-12 DIAGNOSIS — F41.9 ANXIETY: ICD-10-CM

## 2018-01-12 DIAGNOSIS — E66.01 OBESITY, MORBID (HCC): ICD-10-CM

## 2018-01-12 DIAGNOSIS — G47.33 OSA (OBSTRUCTIVE SLEEP APNEA): ICD-10-CM

## 2018-01-12 DIAGNOSIS — E78.2 MIXED HYPERLIPIDEMIA: ICD-10-CM

## 2018-01-12 DIAGNOSIS — R53.83 FATIGUE, UNSPECIFIED TYPE: ICD-10-CM

## 2018-01-12 DIAGNOSIS — I10 HYPERTENSION, ESSENTIAL: ICD-10-CM

## 2018-01-12 DIAGNOSIS — R73.01 IMPAIRED FASTING GLUCOSE: ICD-10-CM

## 2018-01-12 DIAGNOSIS — R07.9 CHEST PAIN, UNSPECIFIED TYPE: ICD-10-CM

## 2018-01-12 PROCEDURE — 36415 COLL VENOUS BLD VENIPUNCTURE: CPT

## 2018-01-12 PROCEDURE — 83036 HEMOGLOBIN GLYCOSYLATED A1C: CPT

## 2018-01-12 PROCEDURE — 85027 COMPLETE CBC AUTOMATED: CPT

## 2018-01-12 PROCEDURE — 80053 COMPREHEN METABOLIC PANEL: CPT

## 2018-01-12 NOTE — MR AVS SNAPSHOT
Visit Information Date & Time Provider Department Dept. Phone Encounter #  
 1/12/2018  1:00 PM Ana Doll, [address] [place] [address] Internal Medicine [phone] [id number] Follow-up Instructions Return in about 4 months (around 5/12/2018). Your Appointments 1/29/2018 11:40 AM  
Any with Donald Cope MD  
[address] [place] ([facility]) Appt Note: 1 year PAP f/up- bring machine; pt r/s  
 305 [facility] [address] [phone] 9407 [facility]., [address] [address]  
  
    
 3/29/2018  1:20 PM  
ESTABLISHED PATIENT with Eusebio Dubin, MD  
[facility] ([facility]) Appt Note: one year follow up  
 [address] [facility] [address]  
[address] [facility] 7 [address] Upcoming Health Maintenance Date Due  
 MEDICARE YEARLY EXAM 10/31/2018 GLAUCOMA SCREENING Q2Y 7/25/2019 DTaP/Tdap/Td series (2 - Td) 10/20/2026 Allergies as of 1/12/2018  Review Complete On: 1/12/2018 By: Ana Doll MD  
  
 Severity Noted Reaction Type Reactions Bactrim [Sulfamethoprim Ds]  09/30/2011    Rash Ciprocinonide  09/30/2011    Other (comments) Pt states that she is not allergic to this medication. Did not tolerate Cipro (2017) - fatigue & aches Macrobid [Nitrofurantoin Monohyd/m-cryst]  09/30/2011    Unknown (comments) Flu like Naprosyn [Naproxen]  10/17/2011    Hives Nsaids (Non-steroidal Anti-inflammatory Drug)  09/30/2011    Hives Prozac [Fluoxetine]  10/17/2011    Hives Statins-hmg-coa Reductase Inhibitors  04/29/2015    Myalgia Did not tolerate simvastatin or atorvastatin  
 Sulfa (Sulfonamide Antibiotics)  02/28/2012   Topical Hives Vioxx [Rofecoxib]  09/30/2011    Hives GI Upset Zoloft [Sertraline]  09/30/2011    Hives Current Immunizations  Reviewed on 1/12/2018 Name Date Influenza High Dose Vaccine PF 10/30/2017, 10/26/2016, 10/13/2015, 11/1/2014, 11/11/2013 Influenza Vaccine Split 10/17/2011 Pneumococcal Conjugate (PCV-13) 10/13/2015 TD Vaccine 9/27/1996 Tdap 10/20/2016 ZZZ-RETIRED (DO NOT USE) Pneumococcal Vaccine (Unspecified Type) 9/27/2006 Zoster Vaccine, Live 11/15/2014 Reviewed by Carmelo Kelley RN on 1/12/2018 at  1:02 PM  
You Were Diagnosed With   
  
 Codes Comments Coronary artery disease involving native coronary artery of native heart without angina pectoris    -  Primary ICD-10-CM: I25.10 ICD-9-CM: 414.01 Hypertension, essential     ICD-10-CM: I10 
ICD-9-CM: 401.9 Mixed hyperlipidemia     ICD-10-CM: E78.2 ICD-9-CM: 272.2 Anxiety     ICD-10-CM: F41.9 ICD-9-CM: 300.00 Obesity, morbid ([address]     ICD-10-CM: E66.01 
ICD-9-CM: 278.01   
 MARY JANE (obstructive sleep apnea)     ICD-10-CM: G47.33 
ICD-9-CM: 327.23 Impaired fasting glucose     ICD-10-CM: R73.01 
ICD-9-CM: 790.21 Fatigue, unspecified type     ICD-10-CM: R53.83 ICD-9-CM: 780.79 Chest pain, unspecified type     ICD-10-CM: R07.9 ICD-9-CM: 786.50 Vitals BP Pulse Temp Resp Height(growth percentile) Weight(growth percentile) 132/78 68 97.9 °F (36.6 °C) (Oral) 18 5' 0.75\" (1.543 m) 193 lb (87.5 kg) SpO2 BMI OB Status Smoking Status 98% 36.77 kg/m2 Postmenopausal Former Smoker Vitals History BMI and BSA Data Body Mass Index Body Surface Area  
 36.77 kg/m 2 1.94 m 2 Preferred Pharmacy Pharmacy Name Phone 305 [facility], [address] [address] [place] [address] [address] Antonia[address] Your Updated Medication List  
  
   
This list is accurate as of: 1/12/18  1:36 PM.  Always use your most recent med list. ALLEGRA 180 mg tablet Generic drug:  fexofenadine Take 90 mg by mouth daily. 1/2 tablet BID  
  
 aspirin 81 mg tablet Take 81 mg by mouth daily. bumetanide 1 mg tablet Commonly known as:  Julien Reno TAKE 1 TABLET BY MOUTH TWO  TIMES DAILY  
  
 busPIRone 5 mg tablet Commonly known as:  BUSPAR  
TAKE 1 TABLET BY MOUTH TWO  TIMES DAILY CINNAMON PO Take 1,000 mg by mouth two (2) times a day. clobetasol 0.05 % topical cream  
Commonly known as:  Marielena Seashore Apply  to affected area daily as needed for Skin Irritation or Itching. Comp. Stocking,Knee,Regular,Lrg Misc  
2 Each by Does Not Apply route daily. cranberry 500 mg capsule Take 1,000 mg by mouth as needed. ezetimibe 10 mg tablet Commonly known as:  Ladi Getting Take 1 Tab by mouth daily. famotidine 40 mg tablet Commonly known as:  PEPCID  
TAKE 1 TABLET BY MOUTH  DAILY  
  
 fluticasone 50 mcg/actuation nasal spray Commonly known as:  FLONASE  
USE 2 SPRAYS IN EACH  NOSTRIL EVERY DAY  
  
 nitroglycerin 0.4 mg SL tablet Commonly known as:  NITROSTAT  
1 Tab by SubLINGual route every five (5) minutes as needed for Chest Pain.  
  
 valsartan-hydroCHLOROthiazide 160-12.5 mg per tablet Commonly known as:  DIOVAN-HCT  
TAKE 1 TABLET BY MOUTH  DAILY We Performed the Following CBC W/O DIFF [93047 CPT(R)] HEMOGLOBIN A1C WITH EAG [14906 CPT(R)] METABOLIC PANEL, COMPREHENSIVE [03760 CPT(R)] Follow-up Instructions Return in about 4 months (around 5/12/2018). Patient Instructions Please return in 4 months. Introducing [facility] & HEALTH SERVICES! Carmela Mitchell introduces [employer] patient portal. Now you can access parts of your medical record, email your doctor's office, and request medication refills online. 1. In your internet browser, go to [email or web address] 2. Click on the First Time User? Click Here link in the Sign In box. You will see the New Member Sign Up page. 3. Enter your [employer] Access Code exactly as it appears below.  You will not need to use this code after youve completed the sign-up process. If you do not sign up before the expiration date, you must request a new code. · Smallknot Access Code: [email or web address] Expires: 1/28/2018  1:14 PM 
 
4. Enter the last four digits of your Social Security Number (xxxx) and Date of Birth (mm/dd/yyyy) as indicated and click Submit. You will be taken to the next sign-up page. 5. Create a Smallknot ID. This will be your Smallknot login ID and cannot be changed, so think of one that is secure and easy to remember. 6. Create a Smallknot password. You can change your password at any time. 7. Enter your Password Reset Question and Answer. This can be used at a later time if you forget your password. 8. Enter your e-mail address. You will receive e-mail notification when new information is available in [address]. 9. Click Sign Up. You can now view and download portions of your medical record. 10. Click the Download Summary menu link to download a portable copy of your medical information. If you have questions, please visit the Frequently Asked Questions section of the Smallknot website. Remember, Smallknot is NOT to be used for urgent needs. For medical emergencies, dial 911. Now available from your iPhone and Android! Please provide this summary of care documentation to your next provider. Your primary care clinician is listed as Ples Dates. If you have any questions after today's visit, please call [phone].

## 2018-01-12 NOTE — PROGRESS NOTES
Blessing Omalley is a [de-identified] y.o. female who was seen in clinic today (1/12/2018). Assessment & Plan:   Diagnoses and all orders for this visit:    1. Coronary artery disease involving native coronary artery of native heart without angina pectoris- unclear control (see discussion regarding fatigue below). BP is well controlled, lipids are unknown but likely poorly controlled. Continue: current plan. 2. Hypertension, essential- well controlled, continue current treatment     3. Mixed hyperlipidemia- unclear, not taking zetia, did not tolerate statin, recommended to start    4. Anxiety- stable, continue current treatment     5. Obesity, morbid ([address]- poorly controlled, needs improvement, I have reviewed/discussed the above normal BMI with the patient. I have recommended the following interventions: encourage exercise and lifestyle education regarding diet. 6. MARY JANE (obstructive sleep apnea)- unknown control, having issues w/ mask (fitting & neck pain), she will d/w specialist    7. Impaired fasting glucose- overdue for labs, reviewed life style changes  -     HEMOGLOBIN A1C WITH EAG    8. Fatigue, unspecified type- this is a new problem, symptoms are: not changed, differential dx reviewed with the patient, nothing clear cut. Favor issues w/ MARY JANE or cardiac. Will check labs below to r/o obvious etiology. If normal will have her see cardiology as she did have classic symptoms prior to 1st stent. Red flags were reviewed with the patient to RTC or notify me. -     CBC W/O DIFF  -     METABOLIC PANEL, COMPREHENSIVE    9. Chest pain, unspecified type- new dx, reviewed differential dx, doubt cardiac but based on #8 if labs are normal need to f/u cardiologist.         Follow-up Disposition:  Return in about 4 months (around 5/12/2018), or if symptoms worsen or fail to improve. Subjective: Trevor Rahman was seen today for Fatigue; Hypertension;  Anxiety; and Cholesterol Problem    Cardiovascular Review  The patient has hypertension, hyperlipidemia, and coronary artery disease.  Since last visit: never started zetia.  She reports taking medications as instructed, no medication side effects noted, patient does not perform home BP monitoring.  Diet and Lifestyle: generally follows a low fat low cholesterol diet, generally follows a low sodium diet, exercises sporadically.  Lab review: labs reviewed and discussed with patient.        GI Review  Patient complains of GERD.  She denies: abdominal bloating, heartburn, midepigastric pain and nausea.  She has identified the following triggers: tomato based foods.  Medical therapy currently involves Pepcid.      [facility] Review  Patient is seen for anxiety.  Since last visit: no major changes.  Ongoing anxiety symptoms include: nothing. She still reports worrying about other people.  Reports experiences the following side effects from the treatment: none. Brief Labs:     Lab Results   Component Value Date/Time    Sodium 142 10/30/2017 03:59 PM    Potassium 4.3 10/30/2017 03:59 PM    Creatinine 1.23 10/30/2017 03:59 PM    Cholesterol, total 205 05/03/2017 09:32 AM    HDL Cholesterol 54 05/03/2017 09:32 AM    LDL, calculated 121 05/03/2017 09:32 AM    Triglyceride 151 05/03/2017 09:32 AM    Hemoglobin A1c 6.2 01/19/2017 01:58 PM             Prior to Admission medications    Medication Sig Start Date End Date Taking?  Authorizing Provider   busPIRone (BUSPAR) 5 mg tablet TAKE 1 TABLET BY MOUTH TWO  TIMES DAILY 1/1/18  Yes Alysia Parry MD   valsartan-hydroCHLOROthiazide (DIOVAN-HCT) 160-12.5 mg per tablet TAKE 1 TABLET BY MOUTH  DAILY 11/29/17  Yes Alysia Parry MD   famotidine (PEPCID) 40 mg tablet TAKE 1 TABLET BY MOUTH  DAILY 11/29/17  Yes Alysia Parry MD   bumetanide (BUMEX) 1 mg tablet TAKE 1 TABLET BY MOUTH TWO  TIMES DAILY 11/29/17  Yes Alysia Parry MD   fluticasone (FLONASE) 50 mcg/actuation nasal spray USE 2 SPRAYS IN [facility] NOSTRIL EVERY DAY 10/9/17  Yes Shantel Somers MD   ezetimibe (ZETIA) 10 mg tablet Take 1 Tab by mouth daily. 5/11/17  No Shantel Somers MD   clobetasol (TEMOVATE) 0.05 % topical cream Apply  to affected area daily as needed for Skin Irritation or Itching. 4/21/16  Yes Shantel Somers MD   nitroglycerin (NITROSTAT) 0.4 mg SL tablet 1 Tab by SubLINGual route every five (5) minutes as needed for Chest Pain. 5/18/15  Yes Shantel Somers MD   CINNAMON BARK (CINNAMON PO) Take 1,000 mg by mouth two (2) times a day. Yes Historical Provider   cranberry 500 mg capsule Take 1,000 mg by mouth as needed. Yes Historical Provider   fexofenadine (ALLEGRA) 180 mg tablet Take 90 mg by mouth daily. 1/2 tablet BID   Yes Historical Provider   Comp. Stocking,Knee,Regular,Lrg Misc 2 Each by Does Not Apply route daily. 6/10/13  Yes Shantel Somers MD   aspirin 81 mg tablet Take 81 mg by mouth daily. 9/30/11  Yes Historical Provider          Allergies   Allergen Reactions    Bactrim [Sulfamethoprim Ds] Rash    Ciprocinonide Other (comments)     Pt states that she is not allergic to this medication. Did not tolerate Cipro (2017) - fatigue & aches    Macrobid [Nitrofurantoin Monohyd/M-Cryst] Unknown (comments)     Flu like      Naprosyn [Naproxen] Hives    Nsaids (Non-Steroidal Anti-Inflammatory Drug) Hives    Prozac [Fluoxetine] Hives    Statins-Hmg-Coa Reductase Inhibitors Myalgia     Did not tolerate simvastatin or atorvastatin    Sulfa (Sulfonamide Antibiotics) Hives    Vioxx [Rofecoxib] Hives     GI Upset    Zoloft [Sertraline] Hives           Review of Systems   Constitutional: Positive for malaise/fatigue. Negative for weight loss. L breast pain - has occurred twice in the last 2 days, \"digging pain\", occurs 2-3 times then stop over a few seconds. Occurs w/ rest.  No other symptoms. Has nitro from cardiology but has not used.       Fatigue- has been getting worse, has been needing to take naps in the middle of the day but now feels tired all the time. She is nodding off while talking or watching tv   Respiratory: Negative for cough and shortness of breath. Cardiovascular: Negative for chest pain, palpitations and leg swelling. Gastrointestinal: Negative for abdominal pain, constipation, diarrhea, heartburn, nausea and vomiting. Genitourinary: Negative for frequency. Musculoskeletal: Negative for joint pain and myalgias. Skin: Negative for rash. Neurological: Negative for tingling, sensory change, focal weakness and headaches. Psychiatric/Behavioral: Negative for depression. The patient is not nervous/anxious and does not have insomnia. Objective:   Physical Exam   Constitutional: She appears well-developed. No distress. obese   HENT:   Mouth/Throat: Mucous membranes are normal.   Eyes: Conjunctivae and lids are normal. No scleral icterus. Neck: Neck supple. No thyromegaly present. Cardiovascular: Regular rhythm and normal heart sounds. No murmur heard. Pulmonary/Chest: Effort normal and breath sounds normal. She has no wheezes. She has no rales. Abdominal: Soft. Bowel sounds are normal. She exhibits no mass. There is no hepatosplenomegaly. There is no tenderness. Musculoskeletal: She exhibits no edema. Lymphadenopathy:     She has no cervical adenopathy. Skin: No rash noted. Psychiatric: She has a normal mood and affect. Her behavior is normal.         Visit Vitals    /78    Pulse 68    Temp 97.9 °F (36.6 °C) (Oral)    Resp 18    Ht 5' 0.75\" (1.543 m)    Wt 193 lb (87.5 kg)    SpO2 98%    BMI 36.77 kg/m2         Disclaimer:  Advised her to call back or return to office if symptoms worsen/change/persist.  Discussed expected course/resolution/complications of diagnosis in detail with patient. Medication risks/benefits/costs/interactions/alternatives discussed with patient.   She was given an after visit summary which includes diagnoses, current medications, & vitals. She expressed understanding with the diagnosis and plan. Aspects of this note may have been generated using voice recognition software. Despite editing, there may be some syntax errors.        Priya Campbell MD

## 2018-01-13 LAB
ALBUMIN SERPL-MCNC: 4.4 G/DL (ref 3.5–4.7)
ALBUMIN/GLOB SERPL: 1.3 {RATIO} (ref 1.2–2.2)
ALP SERPL-CCNC: 85 IU/L (ref 39–117)
ALT SERPL-CCNC: 13 IU/L (ref 0–32)
AST SERPL-CCNC: 19 IU/L (ref 0–40)
BILIRUB SERPL-MCNC: 0.6 MG/DL (ref 0–1.2)
BUN SERPL-MCNC: 17 MG/DL (ref 8–27)
BUN/CREAT SERPL: 19 (ref 12–28)
CALCIUM SERPL-MCNC: 10.2 MG/DL (ref 8.7–10.3)
CHLORIDE SERPL-SCNC: 96 MMOL/L (ref 96–106)
CO2 SERPL-SCNC: 31 MMOL/L (ref 18–29)
CREAT SERPL-MCNC: 0.88 MG/DL (ref 0.57–1)
ERYTHROCYTE [DISTWIDTH] IN BLOOD BY AUTOMATED COUNT: 13.4 % (ref 12.3–15.4)
EST. AVERAGE GLUCOSE BLD GHB EST-MCNC: 117 MG/DL
GLOBULIN SER CALC-MCNC: 3.4 G/DL (ref 1.5–4.5)
GLUCOSE SERPL-MCNC: 87 MG/DL (ref 65–99)
HBA1C MFR BLD: 5.7 % (ref 4.8–5.6)
HCT VFR BLD AUTO: 34.7 % (ref 34–46.6)
HGB BLD-MCNC: 11.3 G/DL (ref 11.1–15.9)
MCH RBC QN AUTO: 29 PG (ref 26.6–33)
MCHC RBC AUTO-ENTMCNC: 32.6 G/DL (ref 31.5–35.7)
MCV RBC AUTO: 89 FL (ref 79–97)
PLATELET # BLD AUTO: 303 X10E3/UL (ref 150–379)
POTASSIUM SERPL-SCNC: 3.5 MMOL/L (ref 3.5–5.2)
PROT SERPL-MCNC: 7.8 G/DL (ref 6–8.5)
RBC # BLD AUTO: 3.89 X10E6/UL (ref 3.77–5.28)
SODIUM SERPL-SCNC: 141 MMOL/L (ref 134–144)
WBC # BLD AUTO: 9 X10E3/UL (ref 3.4–10.8)

## 2018-01-15 ENCOUNTER — TELEPHONE (OUTPATIENT)
Dept: INTERNAL MEDICINE CLINIC | Age: 83
End: 2018-01-15

## 2018-01-15 NOTE — PROGRESS NOTES
Called pt and informed her all labs are at goal and that she should follow up with cardiologist and for her fatigue and hx of CAD.

## 2018-01-15 NOTE — PROGRESS NOTES
Please call patient. All labs are stable or at goal of her. She should f/u with cardiologist due to fatigue and h/o CAD.

## 2018-01-19 ENCOUNTER — OFFICE VISIT (OUTPATIENT)
Dept: CARDIOLOGY CLINIC | Age: 83
End: 2018-01-19

## 2018-01-19 VITALS
WEIGHT: 190.8 LBS | BODY MASS INDEX: 36.02 KG/M2 | HEART RATE: 60 BPM | HEIGHT: 61 IN | RESPIRATION RATE: 16 BRPM | SYSTOLIC BLOOD PRESSURE: 140 MMHG | DIASTOLIC BLOOD PRESSURE: 70 MMHG

## 2018-01-19 DIAGNOSIS — R53.82 CHRONIC FATIGUE: ICD-10-CM

## 2018-01-19 DIAGNOSIS — E78.2 MIXED HYPERLIPIDEMIA: ICD-10-CM

## 2018-01-19 DIAGNOSIS — I25.10 CORONARY ARTERY DISEASE INVOLVING NATIVE CORONARY ARTERY OF NATIVE HEART WITHOUT ANGINA PECTORIS: Primary | ICD-10-CM

## 2018-01-19 DIAGNOSIS — E66.01 OBESITY, MORBID (HCC): ICD-10-CM

## 2018-01-19 DIAGNOSIS — I10 HYPERTENSION, ESSENTIAL: ICD-10-CM

## 2018-01-19 DIAGNOSIS — G47.33 OSA (OBSTRUCTIVE SLEEP APNEA): ICD-10-CM

## 2018-01-19 NOTE — MR AVS SNAPSHOT
[address] [facility] 
[phone] Patient: Maurisio Cox MRN: [id number] Visit Information Date & Time Provider Department Dept. Phone Encounter #  
 1/19/2018  2:20 PM Lyly Parish MD [facility] [phone] [id number] Follow-up Instructions Return in about 1 year (around 1/19/2019). Your Appointments 1/19/2018  2:20 PM  
ESTABLISHED PATIENT with Lyly Parish MD  
[facility] Appt Note: one year follow up; one year follow up r.s from 3/29  
 [address]  [address] [facility] [address]  
[address] [address] [phone]  
  
    
 1/29/2018 11:40 AM  
Any with Katie Servin MD  
[address] [facility] ([facility]) Appt Note: 1 year PAP f/up- bring machine; pt r/s  
 [address] [facility] [phone] [address] [place] [address], [address] [phone] Upcoming Health Maintenance Date Due  
 MEDICARE YEARLY EXAM 10/31/2018 GLAUCOMA SCREENING Q2Y 7/25/2019 DTaP/Tdap/Td series (2 - Td) 10/20/2026 Allergies as of 1/19/2018  Review Complete On: 1/19/2018 By: Lyly Parish MD  
  
 Severity Noted Reaction Type Reactions Bactrim [Sulfamethoprim Ds]  09/30/2011    Rash Ciprocinonide  09/30/2011    Other (comments) Pt states that she is not allergic to this medication. Did not tolerate Cipro (2017) - fatigue & aches Macrobid [Nitrofurantoin Monohyd/m-cryst]  09/30/2011    Unknown (comments) Flu like Naprosyn [Naproxen]  10/17/2011    Hives Nsaids (Non-steroidal Anti-inflammatory Drug)  09/30/2011    Hives Prozac [Fluoxetine]  10/17/2011    Hives Statins-hmg-coa Reductase Inhibitors  04/29/2015    Myalgia Did not tolerate simvastatin or atorvastatin  
 Sulfa (Sulfonamide Antibiotics)  02/28/2012   Topical Hives Vioxx [Rofecoxib]  09/30/2011    Hives GI Upset Zoloft [Sertraline]  09/30/2011    Hives Current Immunizations  Reviewed on 1/12/2018 Name Date Influenza High Dose Vaccine PF 10/30/2017, 10/26/2016, 10/13/2015, 11/1/2014, 11/11/2013 Influenza Vaccine Split 10/17/2011 Pneumococcal Conjugate (PCV-13) 10/13/2015 TD Vaccine 9/27/1996 Tdap 10/20/2016 ZZZ-RETIRED (DO NOT USE) Pneumococcal Vaccine (Unspecified Type) 9/27/2006 Zoster Vaccine, Live 11/15/2014 Not reviewed this visit You Were Diagnosed With   
  
 Codes Comments Coronary artery disease involving native coronary artery of native heart without angina pectoris    -  Primary ICD-10-CM: I25.10 ICD-9-CM: 414.01   
 MARY JANE (obstructive sleep apnea)     ICD-10-CM: G47.33 
ICD-9-CM: 327.23 Mixed hyperlipidemia     ICD-10-CM: E78.2 ICD-9-CM: 272.2 Obesity, morbid ([facility] [address]     ICD-10-CM: E66.01 
ICD-9-CM: 278.01 Chronic fatigue     ICD-10-CM: R53.82 
ICD-9-CM: 780.79 Hypertension, essential     ICD-10-CM: I10 
ICD-9-CM: 401.9 Vitals BP Pulse Resp Height(growth percentile) Weight(growth percentile) BMI  
 140/70 (BP 1 Location: Left arm, BP Patient Position: Sitting) 60 16 5' 0.75\" (1.543 m) 190 lb 12.8 oz (86.5 kg) 36.35 kg/m2 OB Status Smoking Status Postmenopausal Former Smoker Vitals History BMI and BSA Data Body Mass Index Body Surface Area  
 36.35 kg/m 2 1.93 m 2 Preferred Pharmacy Pharmacy Name Phone [address] [facility], [address] [place] [address] [address] Your Updated Medication List  
  
   
This list is accurate as of: 1/19/18  2:12 PM.  Always use your most recent med list. ALLEGRA 180 mg tablet Generic drug:  fexofenadine Take 90 mg by mouth daily.  1/2 tablet BID  
  
 aspirin 81 mg tablet Take 81 mg by mouth daily. bumetanide 1 mg tablet Commonly known as:  Freddie Ogden TAKE 1 TABLET BY MOUTH TWO  TIMES DAILY  
  
 busPIRone 5 mg tablet Commonly known as:  BUSPAR  
TAKE 1 TABLET BY MOUTH TWO  TIMES DAILY CINNAMON PO Take 1,000 mg by mouth two (2) times a day. clobetasol 0.05 % topical cream  
Commonly known as:  Iza Couch Apply  to affected area daily as needed for Skin Irritation or Itching. Comp. Stocking,Knee,Regular,Lrg Misc  
2 Each by Does Not Apply route daily. cranberry 500 mg capsule Take 1,000 mg by mouth as needed. ezetimibe 10 mg tablet Commonly known as:  Willena Gisele Take 1 Tab by mouth daily. famotidine 40 mg tablet Commonly known as:  PEPCID  
TAKE 1 TABLET BY MOUTH  DAILY  
  
 fluticasone 50 mcg/actuation nasal spray Commonly known as:  FLONASE  
USE 2 SPRAYS IN EACH  NOSTRIL EVERY DAY  
  
 nitroglycerin 0.4 mg SL tablet Commonly known as:  NITROSTAT  
1 Tab by SubLINGual route every five (5) minutes as needed for Chest Pain.  
  
 valsartan-hydroCHLOROthiazide 160-12.5 mg per tablet Commonly known as:  DIOVAN-HCT  
TAKE 1 TABLET BY MOUTH  DAILY Follow-up Instructions Return in about 1 year (around 1/19/2019). Introducing [address]! Yohannes Thomas introduces [email or web address] patient portal. Now you can access parts of your medical record, email your doctor's office, and request medication refills online. 1. In your internet browser, go to [email or web address] 2. Click on the First Time User? Click Here link in the Sign In box. You will see the New Member Sign Up page. 3. Enter your [email or web address] Access Code exactly as it appears below. You will not need to use this code after youve completed the sign-up process. If you do not sign up before the expiration date, you must request a new code. · [email or web address] Access Code: [email or web address] Expires: 1/28/2018  1:14 PM 
 
 4. Enter the last four digits of your Social Security Number (xxxx) and Date of Birth (mm/dd/yyyy) as indicated and click Submit. You will be taken to the next sign-up page. 5. Create a [employer] ID. This will be your [employer] login ID and cannot be changed, so think of one that is secure and easy to remember. 6. Create a [employer] password. You can change your password at any time. 7. Enter your Password Reset Question and Answer. This can be used at a later time if you forget your password. 8. Enter your e-mail address. You will receive e-mail notification when new information is available in [address] 9. Click Sign Up. You can now view and download portions of your medical record. 10. Click the Download Summary menu link to download a portable copy of your medical information. If you have questions, please visit the Frequently Asked Questions section of the [employer] website. Remember, [employer] is NOT to be used for urgent needs. For medical emergencies, dial 911. Now available from your iPhone and Android! Please provide this summary of care documentation to your next provider. Your primary care clinician is listed as Jeaneth Hawley. If you have any questions after today's visit, please call [phone].

## 2018-01-19 NOTE — PROGRESS NOTES
HISTORY OF PRESENT ILLNESS  Juli Alcantar is a [de-identified] y.o. female     SUMMARY:   Problem List  Date Reviewed: 1/19/2018          Codes Class Noted    Obesity, morbid ([address] ICD-10-CM: E66.01  ICD-9-CM: 278.01  10/30/2017        Right rotator cuff tear ICD-10-CM: M75.101  ICD-9-CM: 840.4  11/11/2015    Overview Signed 11/11/2015 10:38 AM by Wero Chao MD     Right shoulder             Pre-diabetes ICD-10-CM: R73.03  ICD-9-CM: 790.29  8/17/2015        Osteopenia ICD-10-CM: M85.80  ICD-9-CM: 733.90  7/30/2014        MARY JANE (obstructive sleep apnea) ICD-10-CM: D75.11  ICD-9-CM: 327.23  8/20/2012        ABIODUN (vulval intraepithelial neoplasia) I ICD-10-CM: N90.0  ICD-9-CM: 624.01  4/30/2012        CAD (coronary artery disease), native coronary artery ICD-10-CM: I25.10  ICD-9-CM: 414.01  2/29/2012    Overview Addendum 11/11/2015  5:54 AM by Wero Chao MD     Statin intolerant  Cardiac Cath 2/28/12 CORONARY CIRCULATION:  A drug-eluting stent was performed on the 90 % lesion in the mid LAD. Following intervention there was a 0 % residual stenosis. -- A  drug-eluting stent was placed across the lesion and successfully deployed  at a maximum inflation pressure of 14 suzanne. Left main: Normal. Mid LAD: There was a tubular 90 %  type B stenosis in the proximal third of the vessel segment, just before  D1. There was LISA grade 3 flow through the vessel (brisk flow). Circumflex: The vessel was medium sized. Angiography showed minor luminal  irregularities. RCA: The vessel was small to medium sized. Angiography  showed minor luminal irregularities.                  Hyperlipidemia ICD-10-CM: E78.5  ICD-9-CM: 272.4  2/23/2012    Overview Signed 8/11/2015 10:57 AM by Wero Chao MD     Intolerant to statins & fenofibrate (irritability)             Hypertension, essential ICD-10-CM: I10  ICD-9-CM: 401.9  9/30/2011        Gout ICD-10-CM: M10.9  ICD-9-CM: 274.9  9/30/2011        Generalized osteoarthritis ICD-10-CM: M15.9  ICD-9-CM: 715.00  9/30/2011        Anxiety ICD-10-CM: F41.9  ICD-9-CM: 300.00  9/30/2011        GERD (gastroesophageal reflux disease) ICD-10-CM: K21.9  ICD-9-CM: 530.81  9/30/2011              Current Outpatient Prescriptions on File Prior to Visit   Medication Sig    busPIRone (BUSPAR) 5 mg tablet TAKE 1 TABLET BY MOUTH TWO  TIMES DAILY    valsartan-hydroCHLOROthiazide (DIOVAN-HCT) 160-12.5 mg per tablet TAKE 1 TABLET BY MOUTH  DAILY    famotidine (PEPCID) 40 mg tablet TAKE 1 TABLET BY MOUTH  DAILY    bumetanide (BUMEX) 1 mg tablet TAKE 1 TABLET BY MOUTH TWO  TIMES DAILY    fluticasone (FLONASE) 50 mcg/actuation nasal spray USE 2 SPRAYS IN EACH  NOSTRIL EVERY DAY    ezetimibe (ZETIA) 10 mg tablet Take 1 Tab by mouth daily.  clobetasol (TEMOVATE) 0.05 % topical cream Apply  to affected area daily as needed for Skin Irritation or Itching.  nitroglycerin (NITROSTAT) 0.4 mg SL tablet 1 Tab by SubLINGual route every five (5) minutes as needed for Chest Pain.  CINNAMON BARK (CINNAMON PO) Take 1,000 mg by mouth two (2) times a day.  cranberry 500 mg capsule Take 1,000 mg by mouth as needed.  fexofenadine (ALLEGRA) 180 mg tablet Take 90 mg by mouth daily. 1/2 tablet BID    Comp. Stocking,Knee,Regular,Lrg Misc 2 Each by Does Not Apply route daily.  aspirin 81 mg tablet Take 81 mg by mouth daily. No current facility-administered medications on file prior to visit. CARDIOLOGY STUDIES TO DATE:  7/11 normal stress cardiolyte lvef 75%      Chief Complaint   Patient presents with    Coronary Artery Disease     HPI :  Ms. Marlys House is doing okay, though she continues to complain of lots and lots of generalized fatigue. I looked back through all of the notes and she has been complaining of this for at least three years, perhaps it has gotten worsen, perhaps not.   She is not exercising as much as she should and she has a new CPAP mask and I have always been suspicious that her CPAP is not working effectively because she frequently falls asleep sitting in a chair during the day. Minimal shortness of breath. Some edema and very atypical nonexertional chest pain. She had exertional jaw and chest pain when she had her stent in 2012. CARDIAC ROS:   negative for palpitations, syncope, orthopnea, paroxysmal nocturnal dyspnea, exertional chest pressure/discomfort, claudication    Family History   Problem Relation Age of Onset    Coronary Artery Disease Mother     Hypertension Mother     Heart Failure Mother      CHF    Diabetes Father     Heart Disease Father     Hypertension Sister     Heart Disease Sister     Stroke Sister     Heart Surgery Sister     Heart Disease Brother     Diabetes Brother     No Known Problems Daughter     No Known Problems Daughter     No Known Problems Daughter     No Known Problems Son     Breast Cancer Paternal Grandmother        Past Medical History:   Diagnosis Date    Arthritis     CAD (coronary artery disease), native coronary artery 2/29/2012    CHI (closed head injury) 11/3/2013    [facility]    Chronic anxiety     Chronic fatigue     GERD (gastroesophageal reflux disease)     Gout     Hx of colonic polyps     Hypercholesterolemia     Hypertension     Obstructive sleep apnea (adult) (pediatric) 8/20/2012    Pain in left ankle 4/29/2015    Saw ortho (4/15) - dx w/ rupture L tibialis anterior tendon     Plantar fasciitis     ABIODUN (vulval intraepithelial neoplasia) I 4/30/2012       GENERAL ROS:  A comprehensive review of systems was negative except for that written in the HPI.     Visit Vitals    /70 (BP 1 Location: Left arm, BP Patient Position: Sitting)    Pulse 60    Resp 16    Ht 5' 0.75\" (1.543 m)    Wt 190 lb 12.8 oz (86.5 kg)    BMI 36.35 kg/m2       Wt Readings from Last 3 Encounters:   01/19/18 190 lb 12.8 oz (86.5 kg)   01/12/18 193 lb (87.5 kg)   12/28/17 192 lb (87.1 kg)            BP Readings from Last 3 Encounters:   01/19/18 140/70   01/12/18 132/78   12/28/17 124/66       PHYSICAL EXAM  General appearance: alert, cooperative, no distress, appears stated age  Neck: supple, symmetrical, trachea midline, no adenopathy, no carotid bruit and no JVD  Lungs: clear to auscultation bilaterally  Heart: regular rate and rhythm, S1, S2 normal, no murmur, click, rub or gallop  Extremities: edema tr    Lab Results   Component Value Date/Time    Cholesterol, total 205 05/03/2017 09:32 AM    Cholesterol, total 220 08/15/2015 09:50 AM    Cholesterol, total 232 03/23/2015 11:50 AM    Cholesterol, total 239 07/31/2014 10:14 AM    Cholesterol, total 177 01/10/2014 08:23 AM    HDL Cholesterol 54 05/03/2017 09:32 AM    HDL Cholesterol 47 08/15/2015 09:50 AM    HDL Cholesterol 54 03/23/2015 11:50 AM    HDL Cholesterol 55 07/31/2014 10:14 AM    HDL Cholesterol 59 01/10/2014 08:23 AM    LDL, calculated 121 05/03/2017 09:32 AM    LDL, calculated 133 08/15/2015 09:50 AM    LDL, calculated 145 03/23/2015 11:50 AM    LDL, calculated 143 07/31/2014 10:14 AM    LDL, calculated 82 01/10/2014 08:23 AM    Triglyceride 151 05/03/2017 09:32 AM    Triglyceride 201 08/15/2015 09:50 AM    Triglyceride 166 03/23/2015 11:50 AM    Triglyceride 206 07/31/2014 10:14 AM    Triglyceride 178 01/10/2014 08:23 AM    CHOL/HDL Ratio 4.2 03/09/2010 11:53 AM     ASSESSMENT  Ms. Lencho Faith complaints are longstanding, may be a little worse lately, though I am not convinced of that and I do not see how this could represent an acute coronary syndrome or progressive angina at this point given her history. I tried to reassure her in that regard and I strongly urged her to consider getting another sleep study on CPAP to see if it is really effective.            current treatment plan is effective, no change in therapy  lab results and schedule of future lab studies reviewed with patient  reviewed diet, exercise and weight control    Encounter Diagnoses Name Primary?  Coronary artery disease involving native coronary artery of native heart without angina pectoris Yes    MARY JANE (obstructive sleep apnea)     Mixed hyperlipidemia     Obesity, morbid (HCC)     Chronic fatigue     Hypertension, essential      No orders of the defined types were placed in this encounter. Follow-up Disposition:  Return in about 1 year (around 1/19/2019).     Vidya Rivera MD  1/19/2018

## 2018-01-22 ENCOUNTER — TELEPHONE (OUTPATIENT)
Dept: INTERNAL MEDICINE CLINIC | Age: 83
End: 2018-01-22

## 2018-01-22 DIAGNOSIS — R42 VERTIGO: Primary | ICD-10-CM

## 2018-01-22 NOTE — TELEPHONE ENCOUNTER
[phone] pt says that she has had another spell this moring with her head spinning and is requesting a call back from dr Cherylyn Runner only to let her know what she should do.

## 2018-01-23 NOTE — TELEPHONE ENCOUNTER
Called pt. #1- fatigue: reviewed cardiology notes. Favor fatigue is due to MARY JANE and not cardiac. She will d/w sleep specialist next week at f/u. #2- dizziness: has been on/off, had another episode last night, lasted for a few hrs then resolved. Reviewed symptoms. Reviewed w/u. Will order MRI and have her f/u with ENT.

## 2018-01-29 ENCOUNTER — DOCUMENTATION ONLY (OUTPATIENT)
Dept: SLEEP MEDICINE | Age: 83
End: 2018-01-29

## 2018-01-29 ENCOUNTER — OFFICE VISIT (OUTPATIENT)
Dept: SLEEP MEDICINE | Age: 83
End: 2018-01-29

## 2018-01-29 VITALS
HEART RATE: 60 BPM | DIASTOLIC BLOOD PRESSURE: 63 MMHG | HEIGHT: 61 IN | OXYGEN SATURATION: 100 % | SYSTOLIC BLOOD PRESSURE: 123 MMHG | WEIGHT: 193 LBS | BODY MASS INDEX: 36.44 KG/M2 | TEMPERATURE: 97.3 F

## 2018-01-29 DIAGNOSIS — G47.33 OBSTRUCTIVE SLEEP APNEA SYNDROME: Primary | ICD-10-CM

## 2018-01-29 DIAGNOSIS — I10 HYPERTENSION, ESSENTIAL: ICD-10-CM

## 2018-01-29 NOTE — PROGRESS NOTES
[address] [place]. [place], [address]  Tel.  [phone]  Fax. 100 [facility]  [place], [address]  Tel.  [phone]  Fax. [phone] [address]  Tel.  [phone]  Fax. [phone]     Alex Delgado is a [de-identified] y.o. female seen for a positive airway pressure follow-up. She reports no problems using the device. The following problems are identified:    Drowsiness no Problems exhaling no   Snoring no Forget to put on no   Mask Comfortable No, mask strap bothering her neck, interested in nasal mask Can't fall asleep no   Dry Mouth no Mask falls off no   Air Leaking yes Frequent awakenings Yes, mask bothering her     Download reviewed. She admits that her sleep has improved. Therapy Apnea Index averaged over PAP use: 3 /hr which reflects improved sleep breathing condition. Allergies   Allergen Reactions    Bactrim [Sulfamethoprim Ds] Rash    Ciprocinonide Other (comments)     Pt states that she is not allergic to this medication. Did not tolerate Cipro (2017) - fatigue & aches    Macrobid [Nitrofurantoin Monohyd/M-Cryst] Unknown (comments)     Flu like      Naprosyn [Naproxen] Hives    Nsaids (Non-Steroidal Anti-Inflammatory Drug) Hives    Prozac [Fluoxetine] Hives    Statins-Hmg-Coa Reductase Inhibitors Myalgia     Did not tolerate simvastatin or atorvastatin    Sulfa (Sulfonamide Antibiotics) Hives    Vioxx [Rofecoxib] Hives     GI Upset    Zoloft [Sertraline] Hives       She has a current medication list which includes the following prescription(s): buspirone, valsartan-hydrochlorothiazide, famotidine, bumetanide, fluticasone, ezetimibe, clobetasol, nitroglycerin, cinnamon bark, cranberry, fexofenadine, comp.stocking,knee,regular,lrg, and aspirin. .      She  has a past medical history of Arthritis; CAD (coronary artery disease), native coronary artery (2/29/2012); CHI (closed head injury) (11/3/2013); Chronic anxiety; Chronic fatigue; GERD (gastroesophageal reflux disease); Gout; colonic polyps; Hypercholesterolemia; Hypertension; Obstructive sleep apnea (adult) (pediatric) (8/20/2012); Pain in left ankle (4/29/2015); Plantar fasciitis; and ABIODUN (vulval intraepithelial neoplasia) I (4/30/2012). [place] Sleepiness Score: 6   and Modified F.O.S.Q. Score Total / 2: 17       O>    Visit Vitals    /63    Pulse 60    Temp 97.3 °F (36.3 °C) (Oral)    Ht 5' 0.75\" (1.543 m)    Wt 193 lb (87.5 kg)    SpO2 100%    BMI 36.77 kg/m2           General:   Alert, oriented, not in distress   Neck:   No JVD    Chest/Lungs:  symetrical lung expansion , no accessory muscle use    Extremities:  no obvious rashes , negative edema    Neuro:  No focal deficits ; No obvious tremor    Psych:  Normal affect ,  Normal countenance ;         A>    ICD-10-CM ICD-9-CM    1. Obstructive sleep apnea syndrome G47.33 327.23 AMB SUPPLY ORDER   2. Hypertension, essential I10 401.9       On Bi - Level :  16/12 cmH2O. Compliant:      yes    Therapeutic Response:  Positive    P>      * Follow-up Disposition: Not on File  * Mask evaluation done in the office - see tech notes. PAP therapy is effective and remains necessary for treatment of sleep apnea  Replacement machine ordered  she will continue on her current pressure settings. I have reviewed medicare requirements regarding PAP usage    * She was asked to contact our office for any problems regarding PAP therapy. * Counseling was provided regarding the importance of regular PAP use and on proper sleep hygiene and safe driving. * Re-enforced proper and regular cleaning for the device. 2. Hypertension - she continues on her current regimen. I have reviewed the relationship between hypertension as it relates to sleep-disordered breathing.    Rosetta Fitzgerald MD  Diplomate in Sleep Medicine  [facility]

## 2018-01-29 NOTE — PATIENT INSTRUCTIONS
[address] [facility] [place]. [place], [address]  Tel.  [phone]  Fax. 100 [facility] 60  [place], [address]  Tel.  [phone]  Fax. [phone] [address]  Tel.  [phone]  Fax. [phone]     PROPER SLEEP HYGIENE    What to avoid  · Do not have drinks with caffeine, such as coffee or black tea, for 8 hours before bed. · Do not smoke or use other types of tobacco near bedtime. Nicotine is a stimulant and can keep you awake. · Avoid drinking alcohol late in the evening, because it can cause you to wake in the middle of the night. · Do not eat a big meal close to bedtime. If you are hungry, eat a light snack. · Do not drink a lot of water close to bedtime, because the need to urinate may wake you up during the night. · Do not read or watch TV in bed. Use the bed only for sleeping and sexual activity. What to try  · Go to bed at the same time every night, and wake up at the same time every morning. Do not take naps during the day. · Keep your bedroom quiet, dark, and cool. · Get regular exercise, but not within 3 to 4 hours of your bedtime. .  · Sleep on a comfortable pillow and mattress. · If watching the clock makes you anxious, turn it facing away from you so you cannot see the time. · If you worry when you lie down, start a worry book. Well before bedtime, write down your worries, and then set the book and your concerns aside. · Try meditation or other relaxation techniques before you go to bed. · If you cannot fall asleep, get up and go to another room until you feel sleepy. Do something relaxing. Repeat your bedtime routine before you go to bed again. · Make your house quiet and calm about an hour before bedtime. Turn down the lights, turn off the TV, log off the computer, and turn down the volume on music. This can help you relax after a busy day.     Drowsy Driving  The [address] [place] [address] Road Traffic Safety Administration cites drowsiness as a causing factor in more than 894,014 police reported crashes annually, resulting in 76,000 injuries and 1,500 deaths. Other surveys suggest 55% of people polled have driven while drowsy in the past year, 23% had fallen asleep but not crashed, 3% crashed, and 2% had and accident due to drowsy driving. Who is at risk? Young Drivers: One study of drowsy driving accidents states that 55% of the drivers were under 25 years. Of those, 75% were male. Shift Workers and Travelers: People who work overnight or travel across time zones frequently are at higher risk of experiencing Circadian Rhythm Disorders. They are trying to work and function when their body is programed to sleep. Sleep Deprived: Lack of sleep has a serious impact on your ability to pay attention or focus on a task. Consistently getting less than the average of 8 hours your body needs creates partial or cumulative sleep deprivation. Untreated Sleep Disorders: Sleep Apnea, Narcolepsy, R.L.S., and other sleep disorders (untreated) prevent a person from getting enough restful sleep. This leads to excessive daytime sleepiness and increases the risk for drowsy driving accidents by up to 7 times. Medications / Alcohol: Even over the counter medications can cause drowsiness. Medications that impair a drivers attention should have a warning label. Alcohol naturally makes you sleepy and on its own can cause accidents. Combined with excessive drowsiness its effects are amplified. Signs of Drowsy Driving:   * You don't remember driving the last few miles   * You may drift out of your yassine   * You are unable to focus and your thoughts wander   * You may yawn more often than normal   * You have difficulty keeping your eyes open / nodding off   * Missing traffic signs, speeding, or tailgating  Prevention-   Good sleep hygiene, lifestyle and behavioral choices have the most impact on drowsy driving.  There is no substitute for sleep and the average person requires 8 hours nightly. If you find yourself driving drowsy, stop and sleep. Consider the sleep hygiene tips provided during your visit as well. Medication Refill Policy: Refills for all medications require 1 week advance notice. Please have your pharmacy fax a refill request. We are unable to fax, or call in \"controled substance\" medications and you will need to pick these prescriptions up from our office. [employer] Activation    Thank you for requesting access to [employer]. Please follow the instructions below to securely access and download your online medical record. [employer] allows you to send messages to your doctor, view your test results, renew your prescriptions, schedule appointments, and more. How Do I Sign Up? 1. In your internet browser, go to [email or web address]. 2. Click on the First Time User? Click Here link in the Sign In box. You will see the New Member Sign Up page. 3. Enter your [employer] Access Code exactly as it appears below. You will not need to use this code after youve completed the sign-up process. If you do not sign up before the expiration date, you must request a new code. [employer] Access Code: [email or web address]  Expires: 2018 11:53 AM (This is the date your [employer] access code will )    4. Enter the last four digits of your Social Security Number (xxxx) and Date of Birth (mm/dd/yyyy) as indicated and click Submit. You will be taken to the next sign-up page. 5. Create a [employer] ID. This will be your [employer] login ID and cannot be changed, so think of one that is secure and easy to remember. 6. Create a [employer] password. You can change your password at any time. 7. Enter your Password Reset Question and Answer. This can be used at a later time if you forget your password. 8. Enter your e-mail address. You will receive e-mail notification when new information is available in [address] 9. Click Sign Up.  You can now view and download portions of your medical record. 10. Click the Download Summary menu link to download a portable copy of your medical information. Additional Information    If you have questions, please call [phone]. Remember, [employer] is NOT to be used for urgent needs. For medical emergencies, dial 911.

## 2018-02-09 ENCOUNTER — TELEPHONE (OUTPATIENT)
Dept: SLEEP MEDICINE | Age: 83
End: 2018-02-09

## 2018-02-09 DIAGNOSIS — G47.33 OBSTRUCTIVE SLEEP APNEA SYNDROME: Primary | ICD-10-CM

## 2018-02-09 NOTE — TELEPHONE ENCOUNTER
Have Tod call patient and inquire if machine is malfunctioning in any way, so she will qualify for new machine.

## 2018-02-09 NOTE — TELEPHONE ENCOUNTER
Marino Sanchez from [facility] called stating patient is not eligible for new bipap machine unless there is something wrong with current machine.

## 2018-02-09 NOTE — TELEPHONE ENCOUNTER
Patient was called to address her concerns. CPAP is 9years old and  blower is louder and is hindering her from  getting to sleep.

## 2018-02-12 NOTE — TELEPHONE ENCOUNTER
Machine getting louder  Replacement needed  Order PAP and call patient and let them know which DME company they should be hearing from. Schedule for first adherence visit in 6 weeks.

## 2018-02-15 ENCOUNTER — DOCUMENTATION ONLY (OUTPATIENT)
Dept: SLEEP MEDICINE | Age: 83
End: 2018-02-15

## 2018-02-15 NOTE — PROGRESS NOTES
[facility] faxed over a request for F2F and sleep study, information was faxed back on 02/15/2018.

## 2018-02-20 ENCOUNTER — HOSPITAL ENCOUNTER (OUTPATIENT)
Dept: MRI IMAGING | Age: 83
Discharge: HOME | End: 2018-02-20
Attending: INTERNAL MEDICINE
Payer: MEDICARE

## 2018-02-20 DIAGNOSIS — R42 VERTIGO: ICD-10-CM

## 2018-02-20 PROCEDURE — 70551 MRI BRAIN STEM W/O DYE: CPT

## 2018-02-20 NOTE — PROGRESS NOTES
Please call patient. MRI shows some chronic changes, but nothing acute (stroke or mass). She is on good regimen for BP & lipid control. For vertigo should f/u with ENT.

## 2018-02-21 NOTE — PROGRESS NOTES
Called pt and notified her that pt MRI showed some chronic changes but nothing serious as stroke or mass. Informed her also that she is on good medication control for her BP and lipid control. Also stated to pt to follow up with ENT and gave pt name of ENT Charisse Prieto.

## 2018-02-28 ENCOUNTER — OFFICE VISIT (OUTPATIENT)
Dept: INTERNAL MEDICINE CLINIC | Age: 83
End: 2018-02-28

## 2018-02-28 VITALS
OXYGEN SATURATION: 98 % | WEIGHT: 193 LBS | RESPIRATION RATE: 20 BRPM | HEIGHT: 61 IN | SYSTOLIC BLOOD PRESSURE: 130 MMHG | TEMPERATURE: 97.8 F | DIASTOLIC BLOOD PRESSURE: 66 MMHG | BODY MASS INDEX: 36.44 KG/M2 | HEART RATE: 70 BPM

## 2018-02-28 DIAGNOSIS — R14.0 ABDOMINAL BLOATING: ICD-10-CM

## 2018-02-28 DIAGNOSIS — R53.82 CHRONIC FATIGUE: Primary | ICD-10-CM

## 2018-02-28 NOTE — MR AVS SNAPSHOT
[address] [facility] [address] [facility] 
[phone] Patient: Leland Kay MRN: [id number]:[id number] Visit Information Date & Time Provider Department Dept. Phone Encounter #  
 2/28/2018  1:15 PM Navdeep Rasheed9 [facility] [phone] [id number] Your Appointments 1/21/2019  9:40 AM  
Any with Valerie Rodriguez MD  
[address] [facility] ([address] [place] [address]) Appt Note: 1 year f/up- bring machine [address] [phone] [address] [facility] [phone] Upcoming Health Maintenance Date Due  
 MEDICARE YEARLY EXAM 10/31/2018 GLAUCOMA SCREENING Q2Y 7/25/2019 DTaP/Tdap/Td series (2 - Td) 10/20/2026 Allergies as of 2/28/2018  Review Complete On: 2/28/2018 By: Faby Stoddard RN Severity Noted Reaction Type Reactions Bactrim [Sulfamethoprim Ds]  09/30/2011    Rash Ciprocinonide  09/30/2011    Other (comments) Pt states that she is not allergic to this medication. Did not tolerate Cipro (2017) - fatigue & aches Macrobid [Nitrofurantoin Monohyd/m-cryst]  09/30/2011    Unknown (comments) Flu like Naprosyn [Naproxen]  10/17/2011    Hives Nsaids (Non-steroidal Anti-inflammatory Drug)  09/30/2011    Hives Prozac [Fluoxetine]  10/17/2011    Hives Statins-hmg-coa Reductase Inhibitors  04/29/2015    Myalgia Did not tolerate simvastatin or atorvastatin  
 Sulfa (Sulfonamide Antibiotics)  02/28/2012   Topical Hives Vioxx [Rofecoxib]  09/30/2011    Hives GI Upset Zoloft [Sertraline]  09/30/2011    Hives Current Immunizations  Reviewed on 2/28/2018 Name Date Influenza High Dose Vaccine PF 10/30/2017, 10/26/2016, 10/13/2015, 11/1/2014, 11/11/2013 Influenza Vaccine Split 10/17/2011 Pneumococcal Conjugate (PCV-13) 10/13/2015 TD Vaccine 9/27/1996 Tdap 10/20/2016 ZZZ-RETIRED (DO NOT USE) Pneumococcal Vaccine (Unspecified Type) 9/27/2006 Zoster Vaccine, Live 11/15/2014 Reviewed by Debora Sanchez RN on 2/28/2018 at  1:20 PM  
You Were Diagnosed With   
  
 Codes Comments Chronic fatigue    -  Primary ICD-10-CM: R53.82 
ICD-9-CM: 780.79 Abdominal bloating     ICD-10-CM: R14.0 ICD-9-CM: 213. 3 Vitals BP Pulse Temp Resp Height(growth percentile) Weight(growth percentile) 130/66 70 97.8 °F (36.6 °C) (Oral) 20 5' 0.75\" (1.543 m) 193 lb (87.5 kg) SpO2 BMI OB Status Smoking Status 98% 36.77 kg/m2 Postmenopausal Former Smoker BMI and BSA Data Body Mass Index Body Surface Area  
 36.77 kg/m 2 1.94 m 2 Preferred Pharmacy Pharmacy Name Phone [address] [facility], [address] [place] [address] [address] Your Updated Medication List  
  
   
This list is accurate as of 2/28/18  1:42 PM.  Always use your most recent med list.  
  
  
  
  
 aspirin 81 mg tablet Take 81 mg by mouth daily. bumetanide 1 mg tablet Commonly known as:  [place] TAKE 1 TABLET BY MOUTH TWO  TIMES DAILY  
  
 busPIRone 5 mg tablet Commonly known as:  BUSPAR  
TAKE 1 TABLET BY MOUTH TWO  TIMES DAILY CINNAMON PO Take 1,000 mg by mouth two (2) times a day. clobetasol 0.05 % topical cream  
Commonly known as:  Marielena [place]ashore Apply  to affected area daily as needed for Skin Irritation or Itching. Comp. Stocking,Knee,Regular,Lrg Misc  
2 Each by Does Not Apply route daily. cranberry 500 mg capsule Take 1,000 mg by mouth as needed. famotidine 40 mg tablet Commonly known as:  PEPCID  
TAKE 1 TABLET BY MOUTH  DAILY  
  
 fluticasone 50 mcg/actuation nasal spray Commonly known as:  FLONASE  
USE 2 SPRAYS IN EACH  NOSTRIL EVERY DAY  
  
 nitroglycerin 0.4 mg SL tablet Commonly known as:  NITROSTAT 1 Tab by SubLINGual route every five (5) minutes as needed for Chest Pain.  
  
 valsartan-hydroCHLOROthiazide 160-12.5 mg per tablet Commonly known as:  DIOVAN-HCT  
TAKE 1 TABLET BY MOUTH  DAILY Patient Instructions Fatigue: Care Instructions Your Care Instructions Fatigue is a feeling of tiredness, exhaustion, or lack of energy. You may feel fatigue because of too much or not enough activity. It can also come from stress, lack of sleep, boredom, and poor diet. Many medical problems, such as viral infections, can cause fatigue. Emotional problems, especially depression, are often the cause of fatigue. Fatigue is most often a symptom of another problem. Treatment for fatigue depends on the cause. For example, if you have fatigue because you have a certain health problem, treating this problem also treats your fatigue. If depression or anxiety is the cause, treatment may help. Follow-up care is a key part of your treatment and safety. Be sure to make and go to all appointments, and call your doctor if you are having problems. It's also a good idea to know your test results and keep a list of the medicines you take. How can you care for yourself at home? · Get regular exercise. But don't overdo it. Go back and forth between rest and exercise. · Get plenty of rest. 
· Eat a healthy diet. Do not skip meals, especially breakfast. 
· Reduce your use of caffeine, tobacco, and alcohol. Caffeine is most often found in coffee, tea, cola drinks, and chocolate. · Limit medicines that can cause fatigue. This includes tranquilizers and cold and allergy medicines. When should you call for help? Watch closely for changes in your health, and be sure to contact your doctor if: 
? · You have new symptoms such as fever or a rash. ? · Your fatigue gets worse. ? · You have been feeling down, depressed, or hopeless. Or you may have lost interest in things that you usually enjoy. ? · You are not getting better as expected. Where can you learn more? Go to http://franny-shukri.info/. Enter O867 in the search box to learn more about \"Fatigue: Care Instructions. \" Current as of: March 20, 2017 Content Version: 11.4 © 1214-6727 [employer]. Care instructions adapted under license by [employer] (which disclaims liability or warranty for this information). If you have questions about a medical condition or this instruction, always ask your healthcare professional. [address] any warranty or liability for your use of this information. Introducing [facility]! [employer] introduces [employer] patient portal. Now you can access parts of your medical record, email your doctor's office, and request medication refills online. 1. In your internet browser, go to [email or web address] 2. Click on the First Time User? Click Here link in the Sign In box. You will see the New Member Sign Up page. 3. Enter your [employer] Access Code exactly as it appears below. You will not need to use this code after youve completed the sign-up process. If you do not sign up before the expiration date, you must request a new code. · [employer] Access Code: [email or web address] Expires: 4/29/2018 11:53 AM 
 
4. Enter the last four digits of your Social Security Number (xxxx) and Date of Birth (mm/dd/yyyy) as indicated and click Submit. You will be taken to the next sign-up page. 5. Create a [employer] ID. This will be your [employer] login ID and cannot be changed, so think of one that is secure and easy to remember. 6. Create a [employer] password. You can change your password at any time. 7. Enter your Password Reset Question and Answer. This can be used at a later time if you forget your password. 8. Enter your e-mail address. You will receive e-mail notification when new information is available in [address] 9. Click Sign Up. You can now view and download portions of your medical record. 10. Click the Download Summary menu link to download a portable copy of your medical information. If you have questions, please visit the Frequently Asked Questions section of the [employer] website. Remember, [employer] is NOT to be used for urgent needs. For medical emergencies, dial 911. Now available from your iPhone and Android! Please provide this summary of care documentation to your next provider. Your primary care clinician is listed as Shravan Plascencia. If you have any questions after today's visit, please call [phone].

## 2018-02-28 NOTE — PROGRESS NOTES
Mercedes Navarro is a [de-identified] y.o. female who was seen in clinic today (2/28/2018). Assessment & Plan:   Diagnoses and all orders for this visit:    1. Chronic fatigue- unchanged, reviewed differential dx, reassured her I was listening and this could be heart related but agree w/ cardiologist at this time it is not very likely. Will try stopping Allegra and Zetia to see if these have made it acutely worse. Did review previous MRI and that will need restart zetia if she notices no changes. Reviewed to give new CPAP a few more days/weeks to kick in before we can re-evaluate. Did also review deconditioning as this is a chronic issue. 2. Abdominal bloating- this is a new problem, symptoms are: fluctuating, differential dx reviewed with the patient, and exact etiology is unclear. Favor diet trigger. Reviewed to keep food diary and look for obvious triggers. No medication changes. Red flags were reviewed with the patient to RTC or notify me, expected time course for resolution reviewed. Follow-up Disposition:  Return if symptoms worsen or fail to improve. Subjective: Laya Hester was seen today for Fatigue    Fatigue  Patient complains of fatigue. Symptoms began years ago. Since last visit she has seen cardiologist and sleep specialist.  Cardiologist did not think there is a cardiac issue. Sleep specialist has adjusted her mask. She reports she is sleeping better. She still thinks this is heart related. Previous labs reviewed (1/12/18- all at baseline for her). Brain MRI (2/20/18- chronic small vessel ischemic changes). Prior to Admission medications    Medication Sig Start Date End Date Taking?  Authorizing Provider   busPIRone (BUSPAR) 5 mg tablet TAKE 1 TABLET BY MOUTH TWO  TIMES DAILY 1/1/18  Yes Alex Bruce MD   valsartan-hydroCHLOROthiazide (DIOVAN-HCT) 160-12.5 mg per tablet TAKE 1 TABLET BY MOUTH  DAILY 11/29/17  Yes Alex Bruce MD   famotidine (PEPCID) 40 mg tablet TAKE 1 TABLET BY MOUTH  DAILY 11/29/17  Yes Nnamdi Brito MD   bumetanide (BUMEX) 1 mg tablet TAKE 1 TABLET BY MOUTH TWO  TIMES DAILY 11/29/17  Yes Nnamdi Brito MD   fluticasone [facility]) 50 mcg/actuation nasal spray USE 2 SPRAYS IN EACH  NOSTRIL EVERY DAY 10/9/17  Yes Nnamdi Brito MD   ezetimibe (ZETIA) 10 mg tablet Take 1 Tab by mouth daily. 5/11/17  Yes Nnamdi Brito MD   clobetasol (TEMOVATE) 0.05 % topical cream Apply  to affected area daily as needed for Skin Irritation or Itching. 4/21/16  Yes Nnamdi Brito MD   nitroglycerin (NITROSTAT) 0.4 mg SL tablet 1 Tab by SubLINGual route every five (5) minutes as needed for Chest Pain. 5/18/15  Yes Nnamdi Brito MD   CINNAMON BARK (CINNAMON PO) Take 1,000 mg by mouth two (2) times a day. Yes Historical Provider   cranberry 500 mg capsule Take 1,000 mg by mouth as needed. Yes Historical Provider   fexofenadine (ALLEGRA) 180 mg tablet Take 90 mg by mouth daily. 1/2 tablet BID   Yes Historical Provider   Comp. Stocking,Knee,Regular,Lrg Misc 2 Each by Does Not Apply route daily. 6/10/13  Yes Nnamdi Brito MD   aspirin 81 mg tablet Take 81 mg by mouth daily. 9/30/11  Yes Historical Provider          Allergies   Allergen Reactions    Bactrim [Sulfamethoprim Ds] Rash    Ciprocinonide Other (comments)     Pt states that she is not allergic to this medication. Did not tolerate Cipro (2017) - fatigue & aches    Macrobid [Nitrofurantoin Monohyd/M-Cryst] Unknown (comments)     Flu like      Naprosyn [Naproxen] Hives    Nsaids (Non-Steroidal Anti-Inflammatory Drug) Hives    Prozac [Fluoxetine] Hives    Statins-Hmg-Coa Reductase Inhibitors Myalgia     Did not tolerate simvastatin or atorvastatin    Sulfa (Sulfonamide Antibiotics) Hives    Vioxx [Rofecoxib] Hives     GI Upset    Zoloft [Sertraline] Hives           Review of Systems   Constitutional: Positive for malaise/fatigue. Negative for weight loss. Respiratory: Negative for cough and shortness of breath. Cardiovascular: Negative for chest pain, palpitations and leg swelling. Gastrointestinal: Negative for abdominal pain, constipation, diarrhea, heartburn, nausea and vomiting. She reports bloating in evening   Skin: Negative for rash. Neurological: Negative for tingling and headaches. Psychiatric/Behavioral: Negative for depression. The patient does not have insomnia. Objective:   Physical Exam   Constitutional: No distress. obese   Eyes: Conjunctivae are normal. No scleral icterus. Cardiovascular: Regular rhythm and normal heart sounds. No murmur heard. Pulmonary/Chest: Effort normal and breath sounds normal. She has no wheezes. She has no rales. Musculoskeletal: She exhibits no edema. Psychiatric: She has a normal mood and affect. Visit Vitals    /66    Pulse 70    Temp 97.8 °F (36.6 °C) (Oral)    Resp 20    Ht 5' 0.75\" (1.543 m)    Wt 193 lb (87.5 kg)    SpO2 98%    BMI 36.77 kg/m2         Disclaimer:  Advised her to call back or return to office if symptoms worsen/change/persist.  Discussed expected course/resolution/complications of diagnosis in detail with patient. Medication risks/benefits/costs/interactions/alternatives discussed with patient. She was given an after visit summary which includes diagnoses, current medications, & vitals. She expressed understanding with the diagnosis and plan. Aspects of this note may have been generated using voice recognition software. Despite editing, there may be some syntax errors.        Wero Chao MD

## 2018-02-28 NOTE — PATIENT INSTRUCTIONS
Fatigue: Care Instructions  Your Care Instructions    Fatigue is a feeling of tiredness, exhaustion, or lack of energy. You may feel fatigue because of too much or not enough activity. It can also come from stress, lack of sleep, boredom, and poor diet. Many medical problems, such as viral infections, can cause fatigue. Emotional problems, especially depression, are often the cause of fatigue. Fatigue is most often a symptom of another problem. Treatment for fatigue depends on the cause. For example, if you have fatigue because you have a certain health problem, treating this problem also treats your fatigue. If depression or anxiety is the cause, treatment may help. Follow-up care is a key part of your treatment and safety. Be sure to make and go to all appointments, and call your doctor if you are having problems. It's also a good idea to know your test results and keep a list of the medicines you take. How can you care for yourself at home? · Get regular exercise. But don't overdo it. Go back and forth between rest and exercise. · Get plenty of rest.  · Eat a healthy diet. Do not skip meals, especially breakfast.  · Reduce your use of caffeine, tobacco, and alcohol. Caffeine is most often found in coffee, tea, cola drinks, and chocolate. · Limit medicines that can cause fatigue. This includes tranquilizers and cold and allergy medicines. When should you call for help? Watch closely for changes in your health, and be sure to contact your doctor if:  ? · You have new symptoms such as fever or a rash. ? · Your fatigue gets worse. ? · You have been feeling down, depressed, or hopeless. Or you may have lost interest in things that you usually enjoy. ? · You are not getting better as expected. Where can you learn more? Go to http://franny-shukri.info/. Enter K554 in the search box to learn more about \"Fatigue: Care Instructions. \"  Current as of: March 20, 2017  Content Version: 11.4  © 2322-9676 [employer], Incorporated. Care instructions adapted under license by [employer] (which disclaims liability or warranty for this information). If you have questions about a medical condition or this instruction, always ask your healthcare professional. [address] any warranty or liability for your use of this information.

## 2018-04-11 ENCOUNTER — TELEPHONE (OUTPATIENT)
Dept: INTERNAL MEDICINE CLINIC | Age: 83
End: 2018-04-11

## 2018-04-11 NOTE — TELEPHONE ENCOUNTER
Patient has misplaced the name of the doctor that was recommended for dizziness a couple of months ago. She thinks it might be Dr. Nereyda Lacy, but is not sure -- please let her know.

## 2018-05-10 ENCOUNTER — OFFICE VISIT (OUTPATIENT)
Dept: GYNECOLOGY | Age: 83
End: 2018-05-10

## 2018-05-10 ENCOUNTER — HOSPITAL ENCOUNTER (OUTPATIENT)
Dept: LAB | Age: 83
Discharge: HOME | End: 2018-05-10
Payer: MEDICARE

## 2018-05-10 VITALS
HEART RATE: 64 BPM | HEIGHT: 61 IN | DIASTOLIC BLOOD PRESSURE: 62 MMHG | BODY MASS INDEX: 36.63 KG/M2 | SYSTOLIC BLOOD PRESSURE: 142 MMHG | WEIGHT: 194 LBS

## 2018-05-10 DIAGNOSIS — Z91.89 GYN EXAM FOR HIGH-RISK MEDICARE PATIENT: Primary | ICD-10-CM

## 2018-05-10 DIAGNOSIS — L90.0 LICHEN SCLEROSUS: ICD-10-CM

## 2018-05-10 PROCEDURE — 88142 CYTOPATH C/V THIN LAYER: CPT | Performed by: OBSTETRICS & GYNECOLOGY

## 2018-05-10 NOTE — PROGRESS NOTES
[address] [place] [address] [address]ias [address], [address] [facility]  [phone]  MD Montana Hutchison MD    Patient ID:  Kati Ward  [id number]  1935/82 y.o. Visit date: 5/10/2018    INTERVAL HISTORY: Kati Ward is a  female with a history of ABIODUN. The patient has noted a prior history of pruritis vulvitis and inframammary dermatitis. The patient presents for ongoing evaluation at a 14 month interval.    Last Cytology: 3/2017,  5/15/2014   Negative    Ongoing cardiology followup. 5/10/2018: The patient is seen at a fourteen month inteval.   Mammogram current  Active, restricted by recent ankle injury. Negative  and GI review. Negative cardiopulmonary review. Patient denies any abnormal bleeding or vaginal discharge. Weight stable. OB/GYN ROS: Denies, dysuria, hematuria, urinary incontinence, vaginal discharge, abnormal vaginal bleeding, pelvic pain    Past Medical History:   Diagnosis Date    Arthritis     CAD (coronary artery disease), native coronary artery 2/29/2012    CHI (closed head injury) 11/3/2013    seen in ED [facility] ED after fall    Chronic anxiety     Chronic fatigue     GERD (gastroesophageal reflux disease)     Gout     Hx of colonic polyps     Hypercholesterolemia     Hypertension     Obstructive sleep apnea (adult) (pediatric) 8/20/2012    Pain in left ankle 4/29/2015    Saw ortho (4/15) - dx w/ rupture L tibialis anterior tendon     Plantar fasciitis     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  HX HEART CATHETERIZATION  2/12    s/p stent    HX HEENT  3/2002    jarod. laser eye surg.     HX MOHS PROCEDURES Right 4/28/04    HX ORTHOPAEDIC      Right TKR ([facility])    HX ORTHOPAEDIC      Right wrist cyst    HX OTHER SURGICAL  5/15/14    PAP- normal    HX TONSIL AND ADENOIDECTOMY         Social History     Social History    Marital status:      Spouse name: N/A    Number of children: N/A    Years of education: N/A     Occupational History    Not on file. Social History Main Topics    Smoking status: Former Smoker     Packs/day: 0.30     Years: 5.00     Quit date: 7/2/1970    Smokeless tobacco: Never Used    Alcohol use No    Drug use: No    Sexual activity: No     Other Topics Concern    Not on file     Social History Narrative       Family History   Problem Relation Age of Onset    Coronary Artery Disease Mother     Hypertension Mother     Heart Failure Mother      CHF    Diabetes Father     Heart Disease Father     Hypertension Sister     Heart Disease Sister     Stroke Sister     Heart Surgery Sister     Heart Disease Brother     Diabetes Brother     No Known Problems Daughter     No Known Problems Daughter     No Known Problems Daughter     No Known Problems Son     Breast Cancer Paternal Grandmother        Current Outpatient Prescriptions on File Prior to Visit   Medication Sig Dispense Refill    busPIRone (BUSPAR) 5 mg tablet TAKE 1 TABLET BY MOUTH TWO  TIMES DAILY 180 Tab 1    valsartan-hydroCHLOROthiazide (DIOVAN-HCT) 160-12.5 mg per tablet TAKE 1 TABLET BY MOUTH  DAILY 90 Tab 1    famotidine (PEPCID) 40 mg tablet TAKE 1 TABLET BY MOUTH  DAILY 90 Tab 1    bumetanide (BUMEX) 1 mg tablet TAKE 1 TABLET BY MOUTH TWO  TIMES DAILY 180 Tab 1    clobetasol (TEMOVATE) 0.05 % topical cream Apply  to affected area daily as needed for Skin Irritation or Itching. 60 g 2    CINNAMON BARK (CINNAMON PO) Take 1,000 mg by mouth two (2) times a day.  cranberry 500 mg capsule Take 1,000 mg by mouth as needed.  Comp. Stocking,Knee,Regular,Lrg Misc 2 Each by Does Not Apply route daily. 2 Each 1    aspirin 81 mg tablet Take 81 mg by mouth daily.       fluticasone (FLONASE) 50 mcg/actuation nasal spray USE 2 SPRAYS IN [facility] NOSTRIL EVERY DAY 48 g 1    nitroglycerin (NITROSTAT) 0.4 mg SL tablet 1 Tab by SubLINGual route every five (5) minutes as needed for Chest Pain. 1 Bottle 0     No current facility-administered medications on file prior to visit. Allergies   Allergen Reactions    Bactrim [Sulfamethoprim Ds] Rash    Ciprocinonide Other (comments)     Pt states that she is not allergic to this medication. Did not tolerate Cipro (2017) - fatigue & aches    Macrobid [Nitrofurantoin Monohyd/M-Cryst] Unknown (comments)     Flu like      Naprosyn [Naproxen] Hives    Nsaids (Non-Steroidal Anti-Inflammatory Drug) Hives    Prozac [Fluoxetine] Hives    Statins-Hmg-Coa Reductase Inhibitors Myalgia     Did not tolerate simvastatin or atorvastatin    Sulfa (Sulfonamide Antibiotics) Hives    Vioxx [Rofecoxib] Hives     GI Upset    Zoloft [Sertraline] Hives       ROS:  Negative than HPI  Active, no restrictions. Negative  and GI review. Negative cardiopulmonary review. Patient denies any abnormal bleeding or vaginal discharge. Weight stable. OBJECTIVE:  PHYSICAL EXAM  VITAL SIGNS: Visit Vitals    /62 (BP 1 Location: Left arm, BP Patient Position: Sitting)    Pulse 64    Ht 5' 0.75\" (1.543 m)    Wt 194 lb (88 kg)    BMI 36.96 kg/m2      GENERAL NAEL: in no apparent distress   HEENT: within normal limits, nodes negative, no thyroid nodularity. RESPIRATORY: lungs clear to auscultation and percussion   CARDIOVASC Regular rate and rhythm or S1S2 present     GASTROINT: soft, non-tender, without masses or organomegaly   MUSCULOSKEL: no joint tenderness, deformity or swelling   INTEGUMENT: Scattered red isolated lesion under the right breast, no ulcers. Nontender. No changes   EXTREMITIES: extremities normal, atraumatic, no cyanosis or edema   PELVIC: External genitalia: No gross lesion.  No active inflamatory vulvitis, BUS negative  Vaginal: atrophic mucosa, cytology taken, no suspicious masses, induration or nodularity  Adnexa: normal bimanual exam and non palpable, PSW clear   RECTAL: normal rectal, no masses   MALIKA SURVEY: Cervical, supraclavicular, and axillary nodes normal.   NEURO: Grossly normal     DATE REVIEW as available:  Lab Results   Component Value Date/Time    WBC 9.0 01/12/2018 01:51 PM    HGB 11.3 01/12/2018 01:51 PM    HCT 34.7 01/12/2018 01:51 PM    PLATELET 563 60/63/6285 01:51 PM    MCV 89 01/12/2018 01:51 PM     Lab Results   Component Value Date/Time    Sodium 141 01/12/2018 01:51 PM    Potassium 3.5 01/12/2018 01:51 PM    Chloride 96 01/12/2018 01:51 PM    CO2 31 (H) 01/12/2018 01:51 PM    Anion gap 10 02/04/2017 01:54 PM    Glucose 87 01/12/2018 01:51 PM    BUN 17 01/12/2018 01:51 PM    Creatinine 0.88 01/12/2018 01:51 PM    BUN/Creatinine ratio 19 01/12/2018 01:51 PM    GFR est AA 72 01/12/2018 01:51 PM    GFR est non-AA 62 01/12/2018 01:51 PM    Calcium 10.2 01/12/2018 01:51 PM       IMPRESSION AND PLAN:    Javier Santiago has a working diagnosis of ABIODUN, JAKE   Cytology taken    Return twelve months or PRN    All questions answered.       Nadir Armando MD  5/10/2018/11:49 AM

## 2018-05-10 NOTE — PROGRESS NOTES
one year check up, pt reports no abnormal spotting or bleeding, pt states she has no questions or concerns for today's visit, pt complains of fatigue, Initial blood pressure reading 128/95, repeat blood pressure reading 142/62

## 2018-05-10 NOTE — PATIENT INSTRUCTIONS
[employer] Activation    Thank you for requesting access to [employer]. Please follow the instructions below to securely access and download your online medical record. [employer] allows you to send messages to your doctor, view your test results, renew your prescriptions, schedule appointments, and more. How Do I Sign Up? 1. In your internet browser, go to [email or web address]. 2. Click on the First Time User? Click Here link in the Sign In box. You will see the New Member Sign Up page. 3. Enter your [employer] Access Code exactly as it appears below. You will not need to use this code after youve completed the sign-up process. If you do not sign up before the expiration date, you must request a new code. [employer] Access Code: [email or web address]  Expires: 2018 11:23 AM (This is the date your [employer] access code will )    4. Enter the last four digits of your Social Security Number (xxxx) and Date of Birth (mm/dd/yyyy) as indicated and click Submit. You will be taken to the next sign-up page. 5. Create a [employer] ID. This will be your [employer] login ID and cannot be changed, so think of one that is secure and easy to remember. 6. Create a [employer] password. You can change your password at any time. 7. Enter your Password Reset Question and Answer. This can be used at a later time if you forget your password. 8. Enter your e-mail address. You will receive e-mail notification when new information is available in [address]. 9. Click Sign Up. You can now view and download portions of your medical record. 10. Click the Download Summary menu link to download a portable copy of your medical information. Additional Information    If you have questions, please visit the Frequently Asked Questions section of the [employer] website at [email or web address] Remember, [employer] is NOT to be used for urgent needs. For medical emergencies, dial 911.

## 2018-05-22 NOTE — PROGRESS NOTES
Patient:   May Angulo  SSN: [id number]  : 1935    Date:    2018    Ms. Jordan Rizzo's cytology/Pap smear has been interpreted as within normal limts. I would ask that subsequent Pap smears be performed at the interval discussed at the last office visit.     If there are any questions please do not hesitate to contact our offices [phone] Sudheer Reveles MD

## 2018-05-27 ENCOUNTER — OP HISTORICAL/CONVERTED ENCOUNTER (OUTPATIENT)
Dept: OTHER | Age: 83
End: 2018-05-27

## 2018-05-30 ENCOUNTER — OFFICE VISIT (OUTPATIENT)
Dept: INTERNAL MEDICINE CLINIC | Age: 83
End: 2018-05-30

## 2018-05-30 VITALS
HEIGHT: 61 IN | RESPIRATION RATE: 18 BRPM | TEMPERATURE: 97.9 F | WEIGHT: 192 LBS | HEART RATE: 68 BPM | OXYGEN SATURATION: 98 % | SYSTOLIC BLOOD PRESSURE: 128 MMHG | DIASTOLIC BLOOD PRESSURE: 66 MMHG | BODY MASS INDEX: 36.25 KG/M2

## 2018-05-30 DIAGNOSIS — E66.01 OBESITY, MORBID (HCC): ICD-10-CM

## 2018-05-30 DIAGNOSIS — K21.9 GASTROESOPHAGEAL REFLUX DISEASE WITHOUT ESOPHAGITIS: ICD-10-CM

## 2018-05-30 DIAGNOSIS — I10 HYPERTENSION, ESSENTIAL: ICD-10-CM

## 2018-05-30 DIAGNOSIS — E78.2 MIXED HYPERLIPIDEMIA: ICD-10-CM

## 2018-05-30 DIAGNOSIS — R53.82 CHRONIC FATIGUE: ICD-10-CM

## 2018-05-30 DIAGNOSIS — N30.00 ACUTE CYSTITIS WITHOUT HEMATURIA: ICD-10-CM

## 2018-05-30 DIAGNOSIS — F41.9 ANXIETY: ICD-10-CM

## 2018-05-30 DIAGNOSIS — I25.10 CORONARY ARTERY DISEASE INVOLVING NATIVE CORONARY ARTERY OF NATIVE HEART WITHOUT ANGINA PECTORIS: Primary | ICD-10-CM

## 2018-05-30 RX ORDER — POTASSIUM CHLORIDE 20 MEQ/1
20 TABLET, EXTENDED RELEASE ORAL 2 TIMES DAILY
Qty: 180 TAB | Refills: 1 | Status: SHIPPED | OUTPATIENT
Start: 2018-05-30 | End: 2018-10-30 | Stop reason: SDUPTHER

## 2018-05-30 RX ORDER — CEPHALEXIN 250 MG/1
500 CAPSULE ORAL 2 TIMES DAILY
COMMUNITY
Start: 2018-05-29 | End: 2018-06-04

## 2018-05-30 RX ORDER — EZETIMIBE 10 MG
TABLET ORAL
COMMUNITY

## 2018-05-30 RX ORDER — POTASSIUM CHLORIDE 20 MEQ/1
TABLET, EXTENDED RELEASE ORAL 2 TIMES DAILY
COMMUNITY
End: 2018-05-30 | Stop reason: SDUPTHER

## 2018-05-30 NOTE — PROGRESS NOTES
Tash Reyes is a [de-identified] y.o. female who was seen in clinic today (5/30/2018). Assessment & Plan:   Diagnoses and all orders for this visit:    1. Coronary artery disease involving native coronary artery of native heart without angina pectoris- asymptomatic, does not think recent issues were heart related (will get records). BP is well controlled, lipids are well controlled. Continue: current plan. 2. Hypertension, essential- well controlled, continue current treatment and will restart KCl.    -     potassium chloride (K-DUR, KLOR-CON) 20 mEq tablet; Take 1 Tab by mouth two (2) times a day. 3. Mixed hyperlipidemia- at goal, continue current treatment     4. Gastroesophageal reflux disease without esophagitis- sounds well controlled, no med changes, continue w/ diet habit    5. Anxiety- well controlled, reviewed treatment options with her, reviewed life style changes to help improve mood, reviewed role of daily vs prn meds, continue current treatment. 6. Obesity, morbid ([address]- stable, poorly controlled, needs improvement, I have reviewed/discussed the above normal BMI with the patient. I have recommended the following interventions: encourage exercise and lifestyle education regarding diet. 7. Chronic fatigue- unchanged, has improved slightly w/ change in CPAP, reviewed w/ her labs and w/u to date, will get recent hospital records for review, but at this time no obvious etiologies. Reviewed vitamin B12 and D.    8. Acute cystitis without hematuria- new dx, improving, cont current abx, will repeat UC next week, records requested. -     CULTURE, URINE         Follow-up Disposition:  Return in about 6 months (around 11/30/2018) for FULL PHYSICAL - 30 minutes. Subjective: Poppy Veliz was seen today for Hospital Follow Up; Coronary Artery Disease; Anxiety; and Fatigue    Hospital Follow Up  Tash Reyes is seen for follow up from recent admission to [facility] on 5/31/2018.   We requested the the records. She presented with R sided facial pain, R shoulder, and chest pain. These came out of the blue. She also reports urinary frequency and urgency. She reports they told her no heart attack and had a UTI. She is taking her abx (Keflex) as directed & without any side effects. She reports symptoms are resolved. Cardiovascular Review  The patient has hypertension, hyperlipidemia and coronary artery disease. Since last visit: [facility] suspected [facility], records requested. She stopped KCl supplement. She reports taking medications as instructed, no medication side effects noted, patient does not perform home BP monitoring. Diet and Lifestyle: generally follows a low fat low cholesterol diet, generally follows a low sodium diet, exercises sporadically. Labs: reviewed and discussed with patient. Mental Health Review  Patient is seen for anxiety. Since last visit: no changes. She report anxiety was worse while in the hospital, due to roommate. She denies any: feelings of apprehension/worry, panic attacks, poor concentration/attention and racing thoughts. Reports experiences the following side effects from the treatment: none. GI Review  She has a history of GERD. She denies: abdominal bloating, heartburn, midepigastric pain and nausea. She has identified the following triggers: spicy foods (she is avoiding). Medical therapy currently involves Pepcid. Prior to Admission medications    Medication Sig Start Date End Date Taking? Authorizing Provider   cephALEXin (KEFLEX) 250 mg capsule Take 500 mg by mouth two (2) times a day. 5/29/18 6/4/18 Yes Historical Provider   ezetimibe (ZETIA) 10 mg tablet Take  by mouth.    Yes Historical Provider   busPIRone (BUSPAR) 5 mg tablet TAKE 1 TABLET BY MOUTH TWO  TIMES DAILY 1/1/18  Yes Leobardo Grubbs MD   valsartan-hydroCHLOROthiazide (DIOVAN-HCT) 160-12.5 mg per tablet TAKE 1 TABLET BY MOUTH  DAILY 11/29/17  Yes Merissa Wolf MD   famotidine (PEPCID) 40 mg tablet TAKE 1 TABLET BY MOUTH  DAILY 11/29/17  Yes Merissa Wolf MD   bumetanide (BUMEX) 1 mg tablet TAKE 1 TABLET BY MOUTH TWO  TIMES DAILY 11/29/17  Yes Merissa Wolf MD   fluticasone [facility]) 50 mcg/actuation nasal spray USE 2 SPRAYS IN EACH  NOSTRIL EVERY DAY 10/9/17  Yes Merissa Wolf MD   clobetasol (TEMOVATE) 0.05 % topical cream Apply  to affected area daily as needed for Skin Irritation or Itching. 4/21/16  Yes Merissa Wolf MD   nitroglycerin (NITROSTAT) 0.4 mg SL tablet 1 Tab by SubLINGual route every five (5) minutes as needed for Chest Pain. 5/18/15  Yes Merissa Wolf MD   CINNAMON BARK (CINNAMON PO) Take 1,000 mg by mouth two (2) times a day. Yes Historical Provider   cranberry 500 mg capsule Take 1,000 mg by mouth as needed. Yes Historical Provider   Comp. Stocking,Knee,Regular,Lrg Misc 2 Each by Does Not Apply route daily. 6/10/13  Yes Merissa Wolf MD   aspirin 81 mg tablet Take 81 mg by mouth daily. 9/30/11  Yes Historical Provider   potassium chloride (K-DUR, KLOR-CON) 20 mEq tablet Take  by mouth two (2) times a day. Historical Provider          Allergies   Allergen Reactions    Bactrim [Sulfamethoprim Ds] Rash    Ciprocinonide Other (comments)     Pt states that she is not allergic to this medication. Did not tolerate Cipro (2017) - fatigue & aches    Macrobid [Nitrofurantoin Monohyd/M-Cryst] Unknown (comments)     Flu like      Naprosyn [Naproxen] Hives    Nsaids (Non-Steroidal Anti-Inflammatory Drug) Hives    Prozac [Fluoxetine] Hives    Statins-Hmg-Coa Reductase Inhibitors Myalgia     Did not tolerate simvastatin or atorvastatin    Sulfa (Sulfonamide Antibiotics) Hives    Vioxx [Rofecoxib] Hives     GI Upset    Zoloft [Sertraline] Hives           Review of Systems   Constitutional: Positive for malaise/fatigue. Negative for weight loss.    Respiratory: Negative for cough and shortness of breath. Cardiovascular: Negative for chest pain, palpitations and leg swelling. Gastrointestinal: Negative for abdominal pain, constipation, diarrhea, heartburn, nausea and vomiting. Genitourinary: Negative for dysuria, frequency, hematuria and urgency. Musculoskeletal: Negative for joint pain and myalgias. Skin: Negative for rash. Neurological: Negative for tingling, sensory change and headaches. Psychiatric/Behavioral: Negative for depression. The patient is not nervous/anxious and does not have insomnia. Objective:   Physical Exam   Constitutional: She appears well-developed. No distress. obese   HENT:   Mouth/Throat: Mucous membranes are normal.   Eyes: Conjunctivae and lids are normal. No scleral icterus. Neck: Neck supple. No thyromegaly present. Cardiovascular: Regular rhythm and normal heart sounds. No murmur heard. Pulmonary/Chest: Effort normal and breath sounds normal. She has no wheezes. She has no rales. Abdominal: Soft. Bowel sounds are normal. She exhibits no mass. There is no hepatosplenomegaly. There is no tenderness. Musculoskeletal: She exhibits no edema. Lymphadenopathy:     She has no cervical adenopathy. Skin: No rash noted. Psychiatric: She has a normal mood and affect. Her behavior is normal.         Visit Vitals    /66    Pulse 68    Temp 97.9 °F (36.6 °C) (Oral)    Resp 18    Ht 5' 0.75\" (1.543 m)    Wt 192 lb (87.1 kg)    SpO2 98%    BMI 36.58 kg/m2         Disclaimer:  Advised her to call back or return to office if symptoms worsen/change/persist.  Discussed expected course/resolution/complications of diagnosis in detail with patient. Medication risks/benefits/costs/interactions/alternatives discussed with patient. She was given an after visit summary which includes diagnoses, current medications, & vitals. She expressed understanding with the diagnosis and plan.       Aspects of this note may have been generated using voice recognition software. Despite editing, there may be some syntax errors.        Brandon Mcwilliams MD

## 2018-05-30 NOTE — MR AVS SNAPSHOT
[address] [place] [address] 
[phone] Patient: Norah Capps MRN: [id number] Visit Information Date & Time Provider Department Dept. Phone Encounter #  
 5/30/2018 12:30 PM Carlito Edwards, 1229 Pending sale to [facility] [phone] [id number] Follow-up Instructions Return in about 6 months (around 11/30/2018) for FULL PHYSICAL - 30 minutes. Your Appointments 11/14/2018 11:00 AM  
6 MONTH with ADA Amos [facility] ([address] [place] [address] Road) Appt Note: 6 month check [address] [facility] [phone]  
[phone]  
  
    
 1/21/2019  9:40 AM  
Any with Mel Muro MD  
[address] [place] [address] ([address] [place] [address]) Appt Note: 1 year f/up- bring machine [address] [facility] [address] [phone] [address] [place] [address], [address] [phone] Upcoming Health Maintenance Date Due Influenza Age 5 to Adult 8/1/2018 MEDICARE YEARLY EXAM 10/31/2018 GLAUCOMA SCREENING Q2Y 7/25/2019 DTaP/Tdap/Td series (2 - Td) 10/20/2026 Allergies as of 5/30/2018  Review Complete On: 5/30/2018 By: Carlito Edwards MD  
  
 Severity Noted Reaction Type Reactions Bactrim [Sulfamethoprim Ds]  09/30/2011    Rash Ciprocinonide  09/30/2011    Other (comments) Pt states that she is not allergic to this medication. Did not tolerate Cipro (2017) - fatigue & aches Macrobid [Nitrofurantoin Monohyd/m-cryst]  09/30/2011    Unknown (comments) Flu like Naprosyn [Naproxen]  10/17/2011    Hives Nsaids (Non-steroidal Anti-inflammatory Drug)  09/30/2011    Hives Prozac [Fluoxetine]  10/17/2011    Hives Statins-hmg-coa Reductase Inhibitors  04/29/2015    Myalgia Did not tolerate simvastatin or atorvastatin  
 Sulfa (Sulfonamide Antibiotics)  02/28/2012   Topical Hives Vioxx [Rofecoxib]  09/30/2011    Hives GI Upset Zoloft [Sertraline]  09/30/2011    Hives Current Immunizations  Reviewed on 5/30/2018 Name Date Influenza High Dose Vaccine PF 10/30/2017, 10/26/2016, 10/13/2015, 11/1/2014, 11/11/2013 Influenza Vaccine Split 10/17/2011 Pneumococcal Conjugate (PCV-13) 10/13/2015 TD Vaccine 9/27/1996 Tdap 10/20/2016 ZZZ-RETIRED (DO NOT USE) Pneumococcal Vaccine (Unspecified Type) 9/27/2006 Zoster Vaccine, Live 11/15/2014 Reviewed by Hortensia Lyons RN on 5/30/2018 at 12:40 PM  
You Were Diagnosed With   
  
 Codes Comments Coronary artery disease involving native coronary artery of native heart without angina pectoris    -  Primary ICD-10-CM: I25.10 ICD-9-CM: 414.01 Hypertension, essential     ICD-10-CM: I10 
ICD-9-CM: 401.9 Mixed hyperlipidemia     ICD-10-CM: E78.2 ICD-9-CM: 272.2 Gastroesophageal reflux disease without esophagitis     ICD-10-CM: K21.9 ICD-9-CM: 530.81 Anxiety     ICD-10-CM: F41.9 ICD-9-CM: 300.00 Obesity, morbid ([facility] [address]     ICD-10-CM: E66.01 
ICD-9-CM: 278.01 Chronic fatigue     ICD-10-CM: R53.82 
ICD-9-CM: 780.79 Acute cystitis without hematuria     ICD-10-CM: N30.00 ICD-9-CM: 595.0 Vitals BP Pulse Temp Resp Height(growth percentile) Weight(growth percentile) 128/66 68 97.9 °F (36.6 °C) (Oral) 18 5' 0.75\" (1.543 m) 192 lb (87.1 kg) SpO2 BMI OB Status Smoking Status 98% 36.58 kg/m2 Postmenopausal Former Smoker BMI and BSA Data Body Mass Index Body Surface Area  
 36.58 kg/m 2 1.93 m 2 Preferred Pharmacy Pharmacy Name Phone [address] [place] [address], [address] Your Updated Medication List  
  
   
This list is accurate as of 5/30/18  1:04 PM.  Always use your most recent med list.  
  
 aspirin 81 mg tablet Take 81 mg by mouth daily. bumetanide 1 mg tablet Commonly known as:  Leanord Miu TAKE 1 TABLET BY MOUTH TWO  TIMES DAILY  
  
 busPIRone 5 mg tablet Commonly known as:  BUSPAR  
TAKE 1 TABLET BY MOUTH TWO  TIMES DAILY  
  
 cephALEXin 250 mg capsule Commonly known as:  Elton Kleine Take 500 mg by mouth two (2) times a day. CINNAMON PO Take 1,000 mg by mouth two (2) times a day. clobetasol 0.05 % topical cream  
Commonly known as:  Tho Foil Apply  to affected area daily as needed for Skin Irritation or Itching. Comp. Stocking,Knee,Regular,Lrg Misc  
2 Each by Does Not Apply route daily. cranberry 500 mg capsule Take 1,000 mg by mouth as needed. famotidine 40 mg tablet Commonly known as:  PEPCID  
TAKE 1 TABLET BY MOUTH  DAILY  
  
 fluticasone 50 mcg/actuation nasal spray Commonly known as:  FLONASE  
USE 2 SPRAYS IN EACH  NOSTRIL EVERY DAY  
  
 nitroglycerin 0.4 mg SL tablet Commonly known as:  NITROSTAT  
1 Tab by SubLINGual route every five (5) minutes as needed for Chest Pain.  
  
 potassium chloride 20 mEq tablet Commonly known as:  K-DUR, KLOR-CON Take 1 Tab by mouth two (2) times a day. valsartan-hydroCHLOROthiazide 160-12.5 mg per tablet Commonly known as:  DIOVAN-HCT  
TAKE 1 TABLET BY MOUTH  DAILY  
  
 ZETIA 10 mg tablet Generic drug:  ezetimibe Take  by mouth. Prescriptions Sent to Pharmacy Refills  
 potassium chloride (K-DUR, KLOR-CON) 20 mEq tablet 1 Sig: Take 1 Tab by mouth two (2) times a day. Class: Normal  
 Pharmacy: [address] Ph #: [phone] Route: Oral  
  
We Performed the Following CULTURE, URINE [id number] CPT(R)] Follow-up Instructions Return in about 6 months (around 11/30/2018) for FULL PHYSICAL - 30 minutes. Introducing [facility]! Chad Packer introduces "[employer] patient portal. Now you can access parts of your medical record, email your doctor's office, and request medication refills online. 1. In your internet browser, go to [email or web address] 2. Click on the First Time User? Click Here link in the Sign In box. You will see the New Member Sign Up page. 3. Enter your "[employer] Access Code exactly as it appears below. You will not need to use this code after youve completed the sign-up process. If you do not sign up before the expiration date, you must request a new code. · "[employer] Access Code: [email or web address] Expires: 8/8/2018 11:23 AM 
 
4. Enter the last four digits of your Social Security Number (xxxx) and Date of Birth (mm/dd/yyyy) as indicated and click Submit. You will be taken to the next sign-up page. 5. Create a "[employer] ID. This will be your "[employer] login ID and cannot be changed, so think of one that is secure and easy to remember. 6. Create a "[employer] password. You can change your password at any time. 7. Enter your Password Reset Question and Answer. This can be used at a later time if you forget your password. 8. Enter your e-mail address. You will receive e-mail notification when new information is available in [address] 9. Click Sign Up. You can now view and download portions of your medical record. 10. Click the Download Summary menu link to download a portable copy of your medical information. If you have questions, please visit the Frequently Asked Questions section of the "[employer] website. Remember, "[employer] is NOT to be used for urgent needs. For medical emergencies, dial 911. Now available from your iPhone and Android! Please provide this summary of care documentation to your next provider. Your primary care clinician is listed as Ryann Hernandez. If you have any questions after today's visit, please call [phone].

## 2018-05-31 RX ORDER — POTASSIUM CHLORIDE 20 MEQ/1
TABLET, EXTENDED RELEASE ORAL
Qty: 30 TAB | Refills: 0 | Status: SHIPPED | OUTPATIENT
Start: 2018-05-31 | End: 2018-06-15

## 2018-06-01 ENCOUNTER — TELEPHONE (OUTPATIENT)
Dept: INTERNAL MEDICINE CLINIC | Age: 83
End: 2018-06-01

## 2018-06-01 NOTE — TELEPHONE ENCOUNTER
Pt want to speak to Dr Esequiel Schultz about medicine she is taking.  [employer] was going to give Cortizone shot - but did not get it due to infection she had- will have to go back next Friday to get shot. Pt did not understand that the infection was that bad.   Advise - [phone]

## 2018-06-01 NOTE — TELEPHONE ENCOUNTER
Called pt and notified her Dr Venkata Marcelo did receive her message and he will try to call her today but that he is running behind if he cannot he will call her on Monday. Pt stated she is fine with that.

## 2018-06-01 NOTE — TELEPHONE ENCOUNTER
Please tell patient I will try to call this afternoon, but I am very behind and have a family event this evening. If I cannot call her today I promise I will call her on Monday.

## 2018-06-04 ENCOUNTER — TELEPHONE (OUTPATIENT)
Dept: INTERNAL MEDICINE CLINIC | Age: 83
End: 2018-06-04

## 2018-06-04 NOTE — TELEPHONE ENCOUNTER
Returned patient's call. She wanted to discuss cardiology plan. She has stress test set up but did not want to schedule TTE. Reviewed I would ultimately defer to specialist but would agree this sounds reasonable.

## 2018-06-04 NOTE — TELEPHONE ENCOUNTER
Pt calling the office and wanting Dr Cynthia Brown to know that pt is having a nuclear cardiac stress test this Friday at Dr Daysi Tovar office.

## 2018-06-06 ENCOUNTER — HOSPITAL ENCOUNTER (OUTPATIENT)
Dept: LAB | Age: 83
Discharge: HOME | End: 2018-06-06
Payer: MEDICARE

## 2018-06-06 PROCEDURE — 87077 CULTURE AEROBIC IDENTIFY: CPT

## 2018-06-06 PROCEDURE — 87088 URINE BACTERIA CULTURE: CPT

## 2018-06-06 PROCEDURE — 87086 URINE CULTURE/COLONY COUNT: CPT

## 2018-06-08 ENCOUNTER — TELEPHONE (OUTPATIENT)
Dept: INTERNAL MEDICINE CLINIC | Age: 83
End: 2018-06-08

## 2018-06-08 LAB — BACTERIA UR CULT: ABNORMAL

## 2018-06-08 NOTE — TELEPHONE ENCOUNTER
Pt calling stating she will have her cardiac nuclear test scheduled for 07/6/18. Pt wanted to know can she come in before her nuclear test to have her urine rechecked ?

## 2018-06-08 NOTE — PROGRESS NOTES
Please call patient. UTI has mostly resolved. There is still a little bit of bacteria. I would not recommend treating her at this time. I would recommend repeating another urine test and 1 month. She should contact us in the middle of July when she wants to RTC for urine test.  Does not need an appointment.

## 2018-06-08 NOTE — PROGRESS NOTES
Called pt and notified her that her UTI has mostly resolved and there is a little bit of bacteria. And that Dr Benson Sanchez does not want to treat her at this time. And he recommends pt come in for a repeat urine test in 1 month. And instructed pt to call back in middle of July to schedule a nurse visit to give a urine sample. Pt verbalized understanding.

## 2018-06-11 ENCOUNTER — TELEPHONE (OUTPATIENT)
Dept: INTERNAL MEDICINE CLINIC | Age: 83
End: 2018-06-11

## 2018-06-11 LAB — EF %, EXTERNAL: NORMAL

## 2018-07-03 ENCOUNTER — CLINICAL SUPPORT (OUTPATIENT)
Dept: INTERNAL MEDICINE CLINIC | Age: 83
End: 2018-07-03

## 2018-07-03 ENCOUNTER — HOSPITAL ENCOUNTER (OUTPATIENT)
Dept: LAB | Age: 83
Discharge: HOME | End: 2018-07-03
Payer: MEDICARE

## 2018-07-03 DIAGNOSIS — R35.0 FREQUENCY OF URINATION: ICD-10-CM

## 2018-07-03 DIAGNOSIS — Z87.440 HX: UTI (URINARY TRACT INFECTION): Primary | ICD-10-CM

## 2018-07-03 LAB
BILIRUB UR QL STRIP: NEGATIVE
GLUCOSE UR-MCNC: NEGATIVE MG/DL
KETONES P FAST UR STRIP-MCNC: NEGATIVE MG/DL
PH UR STRIP: 5.5 [PH] (ref 4.6–8)
PROT UR QL STRIP: NEGATIVE
SP GR UR STRIP: 1 (ref 1–1.03)
UA UROBILINOGEN AMB POC: NORMAL (ref 0.2–1)
URINALYSIS CLARITY POC: NORMAL
URINALYSIS COLOR POC: NORMAL
URINE BLOOD POC: NORMAL
URINE LEUKOCYTES POC: NORMAL
URINE NITRITES POC: POSITIVE

## 2018-07-03 PROCEDURE — 87088 URINE BACTERIA CULTURE: CPT

## 2018-07-03 PROCEDURE — 87186 SC STD MICRODIL/AGAR DIL: CPT

## 2018-07-03 PROCEDURE — 87086 URINE CULTURE/COLONY COUNT: CPT

## 2018-07-05 ENCOUNTER — TELEPHONE (OUTPATIENT)
Dept: INTERNAL MEDICINE CLINIC | Age: 83
End: 2018-07-05

## 2018-07-05 DIAGNOSIS — K21.9 GASTROESOPHAGEAL REFLUX DISEASE WITHOUT ESOPHAGITIS: ICD-10-CM

## 2018-07-05 LAB
BACTERIA UR CULT: ABNORMAL
BACTERIA UR CULT: ABNORMAL

## 2018-07-05 RX ORDER — FAMOTIDINE 40 MG/1
TABLET, FILM COATED ORAL
Qty: 90 TAB | Refills: 1 | Status: SHIPPED | OUTPATIENT
Start: 2018-07-05 | End: 2018-11-05 | Stop reason: SDUPTHER

## 2018-07-05 NOTE — TELEPHONE ENCOUNTER
Prelim testing + for UTI, E. Coli, which is different from previous UTI's. Based on allergies will hold off on abx pending sensitivities. Will need to start abx. Should have this back tomorrow.     Okay to get stress test as long as she is feeling okay.      Informed the pt we received UC results back and will hold off on the abx until we receive sensitivites. Ans pt will need to start an abx. But that pt is fine to have nuclear stress test. Pt verbalized understanding.

## 2018-07-05 NOTE — TELEPHONE ENCOUNTER
Prelim testing + for UTI, E. Coli, which is different from previous UTI's. Based on allergies will hold off on abx pending sensitivities. Will need to start abx. Should have this back tomorrow. Okay to get stress test as long as she is feeling okay.

## 2018-07-05 NOTE — TELEPHONE ENCOUNTER
Spoke with Dr Ronnie Dubon office and notified them per Dr Lesli Dillard pt does have a UTI but pt is ok to have nuclear stress test tomorrow. Pt will be notified also.

## 2018-07-06 RX ORDER — AMOXICILLIN 500 MG/1
CAPSULE ORAL
Qty: 21 CAP | Refills: 0 | Status: SHIPPED | OUTPATIENT
Start: 2018-07-06 | End: 2018-07-20 | Stop reason: CLARIF

## 2018-07-06 NOTE — PROGRESS NOTES
Please call patient. Notify her UC positive, does have a UTI, pan-sensitive E. Coli. Start on Amoxicillin 500mg, 1 tab PO TID x 7 days, #21, RF 0.

## 2018-07-07 DIAGNOSIS — J30.2 OTHER SEASONAL ALLERGIC RHINITIS: ICD-10-CM

## 2018-07-08 RX ORDER — FLUTICASONE PROPIONATE 50 MCG
SPRAY, SUSPENSION (ML) NASAL
Qty: 48 G | Refills: 1 | Status: SHIPPED | OUTPATIENT
Start: 2018-07-08

## 2018-07-09 ENCOUNTER — TELEPHONE (OUTPATIENT)
Dept: INTERNAL MEDICINE CLINIC | Age: 83
End: 2018-07-09

## 2018-07-09 NOTE — PROGRESS NOTES
Called pt and spoke to her this past Friday and notified her that her UC does show she has a UTI and and abt has been sent to her pharmacy. Pt verbalized understanding.

## 2018-07-09 NOTE — TELEPHONE ENCOUNTER
Pt calling stating she would like to come back to our office to give another urine sample to be sure her UTI is gone. Asked the pt is she having any symptoms and pt just wanted to be sure UTI is gone. Instructed the pt to take her full round of the abt and then call us back and let us know how she is doing. Pt verbalized understanding.

## 2018-07-16 ENCOUNTER — TELEPHONE (OUTPATIENT)
Dept: INTERNAL MEDICINE CLINIC | Age: 83
End: 2018-07-16

## 2018-07-16 NOTE — TELEPHONE ENCOUNTER
[facility] per VO of Dr. Oliver Mayen, confirmation received.

## 2018-07-19 ENCOUNTER — TELEPHONE (OUTPATIENT)
Dept: INTERNAL MEDICINE CLINIC | Age: 83
End: 2018-07-19

## 2018-07-20 ENCOUNTER — CLINICAL SUPPORT (OUTPATIENT)
Dept: INTERNAL MEDICINE CLINIC | Age: 83
End: 2018-07-20

## 2018-07-20 ENCOUNTER — TELEPHONE (OUTPATIENT)
Dept: INTERNAL MEDICINE CLINIC | Age: 83
End: 2018-07-20

## 2018-07-20 ENCOUNTER — HOSPITAL ENCOUNTER (OUTPATIENT)
Dept: LAB | Age: 83
Discharge: HOME | End: 2018-07-20
Payer: MEDICARE

## 2018-07-20 DIAGNOSIS — R39.11 URINARY HESITANCY: Primary | ICD-10-CM

## 2018-07-20 LAB
BILIRUB UR QL STRIP: NEGATIVE
GLUCOSE UR-MCNC: NEGATIVE MG/DL
KETONES P FAST UR STRIP-MCNC: NEGATIVE MG/DL
PH UR STRIP: 5 [PH] (ref 4.6–8)
PROT UR QL STRIP: NEGATIVE
SP GR UR STRIP: 1 (ref 1–1.03)
UA UROBILINOGEN AMB POC: NORMAL (ref 0.2–1)
URINALYSIS CLARITY POC: NORMAL
URINALYSIS COLOR POC: NORMAL
URINE BLOOD POC: NORMAL
URINE LEUKOCYTES POC: NORMAL
URINE NITRITES POC: POSITIVE

## 2018-07-20 PROCEDURE — 87088 URINE BACTERIA CULTURE: CPT

## 2018-07-20 PROCEDURE — 87077 CULTURE AEROBIC IDENTIFY: CPT

## 2018-07-20 PROCEDURE — 87086 URINE CULTURE/COLONY COUNT: CPT

## 2018-07-20 RX ORDER — AMOXICILLIN AND CLAVULANATE POTASSIUM 875; 125 MG/1; MG/1
1 TABLET, FILM COATED ORAL 2 TIMES DAILY
Qty: 14 TAB | Refills: 0 | Status: SHIPPED | OUTPATIENT
Start: 2018-07-20 | End: 2018-09-19

## 2018-07-20 NOTE — TELEPHONE ENCOUNTER
Pt calling back stating she still feels she has a UTI. Pt states she feels hesitancy and pressure when voiding.

## 2018-07-20 NOTE — TELEPHONE ENCOUNTER
Called the pt back and informed her she would have to come in for a nv to give us a urine sample and if she has a uti Dr Aylin Childress will send in abt but his options are limited due to her allergies. And pt will have to see Urology because this is her 3rd time for having a UTI w/in 2 mos. Pt verbalized understanding and stated will come in this afternoon to give urine sample.

## 2018-07-20 NOTE — TELEPHONE ENCOUNTER
Needs to stop by for NV and to give another urine sample. If she does have another UTI then she will need to see urology as this is 3rd UTI in less then 2 months. I have limited abx options due to allergies.   If her UA is abnormal I will send in an abx pending review of her UC

## 2018-07-21 NOTE — PROGRESS NOTES
Recurrent symptoms. UA abnormal.  Based on previous UC will start on Augmentin. Will need to RTC in 2 wks for repeat.

## 2018-07-24 ENCOUNTER — OFFICE VISIT (OUTPATIENT)
Dept: INTERNAL MEDICINE CLINIC | Age: 83
End: 2018-07-24

## 2018-07-24 VITALS
WEIGHT: 193 LBS | OXYGEN SATURATION: 99 % | SYSTOLIC BLOOD PRESSURE: 144 MMHG | TEMPERATURE: 97.9 F | RESPIRATION RATE: 20 BRPM | HEART RATE: 66 BPM | HEIGHT: 61 IN | BODY MASS INDEX: 36.44 KG/M2 | DIASTOLIC BLOOD PRESSURE: 66 MMHG

## 2018-07-24 DIAGNOSIS — Z12.11 COLON CANCER SCREENING: ICD-10-CM

## 2018-07-24 DIAGNOSIS — R35.0 URINARY FREQUENCY: Primary | ICD-10-CM

## 2018-07-24 LAB
BILIRUB UR QL STRIP: NEGATIVE
GLUCOSE UR-MCNC: NEGATIVE MG/DL
KETONES P FAST UR STRIP-MCNC: NEGATIVE MG/DL
PH UR STRIP: 5 [PH] (ref 4.6–8)
PROT UR QL STRIP: NEGATIVE
SP GR UR STRIP: 1.01 (ref 1–1.03)
UA UROBILINOGEN AMB POC: NORMAL (ref 0.2–1)
URINALYSIS CLARITY POC: CLEAR
URINALYSIS COLOR POC: YELLOW
URINE BLOOD POC: NORMAL
URINE LEUKOCYTES POC: NEGATIVE
URINE NITRITES POC: NEGATIVE

## 2018-07-24 NOTE — PROGRESS NOTES
Follow up UTI, still having urinary frequency. Developed rash, has improved. Has not taken Augmentin today, cut pill in half and took 3 times a day yesterday. Wants to get back to going to community center for water classes.

## 2018-07-24 NOTE — PROGRESS NOTES
Tanya Mcintosh is a 82 y.o. female who was seen in clinic today (7/24/2018). Assessment & Plan:   Diagnoses and all orders for this visit:    1. Urinary frequency- recurrent issue, previous UTI symptoms resolved and they she reports restarted for 1 day. Currently asymptomatic. Reviewed her previous UC results and the abx options we have based on her allergies. Will inquire from [facility] about her current UC that is not resulted but does not sound like a UTI. Favor more irritation from prolonged holding and/or sitting as her symptoms have all resolved. Not sure what to make of 'rash' as it has resolved, will not list Augmentin as allergy at this time. -     AMB POC URINALYSIS DIP STICK AUTO W/O MICRO    2. Colon cancer screening- reviewed guidelines, reviewed her concerns, reviewed her labs and her \"symptoms\" are not consistent w/ colon cancer, she accepted my explanation. Will continue to defer. Follow-up Disposition:  Return if symptoms worsen or fail to improve. Subjective: Gadiel Curran was seen today for Urinary Frequency    Urinary Changes  Gadiel Curran returns to clinic today to discuss concerns about recurrent UTI  This has been present for the last 5-7 days. She reports had one day of inability to urinate and burning at this time, it has since resolved. This happened after spending the day in the hospital, her daughter had recent knee surgery. Her chronic urgency is stable. She denies dysuria, frequency, urgency, hematuria, incontinence. She has been on 3 abx recently: Cephalexin, Amoxicillin, Augmentin. She reports a reaction to Augmentin (red from her waist down). She has been spliting the pills in half and talking differently since then (either 1/2 tab daily or 1/2 tab BID). We reviewed her previous two urine cultures, most recent one grew out E.coli. Her UC from 7/20 has not been resolved. .      She is asking about getting a colonoscopy for colon cancer screening.   She reports has not had one for \"years\" and is concerned. Prior to Admission medications    Medication Sig Start Date End Date Taking? Authorizing Provider   fluticasone (FLONASE) 50 mcg/actuation nasal spray USE 2 SPRAYS IN EACH  NOSTRIL EVERY DAY 7/8/18  Yes Dale Allen MD   famotidine (PEPCID) 40 mg tablet TAKE 1 TABLET BY MOUTH  DAILY 7/5/18  Yes Dale Allen MD   ezetimibe (ZETIA) 10 mg tablet Take  by mouth. Yes Historical Provider   potassium chloride (K-DUR, KLOR-CON) 20 mEq tablet Take 1 Tab by mouth two (2) times a day. 5/30/18  Yes Dale Allen MD   busPIRone (BUSPAR) 5 mg tablet TAKE 1 TABLET BY MOUTH TWO  TIMES DAILY 1/1/18  Yes Dale Allen MD   valsartan-hydroCHLOROthiazide (DIOVAN-HCT) 160-12.5 mg per tablet TAKE 1 TABLET BY MOUTH  DAILY 11/29/17  Yes Dale Allen MD   bumetanide (BUMEX) 1 mg tablet TAKE 1 TABLET BY MOUTH TWO  TIMES DAILY 11/29/17  Yes Dale Allen MD   clobetasol (TEMOVATE) 0.05 % topical cream Apply  to affected area daily as needed for Skin Irritation or Itching. 4/21/16  Yes Dale Allen MD   CINNAMON BARK (CINNAMON PO) Take 1,000 mg by mouth two (2) times a day. Yes Historical Provider   cranberry 500 mg capsule Take 1,000 mg by mouth as needed. Yes Historical Provider   Comp. Stocking,Knee,Regular,Lrg Misc 2 Each by Does Not Apply route daily. 6/10/13  Yes Dale Allen MD   aspirin 81 mg tablet Take 81 mg by mouth daily. 9/30/11  Yes Historical Provider   amoxicillin-clavulanate (AUGMENTIN) 875-125 mg per tablet Take 1 Tab by mouth two (2) times a day. 7/20/18   Dale Allen MD   nitroglycerin (NITROSTAT) 0.4 mg SL tablet 1 Tab by SubLINGual route every five (5) minutes as needed for Chest Pain. 5/18/15   Dale Allen MD          Allergies   Allergen Reactions    Bactrim [Sulfamethoprim Ds] Rash    Ciprocinonide Other (comments)     Pt states that she is not allergic to this medication.   Did not tolerate Cipro (2017) - fatigue & aches    Macrobid [Nitrofurantoin Monohyd/M-Cryst] Unknown (comments)     Flu like      Naprosyn [Naproxen] Hives    Nsaids (Non-Steroidal Anti-Inflammatory Drug) Hives    Prozac [Fluoxetine] Hives    Statins-Hmg-Coa Reductase Inhibitors Myalgia     Did not tolerate simvastatin or atorvastatin    Sulfa (Sulfonamide Antibiotics) Hives    Vioxx [Rofecoxib] Hives     GI Upset    Zoloft [Sertraline] Hives           Review of Systems   Constitutional: Positive for malaise/fatigue. Negative for chills, diaphoresis and fever. Respiratory: Positive for shortness of breath (chronic, unchanged). Negative for cough. Cardiovascular: Negative for chest pain and palpitations. Gastrointestinal: Negative for abdominal pain, constipation, diarrhea, heartburn, nausea and vomiting. Objective:   Physical Exam   Cardiovascular: Regular rhythm and normal heart sounds. No murmur heard. Pulmonary/Chest: Effort normal and breath sounds normal. She has no wheezes. She has no rales. Abdominal: Bowel sounds are normal. She exhibits no mass. There is no hepatosplenomegaly. There is no tenderness. Visit Vitals    /66    Pulse 66    Temp 97.9 °F (36.6 °C) (Oral)    Resp 20    Ht 5' 0.75\" (1.543 m)    Wt 193 lb (87.5 kg)    SpO2 99%    BMI 36.77 kg/m2         Disclaimer:  Advised her to call back or return to office if symptoms worsen/change/persist.  Discussed expected course/resolution/complications of diagnosis in detail with patient. Medication risks/benefits/costs/interactions/alternatives discussed with patient. She was given an after visit summary which includes diagnoses, current medications, & vitals. She expressed understanding with the diagnosis and plan. Aspects of this note may have been generated using voice recognition software. Despite editing, there may be some syntax errors.        Get Baumann MD

## 2018-07-25 LAB
BACTERIA UR CULT: ABNORMAL
BACTERIA UR CULT: ABNORMAL

## 2018-07-25 NOTE — PROGRESS NOTES
Called pt and notified her that her UC was abnormal from 7/20. And since she is asymptomatic at this time. Dr Geovanny Saavedra would like to hold off on giving her an abx since he has given her a number of abx's and since she is still asymptomatic. And since she was last treated with augmentin for 4 days this may have treated her. Pt verbalized understanding.

## 2018-07-25 NOTE — PROGRESS NOTES
Please call patient. UC from 7/20 is abnormal.  She is asymptomatic at this time. Due to number of abx she has been on I would hold off on starting another medication as long as she is still asymptomatic. She was treated w/ augmentin for 4 days which may have treated it.

## 2018-08-06 ENCOUNTER — TELEPHONE (OUTPATIENT)
Dept: INTERNAL MEDICINE CLINIC | Age: 83
End: 2018-08-06

## 2018-08-06 RX ORDER — LOSARTAN POTASSIUM AND HYDROCHLOROTHIAZIDE 12.5; 1 MG/1; MG/1
1 TABLET ORAL DAILY
Qty: 90 TAB | Refills: 0 | Status: SHIPPED | OUTPATIENT
Start: 2018-08-06 | End: 2018-09-19 | Stop reason: SDUPTHER

## 2018-08-06 RX ORDER — LOSARTAN POTASSIUM AND HYDROCHLOROTHIAZIDE 12.5; 1 MG/1; MG/1
1 TABLET ORAL DAILY
Qty: 30 TAB | Refills: 0 | Status: SHIPPED | OUTPATIENT
Start: 2018-08-06 | End: 2018-09-05

## 2018-08-06 NOTE — TELEPHONE ENCOUNTER
Please send in an alternative for patient's diovan -- is quite concerned about this recall -- says everyone keeps asking her why she hasn't stopped taking it.

## 2018-08-07 NOTE — TELEPHONE ENCOUNTER
Spoke with patient, verified name and . Patient  informed of provider recommendations. Patient verbalized understanding.

## 2018-08-07 NOTE — TELEPHONE ENCOUNTER
Please call patient. Due to the national recall of Valsartan (Diovan) we need to make a change in your medications. Valsartan 160mg will be changed to Losartan 100mg. We will send in 90 days with no refills initially. I also sent in 30 days to local pharmacy. This is not a direct 1:1 transition. Please monitor your BP at home and update me if you have any problems/concerns.

## 2018-08-26 RX ORDER — BUMETANIDE 1 MG/1
TABLET ORAL
Qty: 180 TAB | Refills: 1 | Status: SHIPPED | OUTPATIENT
Start: 2018-08-26

## 2018-08-26 RX ORDER — VALSARTAN AND HYDROCHLOROTHIAZIDE 160; 12.5 MG/1; MG/1
TABLET, FILM COATED ORAL
Qty: 90 TAB | OUTPATIENT
Start: 2018-08-26

## 2018-09-17 ENCOUNTER — TELEPHONE (OUTPATIENT)
Dept: INTERNAL MEDICINE CLINIC | Age: 83
End: 2018-09-17

## 2018-09-17 NOTE — TELEPHONE ENCOUNTER
Spoke with patient, he states she feels very fatigued. No chest pain. Also informed to bring list of bp readings to appointment and Patient verbalized understanding. Informed if she begins to have chest pain, SOB. Slurred speech to go to ED Patient verbalized understanding.

## 2018-09-17 NOTE — TELEPHONE ENCOUNTER
Pt states she was told to stop medicine and then take BP for several days by surgeon. BP is going up.   Told to get Appt with PCP since she still does not feel well

## 2018-09-18 ENCOUNTER — DOCUMENTATION ONLY (OUTPATIENT)
Dept: INTERNAL MEDICINE CLINIC | Age: 83
End: 2018-09-18

## 2018-09-18 NOTE — PROGRESS NOTES
Patient came for appointment a day early. Offered her a NV with option of returning for appointment tomorrow or RTC next week to review information with Dr. Erasto Dee. She decided just to return tomorrow for appointment as scheduled.

## 2018-09-19 ENCOUNTER — OFFICE VISIT (OUTPATIENT)
Dept: INTERNAL MEDICINE CLINIC | Age: 83
End: 2018-09-19

## 2018-09-19 VITALS
HEIGHT: 61 IN | OXYGEN SATURATION: 99 % | HEART RATE: 74 BPM | RESPIRATION RATE: 18 BRPM | BODY MASS INDEX: 36.06 KG/M2 | TEMPERATURE: 98.3 F | SYSTOLIC BLOOD PRESSURE: 148 MMHG | WEIGHT: 191 LBS | DIASTOLIC BLOOD PRESSURE: 80 MMHG

## 2018-09-19 DIAGNOSIS — R53.82 CHRONIC FATIGUE: ICD-10-CM

## 2018-09-19 DIAGNOSIS — I10 HYPERTENSION, ESSENTIAL: Primary | ICD-10-CM

## 2018-09-19 DIAGNOSIS — F41.9 ANXIETY: ICD-10-CM

## 2018-09-19 RX ORDER — IRBESARTAN 150 MG/1
150 TABLET ORAL
Qty: 10 TAB | Refills: 0 | Status: SHIPPED | OUTPATIENT
Start: 2018-09-19 | End: 2018-09-29

## 2018-09-19 RX ORDER — IRBESARTAN 150 MG/1
150 TABLET ORAL
Qty: 90 TAB | Refills: 0 | Status: SHIPPED | OUTPATIENT
Start: 2018-09-19

## 2018-09-19 NOTE — PROGRESS NOTES
Denzel Jose is a 82 y.o. female who was seen in clinic today (9/19/2018). Assessment & Plan:   Diagnoses and all orders for this visit:    1. Hypertension, essential- uncontrolled, reviewed ideal BP control for her in her 80's, will change around meds but stick w/ the ARB class. Will start on meds below but avoid HCTZ. Reviewed expectations. Reviewed needs to have only 1 physician writing & adjusting her medications. She will not be going back to cardiology (Dr Franco Ye) as she reports not getting a good \"vibe\". Will revisit cardiology f/u at next visit. Will check BP once/day only. -     irbesartan (AVAPRO) 150 mg tablet; Take 1 Tab by mouth nightly. -     irbesartan (AVAPRO) 150 mg tablet; Take 1 Tab by mouth nightly for 10 days. 2. Anxiety- unclear control, likely uncontrolled, pt is stoic, chart reviewed and has only been on Buspar since '11. Will increase meds slowly to 2 tabs BID instead of changing to SSRI (has allergy to Prozac - hives)    3. Chronic fatigue- unchanged, reviewed multi-factorial, she restart iron supplement and agree this may help, reviewed role of anxiety so will increase meds as above, encouraged to f/u with sleep specialist to assess MARY JANE. Reassured labs look good and no obvious med etiology. Follow-up Disposition:  Return in about 1 month (around 10/19/2018) for Blood pressure check. Subjective: Shante Gallardo was seen today for Hypertension    Cardiovascular Review  The patient has hypertension. Since last visit: she wanted to change cardiologist (concerned about not being examined by specialist). She has seen Dr Marylu Kingston x 2 recently. Notes reviewed. He told her to increase exercise and if she does may not need BP medications. She did increase exercise as directed. She reports she had a f/u with him and he wanted her to stop her BP medications x 1 week and reassess her BP off medications.   BP diary reviewed: 138/83, 140/108, 154/80, 148/96, 150/105. She reports feeling \"horrible\". She reports no changes off medications, still tired. BP medications recently changed from valsartan to losartan due to recall. Diet and Lifestyle: generally follows a low sodium diet, exercises regularly. Labs: reviewed and discussed with patient. She is concerned about her chronic fatigue. Brief Labs:     Lab Results   Component Value Date/Time    Sodium 141 01/12/2018 01:51 PM    Potassium 3.5 01/12/2018 01:51 PM    Creatinine 0.88 01/12/2018 01:51 PM    Cholesterol, total 205 05/03/2017 09:32 AM    HDL Cholesterol 54 05/03/2017 09:32 AM    LDL, calculated 121 05/03/2017 09:32 AM    Triglyceride 151 05/03/2017 09:32 AM    Hemoglobin A1c 5.7 01/12/2018 01:51 PM          Prior to Admission medications    Medication Sig Start Date End Date Taking? Authorizing Provider   ferrous sulfate (IRON PO) Take  by mouth daily. Yes Historical Provider   bumetanide (BUMEX) 1 mg tablet TAKE 1 TABLET BY MOUTH TWO  TIMES DAILY 8/26/18  Yes Freddie Melchor MD   fluticasone [facility]) 50 mcg/actuation nasal spray USE 2 SPRAYS IN EACH  NOSTRIL EVERY DAY 7/8/18  Yes Freddie Melchor MD   famotidine (PEPCID) 40 mg tablet TAKE 1 TABLET BY MOUTH  DAILY 7/5/18  Yes Freddie Melcohr MD   ezetimibe (ZETIA) 10 mg tablet Take  by mouth. Yes Historical Provider   potassium chloride (K-DUR, KLOR-CON) 20 mEq tablet Take 1 Tab by mouth two (2) times a day. 5/30/18  Yes Freddie Melchor MD   busPIRone (BUSPAR) 5 mg tablet TAKE 1 TABLET BY MOUTH TWO  TIMES DAILY 1/1/18  Yes Freddie Melchor MD   clobetasol (TEMOVATE) 0.05 % topical cream Apply  to affected area daily as needed for Skin Irritation or Itching. 4/21/16  Yes Freddie Melchor MD   nitroglycerin (NITROSTAT) 0.4 mg SL tablet 1 Tab by SubLINGual route every five (5) minutes as needed for Chest Pain.  5/18/15  Yes Freddie Melchor MD   CINNAMON BARK (CINNAMON PO) Take 1,000 mg by mouth two (2) times a day.   Yes Historical Provider   cranberry 500 mg capsule Take 1,000 mg by mouth as needed. Yes Historical Provider   Comp. Stocking,Knee,Regular,Lrg Misc 2 Each by Does Not Apply route daily. 6/10/13  Yes Leo Ndiaye MD   aspirin 81 mg tablet Take 81 mg by mouth daily. 9/30/11  Yes Historical Provider   losartan-hydroCHLOROthiazide (HYZAAR) 100-12.5 mg per tablet Take 1 Tab by mouth daily. 8/6/18   Leo Ndiaye MD          Allergies   Allergen Reactions    Bactrim [Sulfamethoprim Ds] Rash    Ciprocinonide Other (comments)     Pt states that she is not allergic to this medication. Did not tolerate Cipro (2017) - fatigue & aches    Macrobid [Nitrofurantoin Monohyd/M-Cryst] Unknown (comments)     Flu like      Naprosyn [Naproxen] Hives    Nsaids (Non-Steroidal Anti-Inflammatory Drug) Hives    Prozac [Fluoxetine] Hives    Statins-Hmg-Coa Reductase Inhibitors Myalgia     Did not tolerate simvastatin or atorvastatin    Sulfa (Sulfonamide Antibiotics) Hives    Vioxx [Rofecoxib] Hives     GI Upset    Zoloft [Sertraline] Hives           Review of Systems   Constitutional: Positive for malaise/fatigue. Respiratory: Negative for cough and shortness of breath. Cardiovascular: Negative for chest pain and palpitations. Gastrointestinal: Negative for abdominal pain, constipation, diarrhea, nausea and vomiting. Psychiatric/Behavioral: Negative for depression. The patient is not nervous/anxious and does not have insomnia. Objective:   Physical Exam   Constitutional:        obese   Cardiovascular: Regular rhythm and normal heart sounds. Pulmonary/Chest: Effort normal and breath sounds normal. She has no wheezes. She has no rales.          Visit Vitals    /80    Pulse 74    Temp 98.3 °F (36.8 °C) (Oral)    Resp 18    Ht 5' 0.75\" (1.543 m)    Wt 191 lb (86.6 kg)    SpO2 99%    BMI 36.39 kg/m2         Disclaimer:  Advised her to call back or return to office if symptoms worsen/change/persist.  Discussed expected course/resolution/complications of diagnosis in detail with patient. Medication risks/benefits/costs/interactions/alternatives discussed with patient. She was given an after visit summary which includes diagnoses, current medications, & vitals. She expressed understanding with the diagnosis and plan. Aspects of this note may have been generated using voice recognition software. Despite editing, there may be some syntax errors.        Chevy Villela MD

## 2018-09-19 NOTE — PATIENT INSTRUCTIONS
Checking your blood pressure at home: Your blood pressure was either borderline or to high. Please check your blood pressure 1 times per day. [place] BP is 120/80. Your BP should be under 140 for the top number and 90 for the bottom number. Please update me either by calling the office (phone number: [phone]) or you can use [employer].

## 2018-09-19 NOTE — MR AVS SNAPSHOT
[address] [facility] [address] [place] [address] 
[phone] Patient: Saúl Grajeda MRN: [id number] NUQ:[id number] Visit Information Date & Time Provider Department Dept. Phone Encounter #  
 9/19/2018  4:15 PM Dania Hernandez, [address] [facility] Internal Medicine [phone] [id number] Follow-up Instructions Return in about 1 month (around 10/19/2018) for Blood pressure check. Your Appointments 11/14/2018 11:00 AM  
6 MONTH with ADA Ahmadi [address] ([facility]) Appt Note: 6 month check [address] [facility] [address] [place] [address] [phone]  
[address] [phone]  
  
    
 11/19/2018  1:30 PM  
Medicare Physical with Dania Hernandez MD  
[address] [address] Internal Medicine [facility] Appt Note: cpe  
 [address] [facility] [address] [facility] [address]  
[address] [address] [address]  
  
    
 1/21/2019  9:40 AM  
Any with Audra Laboy MD  
[address] [place] [address] ([facility]) Appt Note: 1 year f/up- bring machine [address] [facility] [address] [phone] [address] [place] [address]., [address] [phone] Upcoming Health Maintenance Date Due Influenza Age 5 to Adult 8/1/2018 MEDICARE YEARLY EXAM 10/31/2018 GLAUCOMA SCREENING Q2Y 8/8/2020 DTaP/Tdap/Td series (2 - Td) 10/20/2026 Allergies as of 9/19/2018  Review Complete On: 9/19/2018 By: Dania Hernandez MD  
  
 Severity Noted Reaction Type Reactions Bactrim [Sulfamethoprim Ds]  09/30/2011    Rash Ciprocinonide  09/30/2011    Other (comments) Pt states that she is not allergic to this medication. Did not tolerate Cipro (2017) - fatigue & aches Macrobid [Nitrofurantoin Monohyd/m-cryst]  09/30/2011    Unknown (comments) Flu like Naprosyn [Naproxen]  10/17/2011    Hives Nsaids (Non-steroidal Anti-inflammatory Drug)  09/30/2011    Hives Prozac [Fluoxetine]  10/17/2011    Hives Statins-hmg-coa Reductase Inhibitors  04/29/2015    Myalgia Did not tolerate simvastatin or atorvastatin  
 Sulfa (Sulfonamide Antibiotics)  02/28/2012   Topical Hives Vioxx [Rofecoxib]  09/30/2011    Hives GI Upset Zoloft [Sertraline]  09/30/2011    Hives Current Immunizations  Reviewed on 9/19/2018 Name Date Influenza High Dose Vaccine PF 10/30/2017, 10/26/2016, 10/13/2015, 11/1/2014, 11/11/2013 Influenza Vaccine Split 10/17/2011 Pneumococcal Conjugate (PCV-13) 10/13/2015 TD Vaccine 9/27/1996 Tdap 10/20/2016 ZZZ-RETIRED (DO NOT USE) Pneumococcal Vaccine (Unspecified Type) 9/27/2006 Zoster Vaccine, Live 11/15/2014 Reviewed by Jayson Coffey RN on 9/19/2018 at  4:20 PM  
You Were Diagnosed With   
  
 Codes Comments Hypertension, essential    -  Primary ICD-10-CM: I10 
ICD-9-CM: 401.9 Anxiety     ICD-10-CM: F41.9 ICD-9-CM: 300.00 Vitals BP Pulse Temp Resp Height(growth percentile) Weight(growth percentile) 148/80 74 98.3 °F (36.8 °C) (Oral) 18 5' 0.75\" (1.543 m) 191 lb (86.6 kg) SpO2 BMI OB Status Smoking Status 99% 36.39 kg/m2 Postmenopausal Former Smoker Vitals History BMI and BSA Data Body Mass Index Body Surface Area  
 36.39 kg/m 2 1.93 m 2 Preferred Pharmacy Pharmacy Name Phone [facility] [address], [address] [facility] Jose Verma AT [address] [phone] Your Updated Medication List  
  
   
This list is accurate as of 9/19/18  4:50 PM.  Always use your most recent med list.  
  
  
  
  
 aspirin 81 mg tablet Take 81 mg by mouth daily. bumetanide 1 mg tablet Commonly known as:  Paula Ball TAKE 1 TABLET BY MOUTH TWO  TIMES DAILY  
  
 busPIRone 5 mg tablet Commonly known as:  BUSPAR  
TAKE 1 TABLET BY MOUTH TWO  TIMES DAILY CINNAMON PO Take 1,000 mg by mouth two (2) times a day. clobetasol 0.05 % topical cream  
Commonly known as:  Marcha Sous Apply  to affected area daily as needed for Skin Irritation or Itching. Comp. Stocking,Knee,Regular,Lrg Misc  
2 Each by Does Not Apply route daily. cranberry 500 mg capsule Take 1,000 mg by mouth as needed. famotidine 40 mg tablet Commonly known as:  PEPCID  
TAKE 1 TABLET BY MOUTH  DAILY  
  
 fluticasone 50 mcg/actuation nasal spray Commonly known as:  FLONASE  
USE 2 SPRAYS IN EACH  NOSTRIL EVERY DAY  
  
 * irbesartan 150 mg tablet Commonly known as:  AVAPRO Take 1 Tab by mouth nightly. * irbesartan 150 mg tablet Commonly known as:  AVAPRO Take 1 Tab by mouth nightly for 10 days. IRON PO Take  by mouth daily. nitroglycerin 0.4 mg SL tablet Commonly known as:  NITROSTAT  
1 Tab by SubLINGual route every five (5) minutes as needed for Chest Pain.  
  
 potassium chloride 20 mEq tablet Commonly known as:  K-DUR, KLOR-CON Take 1 Tab by mouth two (2) times a day. ZETIA 10 mg tablet Generic drug:  ezetimibe Take  by mouth. * Notice: This list has 2 medication(s) that are the same as other medications prescribed for you. Read the directions carefully, and ask your doctor or other care provider to review them with you. Prescriptions Sent to Pharmacy Refills  
 irbesartan (AVAPRO) 150 mg tablet 0 Sig: Take 1 Tab by mouth nightly. Class: Normal  
 Pharmacy: [address] [place] [address] Ave, [address] Ph #: [phone] Route: Oral  
 irbesartan (AVAPRO) 150 mg tablet 0 Sig: Take 1 Tab by mouth nightly for 10 days. Class: Normal  
 Pharmacy: [facility] [address], [facility] [address] Ph #: [phone]  Route: Oral  
  
 Follow-up Instructions Return in about 1 month (around 10/19/2018) for Blood pressure check. Patient Instructions Checking your blood pressure at home: Your blood pressure was either borderline or to high. Please check your blood pressure 1 times per day. [place] BP is 120/80. Your BP should be under 140 for the top number and 90 for the bottom number. Please update me either by calling the office (phone number: [phone]) or you can use [employer]. Introducing [facility] HE[facility]! [facility] introduces [employer] patient portal. Now you can access parts of your medical record, email your doctor's office, and request medication refills online. 1. In your internet browser, go to [email or web address] 2. Click on the First Time User? Click Here link in the Sign In box. You will see the New Member Sign Up page. 3. Enter your [employer] Access Code exactly as it appears below. You will not need to use this code after youve completed the sign-up process. If you do not sign up before the expiration date, you must request a new code. · [employer] Access Code: [email or web address] Expires: 12/18/2018  4:50 PM 
 
4. Enter the last four digits of your Social Security Number (xxxx) and Date of Birth (mm/dd/yyyy) as indicated and click Submit. You will be taken to the next sign-up page. 5. Create a [employer] ID. This will be your [employer] login ID and cannot be changed, so think of one that is secure and easy to remember. 6. Create a [employer] password. You can change your password at any time. 7. Enter your Password Reset Question and Answer. This can be used at a later time if you forget your password. 8. Enter your e-mail address. You will receive e-mail notification when new information is available in [address] 9. Click Sign Up. You can now view and download portions of your medical record.  
10. Click the Download Summary menu link to download a portable copy of your medical information. If you have questions, please visit the Frequently Asked Questions section of the [employer] website. Remember, [employer] is NOT to be used for urgent needs. For medical emergencies, dial 911. Now available from your iPhone and Android! Please provide this summary of care documentation to your next provider. Your primary care clinician is listed as Aquilino Stewart. If you have any questions after today's visit, please call [phone].

## 2018-09-19 NOTE — Clinical Note
Please call pt. Increase Buspar to 1 tab in AM and 2 tab in PM x 5 days then increase to 2 tabs BID.

## 2018-09-20 ENCOUNTER — TELEPHONE (OUTPATIENT)
Dept: INTERNAL MEDICINE CLINIC | Age: 83
End: 2018-09-20

## 2018-09-20 DIAGNOSIS — F41.9 ANXIETY: ICD-10-CM

## 2018-09-20 NOTE — TELEPHONE ENCOUNTER
Verified patient identity with two identifiers. Spoke with patient and  [employer] Billing on HIPAA by phone. Per Dr. Alvina Perez:   Increase Buspar to 1 tab in AM and 2 tab in PM x 5 days then increase to 2 tabs in AM and 2 tabs in PM.      Patient had difficulty understanding instructions,  was able to write down and read back instructions. He had pill bottle on hand and stated understanding. He will assist patient. Both were instructed to report any side effects and to keep upcoming appts. Patient and  verbalized understanding.

## 2018-09-20 NOTE — TELEPHONE ENCOUNTER
Yessy Day (Self) [phone]     Pt says that dr Kaylin Tidwell put her on avapro and that she took one last night. She says that she is still as drunk as a monkey (her words). She says she will not take any more of this med it makes her feel to weird.

## 2018-10-30 DIAGNOSIS — I10 HYPERTENSION, ESSENTIAL: ICD-10-CM

## 2018-10-30 RX ORDER — POTASSIUM CHLORIDE 20 MEQ/1
TABLET, EXTENDED RELEASE ORAL
Qty: 180 TAB | Refills: 1 | Status: SHIPPED | OUTPATIENT
Start: 2018-10-30

## 2018-10-30 NOTE — TELEPHONE ENCOUNTER
Please call patient. Needs to schedule f/u.   Due for 1 month f/u but also admitted to OSH for ? TIA

## 2018-11-05 DIAGNOSIS — K21.9 GASTROESOPHAGEAL REFLUX DISEASE WITHOUT ESOPHAGITIS: ICD-10-CM

## 2018-11-05 RX ORDER — FAMOTIDINE 40 MG/1
TABLET, FILM COATED ORAL
Qty: 90 TAB | Refills: 1 | Status: SHIPPED | OUTPATIENT
Start: 2018-11-05

## 2018-11-26 NOTE — PROGRESS NOTES
Six month check up. Pt reports no abnormal spotting, bleeding and pain. Pt just completed antibiotics for UTI. Pt denies any frequent or painful urination. Pt request a refill of Clobetasol. 1. Have you been to the ER, urgent care clinic since your last visit? Hospitalized since your last visit? Yes, pt seen in ER in [facility], treated for uti. 2. Have you seen or consulted any other health care providers outside of the [address] [place] [address] since your last visit? Include any pap smears or colon screening.  No

## 2018-11-28 ENCOUNTER — OFFICE VISIT (OUTPATIENT)
Dept: GYNECOLOGY | Age: 83
End: 2018-11-28

## 2018-11-28 VITALS
HEIGHT: 61 IN | WEIGHT: 189 LBS | BODY MASS INDEX: 35.68 KG/M2 | HEART RATE: 67 BPM | DIASTOLIC BLOOD PRESSURE: 74 MMHG | SYSTOLIC BLOOD PRESSURE: 166 MMHG

## 2018-11-28 DIAGNOSIS — R21 VULVAR RASH: ICD-10-CM

## 2018-11-28 DIAGNOSIS — Z87.412 HISTORY OF VULVAR DYSPLASIA: Primary | ICD-10-CM

## 2018-11-28 DIAGNOSIS — L90.0 LICHEN SCLEROSUS: ICD-10-CM

## 2018-11-28 RX ORDER — CLOBETASOL PROPIONATE 0.5 MG/G
CREAM TOPICAL
Qty: 60 G | Refills: 2 | Status: SHIPPED | OUTPATIENT
Start: 2018-11-28

## 2018-11-28 RX ORDER — NYSTATIN 100000 [USP'U]/G
POWDER TOPICAL 4 TIMES DAILY
Qty: 15 G | Refills: 1 | Status: SHIPPED | OUTPATIENT
Start: 2018-11-28 | End: 2019-12-04

## 2018-11-28 NOTE — PATIENT INSTRUCTIONS
Dear Yogesh Sandhu,     It was a pleasure seeing you today. We discussed your history and present course as well as plan. Your exam today showed a mild yeast infection may be present. Here is our treatment plan:  · Return to clinic in 12 months. Sooner if any symptoms as discussed. · Prescription for clobetasol and Nystatin powder to your pharmacy       If you were not given an appointment or have not made one, please call and schedule at your convenience. If you have any questions, please call or email and we will follow up with you as soon as possible. Please understand it may take some time to get back for non-urgent issues. Your patience is appreciated. Do not forget to sign-up for [employer] for access to your patient record, results and to contact us directly through your chart. See attached form for your registration code.       Gema Schroeder [place]  [address] [facility], [address] [place] [address], [address] [place] [address]

## 2018-11-28 NOTE — PROGRESS NOTES
[facility] GYNECOLOGIC ONCOLOGY  [address] [facility], [address]  [phone]      Patient ID:  Ragini Lora  [id number]  1935/83 y.o. Visit date: 11/28/2018      HPI:   Ragini Lora is a pleasant 82 y.o.  female with a history of ABIODUN I s/p laser therapy in 2008 by Ronald Aguiar MD.  She previously used clobetasol for ?hx of lichen sclerosis. Subjective:  She reports recent vulvar rash with soap used at daughter's house. She does experience nocturnal incontinence at times, wearing undergarment. No pain or itching. Rash seems to be dissipating per her report. Denies vaginal discharge, bleeding, pain, itching or new lesions. Recent UTI tx with abx. Denies dysuria. No pelvic pain. She requests a refill of her clobetasol. She is a former smoker, not currently smoking. Last PAP 5/2018: NILM. No prior abnl pap in system back to 2010. Active, restricted by recent ankle injury. Uses cane. 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 Wt is stable. 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  HX HEART CATHETERIZATION  2/12    s/p stent    HX HEENT  3/2002    jarod. laser eye surg.     HX MOHS PROCEDURES Right 4/28/04    HX ORTHOPAEDIC      Right TKR ([place])    HX ORTHOPAEDIC      Right wrist cyst    HX OTHER SURGICAL  5/15/14    PAP- normal    HX TONSIL AND ADENOIDECTOMY         Social History     Socioeconomic History    Marital status:      Spouse name: Not on file    Number of children: Not on file    Years of education: Not on file    Highest education level: Not on file   Social Needs    Financial resource strain: Not on file    Food insecurity - worry: Not on file    Food insecurity - inability: Not on file   [employer] needs - medical: Not on file   [employer] needs - non-medical: Not on file   Occupational History    Not on file   Tobacco Use    Smoking status: Former Smoker     Packs/day: 0.30     Years: 5.00     Pack years: 1.50     Last attempt to quit: 1970     Years since quittin.4    Smokeless tobacco: Never Used   Substance and Sexual Activity    Alcohol use: No     Alcohol/week: 0.0 oz    Drug use: No    Sexual activity: No     Partners: Male   Other Topics Concern    Not on file   Social History Narrative    Not on file       Family History   Problem Relation Age of Onset    Coronary Artery Disease Mother     Hypertension Mother     Heart Failure Mother         CHF    Diabetes Father     Heart Disease Father     Hypertension Sister     Heart Disease Sister     Stroke Sister     Heart Surgery Sister     Heart Disease Brother     Diabetes Brother     No Known Problems Daughter     No Known Problems Daughter     No Known Problems Daughter     No Known Problems Son     Breast Cancer Paternal Grandmother        Current Outpatient Medications on File Prior to Visit   Medication Sig Dispense Refill    famotidine (PEPCID) 40 mg tablet TAKE 1 TABLET BY MOUTH  DAILY 90 Tab 1    potassium chloride (K-DUR, KLOR-CON) 20 mEq tablet TAKE 1 TABLET BY MOUTH TWO  TIMES DAILY 180 Tab 1    ferrous sulfate (IRON PO) Take  by mouth daily.       irbesartan (AVAPRO) 150 mg tablet Take 1 Tab by mouth nightly. 90 Tab 0    bumetanide (BUMEX) 1 mg tablet TAKE 1 TABLET BY MOUTH TWO  TIMES DAILY 180 Tab 1    fluticasone (FLONASE) 50 mcg/actuation nasal spray USE 2 SPRAYS IN EACH  NOSTRIL EVERY DAY 48 g 1    ezetimibe (ZETIA) 10 mg tablet Take  by mouth.  busPIRone (BUSPAR) 5 mg tablet TAKE 1 TABLET BY MOUTH TWO  TIMES DAILY 180 Tab 1    clobetasol (TEMOVATE) 0.05 % topical cream Apply  to affected area daily as needed for Skin Irritation or Itching. 60 g 2    nitroglycerin (NITROSTAT) 0.4 mg SL tablet 1 Tab by SubLINGual route every five (5) minutes as needed for Chest Pain. 1 Bottle 0    CINNAMON BARK (CINNAMON PO) Take 1,000 mg by mouth two (2) times a day.  cranberry 500 mg capsule Take 1,000 mg by mouth as needed.  Comp. Stocking,Knee,Regular,Lrg Misc 2 Each by Does Not Apply route daily. 2 Each 1    aspirin 81 mg tablet Take 81 mg by mouth daily. No current facility-administered medications on file prior to visit. Allergies   Allergen Reactions    Bactrim [Sulfamethoprim Ds] Rash    Ciprocinonide Other (comments)     Pt states that she is not allergic to this medication.   Did not tolerate Cipro (2017) - fatigue & aches    Macrobid [Nitrofurantoin Monohyd/M-Cryst] Unknown (comments)     Flu like      Naprosyn [Naproxen] Hives    Nsaids (Non-Steroidal Anti-Inflammatory Drug) Hives    Prozac [Fluoxetine] Hives    Statins-Hmg-Coa Reductase Inhibitors Myalgia     Did not tolerate simvastatin or atorvastatin    Sulfa (Sulfonamide Antibiotics) Hives    Vioxx [Rofecoxib] Hives     GI Upset    Zoloft [Sertraline] Hives         OBJECTIVE:  Female chaperone present  PHYSICAL EXAM  VITAL SIGNS: Visit Vitals  Ht 5' 0.75\" (1.543 m)   BMI 36.39 kg/m²      GENERAL NAEL: in no apparent distress, A&O x 3   GASTROINT: soft, non-tender, without masses or organomegaly   MUSCULOSKEL: no joint tenderness, deformity or swelling   EXTREMITIES: extremities normal, atraumatic, no cyanosis. Trace edema. AI hose in place. PELVIC: External genitalia: No gross lesion. Erythema with rash bilat and symmetric extending into groin folds with scattered satellite lesions. No bahman suppuration. Smegma periclitoral/labial folds. Vulva without lesions, no loss of labial folds. BUS negative  Vaginal: atrophic mucosa, no suspicious masses, induration or nodularity   Cervix: normal without discharge or lesions  Adnexa: normal bimanual exam and non palpable, PSW clear   RECTAL: Deferred. Anus/perineum w/o lesions   MALIKA SURVEY: Cervical and axillary nodes normal.   NEURO: Grossly normal     Wt Readings from Last 3 Encounters:   18 189 lb (85.7 kg)   18 191 lb (86.6 kg)   18 193 lb (87.5 kg)       DATE REVIEW as available:  Lab Results   Component Value Date/Time    WBC 9.0 2018 01:51 PM    HGB 11.3 2018 01:51 PM    HCT 34.7 2018 01:51 PM    PLATELET 757  01:51 PM    MCV 89 2018 01:51 PM     Lab Results   Component Value Date/Time    Sodium 141 2018 01:51 PM    Potassium 3.5 2018 01:51 PM    Chloride 96 2018 01:51 PM    CO2 31 (H) 2018 01:51 PM    Anion gap 10 2017 01:54 PM    Glucose 87 2018 01:51 PM    BUN 17 2018 01:51 PM    Creatinine 0.88 2018 01:51 PM    BUN/Creatinine ratio 19 2018 01:51 PM    GFR est AA 72 2018 01:51 PM    GFR est non-AA 62 2018 01:51 PM    Calcium 10.2 2018 01:51 PM       IMPRESSION AND PLAN:  Naomy Mcdaniel has a working diagnosis of ABIODUN I s/p laser therapy . Doing well, clinically JAKE. ECO        ICD-10-CM ICD-9-CM    1. History of vulvar dysplasia Z87. 412 V13.24    2. Lichen sclerosus N26.4 701.0 clobetasol (TEMOVATE) 0.05 % topical cream   3. Vulvar rash R21 782.1        Return twelve months for vulvoscopy or PRN. Nystatin to labial rash. Stressed hygiene. Hold clobetasol for now.   When clears, may resume  All questions answered.       ADA Daniel  Gyn Onc

## 2019-01-21 ENCOUNTER — DOCUMENTATION ONLY (OUTPATIENT)
Dept: SLEEP MEDICINE | Age: 84
End: 2019-01-21

## 2019-01-21 ENCOUNTER — OFFICE VISIT (OUTPATIENT)
Dept: SLEEP MEDICINE | Age: 84
End: 2019-01-21

## 2019-01-21 ENCOUNTER — HOSPITAL ENCOUNTER (OUTPATIENT)
Dept: SLEEP MEDICINE | Age: 84
Discharge: HOME | End: 2019-01-21
Payer: MEDICARE

## 2019-01-21 VITALS
BODY MASS INDEX: 35.87 KG/M2 | WEIGHT: 190 LBS | HEIGHT: 61 IN | HEART RATE: 88 BPM | TEMPERATURE: 97.5 F | SYSTOLIC BLOOD PRESSURE: 122 MMHG | DIASTOLIC BLOOD PRESSURE: 89 MMHG | OXYGEN SATURATION: 98 %

## 2019-01-21 VITALS
OXYGEN SATURATION: 97 % | DIASTOLIC BLOOD PRESSURE: 65 MMHG | SYSTOLIC BLOOD PRESSURE: 105 MMHG | HEIGHT: 61 IN | BODY MASS INDEX: 35.87 KG/M2 | WEIGHT: 190 LBS | HEART RATE: 70 BPM

## 2019-01-21 DIAGNOSIS — G47.33 OBSTRUCTIVE SLEEP APNEA SYNDROME: ICD-10-CM

## 2019-01-21 DIAGNOSIS — I10 HYPERTENSION, ESSENTIAL: ICD-10-CM

## 2019-01-21 DIAGNOSIS — I25.10 CORONARY ARTERY DISEASE INVOLVING NATIVE CORONARY ARTERY OF NATIVE HEART WITHOUT ANGINA PECTORIS: ICD-10-CM

## 2019-01-21 DIAGNOSIS — G47.33 OBSTRUCTIVE SLEEP APNEA SYNDROME: Primary | ICD-10-CM

## 2019-01-21 PROCEDURE — 95811 POLYSOM 6/>YRS CPAP 4/> PARM: CPT | Performed by: INTERNAL MEDICINE

## 2019-01-21 RX ORDER — MEMANTINE HYDROCHLORIDE 5 MG/1
TABLET ORAL
COMMUNITY
Start: 2019-01-17

## 2019-01-21 NOTE — PROGRESS NOTES
PAP Education     · Physician orders were reviewed. · The patient demonstrated good understanding of the positive airway pressure device. · General information regarding operations and maintenance of her PAP humidifier was provided. She was provided information on titration study to be preformed tonight. Tech to properly fit patients current mask and start Titration with patient Remed Air Touch Small. She was asked to contact our office for any problems regarding his PAP therapy.

## 2019-01-21 NOTE — PROGRESS NOTES
[address] [place]. [place], [address]  Tel.  [phone]  Fax. [address] [facility] [address]  [place], [address] [facility]  Tel.  [phone]  Fax. [phone] [address] [address] [place]   Tel.  [phone]  Fax. [phone]     Joni Casanova is a 82 y.o. female seen for a positive airway pressure follow-up. She reports no problems using the device. The following problems are identified:    Drowsiness no Problems exhaling no   Snoring no Forget to put on no   Mask Comfortable yes Can't fall asleep no   Dry Mouth At times, concerned that it runs out of water. Mask falls off no   Air Leaking no Frequent awakenings no     Download reviewed. She has been having trouble with her memory, had a possible TIA in September and again 2 weeks ago. She admits that her sleep has improved. Therapy Apnea Index averaged over PAP use: 4 /hr which reflects improved sleep breathing condition. Allergies   Allergen Reactions    Bactrim [Sulfamethoprim Ds] Rash    Ciprocinonide Other (comments)     Pt states that she is not allergic to this medication. Did not tolerate Cipro (2017) - fatigue & aches    Macrobid [Nitrofurantoin Monohyd/M-Cryst] Unknown (comments)     Flu like      Naprosyn [Naproxen] Hives    Nsaids (Non-Steroidal Anti-Inflammatory Drug) Hives    Prozac [Fluoxetine] Hives    Statins-Hmg-Coa Reductase Inhibitors Myalgia     Did not tolerate simvastatin or atorvastatin    Sulfa (Sulfonamide Antibiotics) Hives    Vioxx [Rofecoxib] Hives     GI Upset    Zoloft [Sertraline] Hives       She has a current medication list which includes the following prescription(s): memantine, clobetasol, famotidine, potassium chloride, ferrous sulfate, bumetanide, fluticasone, ezetimibe, buspirone, cinnamon bark, cranberry, comp.stocking,knee,regular,lrg, aspirin, nystatin, irbesartan, and nitroglycerin. .      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 Waite Sleepiness Score: 4   and Modified F.O.S.Q. Score Total / 2: 17.5      O>    Visit Vitals  /65 (BP 1 Location: Left arm, BP Patient Position: Sitting)   Pulse 70   Ht 5' 0.75\" (1.543 m)   Wt 190 lb (86.2 kg)   SpO2 97%   BMI 36.20 kg/m²           General:   Alert, oriented, not in distress   Neck:   No JVD    Chest/Lungs:  symetrical lung expansion , no accessory muscle use    Extremities:  no obvious rashes , negative edema    Neuro:  No focal deficits ; No obvious tremor    Psych:  Normal affect ,  Normal countenance ;         A>    ICD-10-CM ICD-9-CM    1. Obstructive sleep apnea syndrome G47.33 327.23 SLEEP LAB (PAP TITRATION)   2. Coronary artery disease involving native coronary artery of native heart without angina pectoris I25.10 414.01    3. Hypertension, essential I10 401.9      On Bi - Level :  16/12 cmH2O. Compliant:      yes    Therapeutic Response:  Positive    P>      * Follow-up Disposition:  Return in about 1 year (around 1/21/2020). We will schedule her titration to ensure her stenting pressures adequate to control her oxygenation/apnea  Record release for recent hospital/medical records/updated medications  Tech to address humidifier questions - see tech note. In the meantime,continue current settings  * She was asked to contact our office for any problems regarding PAP therapy. * Counseling was provided regarding the importance of regular PAP use and on proper sleep hygiene and safe driving. * Re-enforced proper and regular cleaning for the device.   2. Coronary Artery Disease /TIA?- she continues on her current regimen. I have reviewed the relationship between heart disease/stroke and sleep disordered breathing. 3. Hypertension - she continues on her current regimen. I have reviewed the relationship between hypertension as it relates to sleep-disordered breathing.      Electronically signed by    Jorge L Shook MD  Diplomate in Sleep Medicine  [employer]

## 2019-01-21 NOTE — PATIENT INSTRUCTIONS
[address] [place]. [place], [address]  Tel.  [phone]  Fax. 100 [facility] 60  [place], [address] [facility]  Tel.  [phone]  Fax. [phone] [address] [facility] [place]  Tel.  [phone]  Fax. [phone]     PROPER SLEEP HYGIENE    What to avoid  · Do not have drinks with caffeine, such as coffee or black tea, for 8 hours before bed. · Do not smoke or use other types of tobacco near bedtime. Nicotine is a stimulant and can keep you awake. · Avoid drinking alcohol late in the evening, because it can cause you to wake in the middle of the night. · Do not eat a big meal close to bedtime. If you are hungry, eat a light snack. · Do not drink a lot of water close to bedtime, because the need to urinate may wake you up during the night. · Do not read or watch TV in bed. Use the bed only for sleeping and sexual activity. What to try  · Go to bed at the same time every night, and wake up at the same time every morning. Do not take naps during the day. · Keep your bedroom quiet, dark, and cool. · Get regular exercise, but not within 3 to 4 hours of your bedtime. .  · Sleep on a comfortable pillow and mattress. · If watching the clock makes you anxious, turn it facing away from you so you cannot see the time. · If you worry when you lie down, start a worry book. Well before bedtime, write down your worries, and then set the book and your concerns aside. · Try meditation or other relaxation techniques before you go to bed. · If you cannot fall asleep, get up and go to another room until you feel sleepy. Do something relaxing. Repeat your bedtime routine before you go to bed again. · Make your house quiet and calm about an hour before bedtime. Turn down the lights, turn off the TV, log off the computer, and turn down the volume on music. This can help you relax after a busy day.     Drowsy Driving  The [address] [place] [address] [employer] Traffic Safety Administration cites drowsiness as a causing factor in more than 348,355 police reported crashes annually, resulting in 76,000 injuries and 1,500 deaths. Other surveys suggest 55% of people polled have driven while drowsy in the past year, 23% had fallen asleep but not crashed, 3% crashed, and 2% had and accident due to drowsy driving. Who is at risk? Young Drivers: One study of drowsy driving accidents states that 55% of the drivers were under 25 years. Of those, 75% were male. Shift Workers and Travelers: People who work overnight or travel across time zones frequently are at higher risk of experiencing Circadian Rhythm Disorders. They are trying to work and function when their body is programed to sleep. Sleep Deprived: Lack of sleep has a serious impact on your ability to pay attention or focus on a task. Consistently getting less than the average of 8 hours your body needs creates partial or cumulative sleep deprivation. Untreated Sleep Disorders: Sleep Apnea, Narcolepsy, R.L.S., and other sleep disorders (untreated) prevent a person from getting enough restful sleep. This leads to excessive daytime sleepiness and increases the risk for drowsy driving accidents by up to 7 times. Medications / Alcohol: Even over the counter medications can cause drowsiness. Medications that impair a drivers attention should have a warning label. Alcohol naturally makes you sleepy and on its own can cause accidents. Combined with excessive drowsiness its effects are amplified. Signs of Drowsy Driving:   * You don't remember driving the last few miles   * You may drift out of your yassine   * You are unable to focus and your thoughts wander   * You may yawn more often than normal   * You have difficulty keeping your eyes open / nodding off   * Missing traffic signs, speeding, or tailgating  Prevention-   Good sleep hygiene, lifestyle and behavioral choices have the most impact on drowsy driving.  There is no substitute for sleep and the average person requires 8 hours nightly. If you find yourself driving drowsy, stop and sleep. Consider the sleep hygiene tips provided during your visit as well. Medication Refill Policy: Refills for all medications require 1 week advance notice. Please have your pharmacy fax a refill request. We are unable to fax, or call in \"controled substance\" medications and you will need to pick these prescriptions up from our office. [email or web address] Activation    Thank you for requesting access to [email or web address]. Please follow the instructions below to securely access and download your online medical record. [email or web address] allows you to send messages to your doctor, view your test results, renew your prescriptions, schedule appointments, and more. How Do I Sign Up? 1. In your internet browser, go to [email or web address]. 2. Click on the First Time User? Click Here link in the Sign In box. You will see the New Member Sign Up page. 3. Enter your [email or web address] Access Code exactly as it appears below. You will not need to use this code after youve completed the sign-up process. If you do not sign up before the expiration date, you must request a new code. [email or web address] Access Code: [email or web address]  Expires: 3/7/2019  9:59 AM (This is the date your [email or web address] access code will )    4. Enter the last four digits of your Social Security Number (xxxx) and Date of Birth (mm/dd/yyyy) as indicated and click Submit. You will be taken to the next sign-up page. 5. Create a [email or web address] ID. This will be your [email or web address] login ID and cannot be changed, so think of one that is secure and easy to remember. 6. Create a [email or web address] password. You can change your password at any time. 7. Enter your Password Reset Question and Answer. This can be used at a later time if you forget your password. 8. Enter your e-mail address. You will receive e-mail notification when new information is available in [address] 9. Click Sign Up.  You can now view and download portions of your medical record. 10. Click the Download Summary menu link to download a portable copy of your medical information. Additional Information    If you have questions, please call [phone]. Remember, [place] is NOT to be used for urgent needs. For medical emergencies, dial 911.

## 2019-01-24 ENCOUNTER — DOCUMENTATION ONLY (OUTPATIENT)
Dept: SLEEP MEDICINE | Age: 84
End: 2019-01-24

## 2019-08-26 ENCOUNTER — OFFICE VISIT (OUTPATIENT)
Dept: SLEEP MEDICINE | Age: 84
End: 2019-08-26

## 2019-08-26 VITALS
WEIGHT: 191 LBS | DIASTOLIC BLOOD PRESSURE: 70 MMHG | HEIGHT: 61 IN | TEMPERATURE: 96.7 F | RESPIRATION RATE: 16 BRPM | BODY MASS INDEX: 36.06 KG/M2 | OXYGEN SATURATION: 99 % | SYSTOLIC BLOOD PRESSURE: 120 MMHG | HEART RATE: 56 BPM

## 2019-08-26 DIAGNOSIS — I10 HYPERTENSION, ESSENTIAL: ICD-10-CM

## 2019-08-26 DIAGNOSIS — I25.10 CORONARY ARTERY DISEASE INVOLVING NATIVE CORONARY ARTERY OF NATIVE HEART WITHOUT ANGINA PECTORIS: ICD-10-CM

## 2019-08-26 DIAGNOSIS — G47.33 OBSTRUCTIVE SLEEP APNEA (ADULT) (PEDIATRIC): Primary | ICD-10-CM

## 2019-08-26 RX ORDER — DONEPEZIL HYDROCHLORIDE 10 MG/1
TABLET, FILM COATED ORAL
COMMUNITY
Start: 2019-07-17

## 2019-08-26 NOTE — PROGRESS NOTES
[address] [facility]. [place], [address]   Tel.  [phone]   Fax. 100 [facility] 60   [place], [address] [facility]   Tel.  [phone]   Fax. [phone] [address]    Tel.  [phone]   Fax. [phone]       Subjective: Rafal North is a 82 y.o. female seen for a positive airway pressure follow-up, last seen by Dr. Sheryle Printers on 1/21/2019, prior notes reviewed in detail. Initial sleep apnea diagnosis in 2001, in lab sleep test done in 2011 at [address] [facility] showed AHI of 45/hr at that time. Adequate titration on BiPAP 16/12 completed 7/2012. Memory concerns noted at last visit, BiPAP titration attempted but unsuccessful, patient left after 1 hour of study. Noctural Oximetry done showing 14 minutes below 88% with a lowest O2 of 66%. She is here today because she is feeling claustrophobic with her current mask and would like to try a nasal mask. She reports no problems using the device. The following concerns reviewed:    Drowsiness no Problems exhaling no   Snoring no Forget to put on no   Mask Comfortable no Can't fall asleep no   Dry Mouth no Mask falls off no   Air Leaking no Frequent awakenings yes       She admits that her sleep has improved on PAP therapy using Full face mask and heated tubing. Review of device download indicated:  Set pressure: IPAP 16 cmH2O, EPAP 12 cmH2O; Average % Night in Large Leak: 0.3; % Used Days >= 4 hours: 100. Therapy Apnea Index averaged over PAP use: 5.4 /hr which reflects improved sleep breathing condition. Smiley Sleepiness Score: 3 and Modified F.O.S.Q. Score Total / 2: 16 which reflects improved sleep quality over therapy time. Allergies   Allergen Reactions    Bactrim [Sulfamethoprim Ds] Rash    Ciprocinonide Other (comments)     Pt states that she is not allergic to this medication.   Did not tolerate Cipro (2017) - fatigue & aches    Macrobid [Nitrofurantoin Monohyd/M-Cryst] Unknown (comments)     Flu like      Naprosyn [Naproxen] Hives    Nsaids (Non-Steroidal Anti-Inflammatory Drug) Hives    Prozac [Fluoxetine] Hives    Statins-Hmg-Coa Reductase Inhibitors Myalgia     Did not tolerate simvastatin or atorvastatin    Sulfa (Sulfonamide Antibiotics) Hives    Vioxx [Rofecoxib] Hives     GI Upset    Zoloft [Sertraline] Hives       She has a current medication list which includes the following prescription(s): memantine, clobetasol, famotidine, irbesartan, fluticasone propionate, buspirone, nitroglycerin, cranberry, comp.stocking,knee,regular,lrg, aspirin, donepezil, nystatin, potassium chloride, ferrous sulfate, bumetanide, ezetimibe, and cinnamon bark. .      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 Sleep Review of Systems: notable for Positive difficulty falling asleep; Positive awakenings at night; Negative early morning headaches; Negative memory problems; Negative concentration issues;  Negative chest pain; Negative shortness of breath; Negative significant joint pain at night; Negative significant muscle pain at night; Negative rashes or itching; Negative heartburn; Negative significant mood issues; 7 afternoon naps per week; Negative history of any automobile or occupational accidents due to daytime drowsiness      Objective:     Visit Vitals  /70 (BP 1 Location: Left arm, BP Patient Position: Sitting)   Pulse (!) 56   Temp 96.7 °F (35.9 °C) (Oral)   Resp 16   Ht 5' 0.75\" (1.543 m)   Wt 191 lb (86.6 kg)   SpO2 99%   BMI 36.39 kg/m²          General: Alert, oriented, not in acute distress   Eyes:  Anicteric Sclerae; no obvious strabismus   Nose:  No obvious nasal septum deviation    Oropharynx:   Mallampati score 4, thick tongue base, uvula not seen due to low-lying soft palate, narrow tonsilo-pharyngeal pilars   Neck:   Midline trachea   Chest/Lungs:  Symmetrical lung expansion, clear lung fields on auscultation    CVS:  Normal rate, regular rhythm,  no JVD   Extremities:  No obvious rashes, no edema    Neuro:  No focal deficits; No obvious tremor    Psych:  Normal affect,  normal countenance       Assessment:       ICD-10-CM ICD-9-CM    1. Obstructive sleep apnea (adult) (pediatric) G47.33 327.23 SLEEP LAB (PAP TITRATION)   2. Hypertension, essential I10 401.9    3. Coronary artery disease involving native coronary artery of native heart without angina pectoris I25.10 414.01    4. Adult BMI 36.0-36.9 kg/sq m Z68.36 V85.36        AHI = ?. On Bi - Level :  IPAP 16 cmH2O, EPAP 12 cmH2O. She is compliant with PAP therapy and PAP continues to benefit patient and remains necessary for control of her sleep apnea. Plan:     Follow-up and Dispositions    · Return in about 6 months (around 2/26/2020). * Continue on current pressures    * BiPAP Titration ordered, we discussed the need to determine optimal pressure so that oxygen level does not drop below 88% while sleeping. She would like to change to a nasal mask and use that for the titration. I explained that if we are not able to solve oxygenation concerns with pressure changes that we would discuss additional options such as supplemental Oxygen during BiPAP use. Orders Placed This Encounter    SLEEP LAB (PAP TITRATION)     Standing Status:   Future     Standing Expiration Date:   2/26/2020     Scheduling Instructions:      Please route to Dr. Nguyen Okeefe for interp. Current BiPAP setting 16/12. Pt is reporting feeling claustrophobic with full face mask she has been using.      Order Specific Question:   Reason for Exam     Answer: BiPAP titration, nasal mask trial     * Counseling was provided regarding the importance of regular PAP use with emphasis on ensuring sufficient total sleep time, proper sleep hygiene, and safe driving. * Re-enforced proper and regular cleaning for the device. * She was asked to contact our office for any problems regarding PAP therapy. 2. Hypertension -  continue on her current regimen, she will continue to monitor her BP and follow up with her PMD for reevaluation/adjustment of medications if warranted. I have reviewed the relationship between hypertension as it relates to sleep-disordered breathing. 3. Coronary Artery Disease - continue on her current regimen. I have reviewed the relationship between heart disease and sleep disordered breathing. 4. Recommended a dedicated weight loss program through appropriate diet and exercise regimen as significant weight reduction has been shown to reduce severity of obstructive sleep apnea. Patient's phone number [phone] (home)  and [phone] were reviewed and confirmed for accuracy. She gives permission for messages regarding results and appointments to be left at that number. RIP Spencer[facility], [address] [place] [address]  Electronically signed.  08/26/19

## 2019-08-26 NOTE — PATIENT INSTRUCTIONS
[address] [facility] [place]. [place], [address]  Tel.  [phone]  Fax. 100 [facility] 60  [place], [address] [facility]  Tel.  [phone]  Fax. [phone] [address] [facility] [address]  Tel.  [phone]  Fax. [phone]     Learning About CPAP for Sleep Apnea  What is CPAP? CPAP is a small machine that you use at home every night while you sleep. It increases air pressure in your throat to keep your airway open. When you have sleep apnea, this can help you sleep better so you feel much better. CPAP stands for \"continuous positive airway pressure. \"  The CPAP machine will have one of the following:  · A mask that covers your nose and mouth  · Prongs that fit into your nose  · A mask that covers your nose only, the most common type. This type is called NCPAP. The N stands for \"nasal.\"  Why is it done? CPAP is usually the best treatment for obstructive sleep apnea. It is the first treatment choice and the most widely used. Your doctor may suggest CPAP if you have:  · Moderate to severe sleep apnea. · Sleep apnea and coronary artery disease (CAD) or heart failure. How does it help? · CPAP can help you have more normal sleep, so you feel less sleepy and more alert during the daytime. · CPAP may help keep heart failure or other heart problems from getting worse. · NCPAP may help lower your blood pressure. · If you use CPAP, your bed partner may also sleep better because you are not snoring or restless. What are the side effects? Some people who use CPAP have:  · A dry or stuffy nose and a sore throat. · Irritated skin on the face. · Sore eyes. · Bloating. If you have any of these problems, work with your doctor to fix them. Here are some things you can try:  · Be sure the mask or nasal prongs fit well. · See if your doctor can adjust the pressure of your CPAP. · If your nose is dry, try a humidifier.   · If your nose is runny or stuffy, try decongestant medicine or a steroid nasal spray. If these things do not help, you might try a different type of machine. Some machines have air pressure that adjusts on its own. Others have air pressures that are different when you breathe in than when you breathe out. This may reduce discomfort caused by too much pressure in your nose. Where can you learn more? Go to WorkCast.be  Enter Karla Holloway in the search box to learn more about \"Learning About CPAP for Sleep Apnea. \"   © 5546-2961 Healthwise, Incorporated. Care instructions adapted under license by [facility] (which disclaims liability or warranty for this information). This care instruction is for use with your licensed healthcare professional. If you have questions about a medical condition or this instruction, always ask your healthcare professional. [address] any warranty or liability for your use of this information. Content Version: 3.3.84977; Last Revised: January 11, 2010  PROPER SLEEP HYGIENE    What to avoid  · Do not have drinks with caffeine, such as coffee or black tea, for 8 hours before bed. · Do not smoke or use other types of tobacco near bedtime. Nicotine is a stimulant and can keep you awake. · Avoid drinking alcohol late in the evening, because it can cause you to wake in the middle of the night. · Do not eat a big meal close to bedtime. If you are hungry, eat a light snack. · Do not drink a lot of water close to bedtime, because the need to urinate may wake you up during the night. · Do not read or watch TV in bed. Use the bed only for sleeping and sexual activity. What to try  · Go to bed at the same time every night, and wake up at the same time every morning. Do not take naps during the day. · Keep your bedroom quiet, dark, and cool. · Get regular exercise, but not within 3 to 4 hours of your bedtime. .  · Sleep on a comfortable pillow and mattress.   · If watching the clock makes you anxious, turn it facing away from you so you cannot see the time. · If you worry when you lie down, start a worry book. Well before bedtime, write down your worries, and then set the book and your concerns aside. · Try meditation or other relaxation techniques before you go to bed. · If you cannot fall asleep, get up and go to another room until you feel sleepy. Do something relaxing. Repeat your bedtime routine before you go to bed again. · Make your house quiet and calm about an hour before bedtime. Turn down the lights, turn off the TV, log off the computer, and turn down the volume on music. This can help you relax after a busy day. Drowsy Driving: The [employer] cites drowsiness as a causing factor in more than 477,395 police reported crashes annually, resulting in 76,000 injuries and 1,500 deaths. Other surveys suggest 55% of people polled have driven while drowsy in the past year, 23% had fallen asleep but not crashed, 3% crashed, and 2% had and accident due to drowsy driving. Who is at risk? Young Drivers: One study of drowsy driving accidents states that 55% of the drivers were under 25 years. Of those, 75% were male. Shift Workers and Travelers: People who work overnight or travel across time zones frequently are at higher risk of experiencing Circadian Rhythm Disorders. They are trying to work and function when their body is programed to sleep. Sleep Deprived: Lack of sleep has a serious impact on your ability to pay attention or focus on a task. Consistently getting less than the average of 8 hours your body needs creates partial or cumulative sleep deprivation. Untreated Sleep Disorders: Sleep Apnea, Narcolepsy, R.L.S., and other sleep disorders (untreated) prevent a person from getting enough restful sleep. This leads to excessive daytime sleepiness and increases the risk for drowsy driving accidents by up to 7 times.   Medications / Alcohol: Even over the counter medications can cause drowsiness. Medications that impair a drivers attention should have a warning label. Alcohol naturally makes you sleepy and on its own can cause accidents. Combined with excessive drowsiness its effects are amplified. Signs of Drowsy Driving:   * You don't remember driving the last few miles   * You may drift out of your yassine   * You are unable to focus and your thoughts wander   * You may yawn more often than normal   * You have difficulty keeping your eyes open / nodding off   * Missing traffic signs, speeding, or tailgating  Prevention-   Good sleep hygiene, lifestyle and behavioral choices have the most impact on drowsy driving. There is no substitute for sleep and the average person requires 8 hours nightly. If you find yourself driving drowsy, stop and sleep. Consider the sleep hygiene tips provided during your visit as well. Medication Refill Policy: Refills for all medications require 1 week advance notice. Please have your pharmacy fax a refill request. We are unable to fax, or call in \"controled substance\" medications and you will need to pick these prescriptions up from our office. iRidge Activation    Thank you for requesting access to iRidge. Please follow the instructions below to securely access and download your online medical record. iRidge allows you to send messages to your doctor, view your test results, renew your prescriptions, schedule appointments, and more. How Do I Sign Up? 1. In your internet browser, go to [email or web address]. 2. Click on the First Time User? Click Here link in the Sign In box. You will see the New Member Sign Up page. 3. Enter your iRidge Access Code exactly as it appears below. You will not need to use this code after youve completed the sign-up process. If you do not sign up before the expiration date, you must request a new code.     iRidge Access Code: [email or web address]  Expires: 10/10/2019 11:29 AM (This is the date your Fleck access code will )    4. Enter the last four digits of your Social Security Number (xxxx) and Date of Birth (mm/dd/yyyy) as indicated and click Submit. You will be taken to the next sign-up page. 5. Create a Fleck ID. This will be your Fleck login ID and cannot be changed, so think of one that is secure and easy to remember. 6. Create a Fleck password. You can change your password at any time. 7. Enter your Password Reset Question and Answer. This can be used at a later time if you forget your password. 8. Enter your e-mail address. You will receive e-mail notification when new information is available in [address] 9. Click Sign Up. You can now view and download portions of your medical record. 10. Click the Download Summary menu link to download a portable copy of your medical information. Additional Information    If you have questions, please call [phone]. Remember, Fleck is NOT to be used for urgent needs. For medical emergencies, dial 911.

## 2019-08-26 NOTE — PROGRESS NOTES
Note reviewed  Agree with plan  Electronically signed by    Chen Augustin MD  Diplomate in Sleep Medicine  [facility]

## 2019-12-04 ENCOUNTER — OFFICE VISIT (OUTPATIENT)
Dept: GYNECOLOGY | Age: 84
End: 2019-12-04

## 2019-12-04 VITALS
WEIGHT: 193.4 LBS | BODY MASS INDEX: 36.51 KG/M2 | DIASTOLIC BLOOD PRESSURE: 79 MMHG | SYSTOLIC BLOOD PRESSURE: 165 MMHG | HEIGHT: 61 IN | HEART RATE: 59 BPM

## 2019-12-04 DIAGNOSIS — Z87.412 HISTORY OF VULVAR DYSPLASIA: Primary | ICD-10-CM

## 2019-12-04 DIAGNOSIS — Z91.89 GYN EXAM FOR HIGH-RISK MEDICARE PATIENT: ICD-10-CM

## 2019-12-04 NOTE — PROGRESS NOTES
One year check up, pt reports no abnormal spotting or bleeding, pt states she has no questions or concerns for today's visit, pt wanted us to know that she had a bizarre at [employer] and was lifting things she should not have been, went to see doctor in [place] and he checked her for infection and sent her to hospital for a scan and everything was normal, Initial blood pressure reading 152/75, repeat blood pressure reading 165/79      1. Have you been to the ER, urgent care clinic since your last visit? Hospitalized since your last visit?  no    2. Have you seen or consulted any other health care providers outside of the [address] [place] [address] since your last visit? Include any pap smears or colon screening.    no

## 2019-12-04 NOTE — PROGRESS NOTES
[facility] GYNECOLOGIC ONCOLOGY  [address] [facility], [facility] Mathias Moritz [address]  [phone] [facility] [phone]      Patient ID:  Hodan Vidales  [id number]  1935/84 y.o. Visit date: 2019      HPI:   Hodan Vidales is a pleasant 82 y.o.  female with a history of ABIODUN I s/p laser therapy in  by Franc Tierney MD.  She previously used clobetasol for ?hx of lichen sclerosis. Subjective:  She reports she was hospitalized at [address] [place] [address] for a UTI. She's been treated for incontinence and given a cream and nightly pill. She does not recall the name of either, possibly a ?topical estrogen. Also started on medication for \"memory loss. \"   This may be the aricept. States she is doing better. She only rarely uses the clobetasol any longer. Still mild incontinence. She denies pain, itching, discharge, rash or lesions. She is , not sexually active. No pelvic pain. She is a former smoker, not currently smoking. Her daughter  of uncertain abdominal cancer over the summer. Active, restricted by recent ankle injury. Uses cane. 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 Wt is stable. Last PAP 2018: NILM. Normal paps dating back to , beginning of EMR.       Past Medical History:   Diagnosis Date    Arthritis     CAD (coronary artery disease), native coronary artery 2012    CHI (closed head injury) 11/3/2013    seen in ED [facility] ED after fall    Chronic anxiety     Chronic fatigue     GERD (gastroesophageal reflux disease)     Gout     Hx of colonic polyps     Hypercholesterolemia     Hypertension     Obstructive sleep apnea (adult) (pediatric) 2012    Pain in left ankle 2015    Saw ortho (4/15) - dx w/ rupture L tibialis anterior tendon     Plantar fasciitis     ABIODUN (vulval intraepithelial neoplasia) I 2012       Past Surgical History:   Procedure Laterality Date    ENDOSCOPY, COLON, DIAGNOSTIC  1999 (Brand)    HX CHOLECYSTECTOMY      lap.  HX HEART CATHETERIZATION      s/p stent    HX HEENT  3/2002    jarod. laser eye surg.     HX MOHS PROCEDURES Right 04    HX ORTHOPAEDIC      Right TKR ([facility])    HX ORTHOPAEDIC      Right wrist cyst    HX OTHER SURGICAL  5/15/14    PAP- normal    HX TONSIL AND ADENOIDECTOMY         Social History     Socioeconomic History    Marital status:      Spouse name: Not on file    Number of children: Not on file    Years of education: Not on file    Highest education level: Not on file   Occupational History    Not on file   Social Needs    Financial resource strain: Not on file    Food insecurity:     Worry: Not on file     Inability: Not on file    Transportation needs:     Medical: Not on file     Non-medical: Not on file   Tobacco Use    Smoking status: Former Smoker     Packs/day: 0.30     Years: 5.00     Pack years: 1.50     Last attempt to quit: 1970     Years since quittin.4    Smokeless tobacco: Never Used   Substance and Sexual Activity    Alcohol use: No     Alcohol/week: 0.0 standard drinks    Drug use: No    Sexual activity: Never     Partners: Male   Lifestyle    Physical activity:     Days per week: Not on file     Minutes per session: Not on file    Stress: Not on file   Relationships    Social connections:     Talks on phone: Not on file     Gets together: Not on file     Attends Synagogue service: Not on file     Active member of club or organization: Not on file     Attends meetings of clubs or organizations: Not on file     Relationship status: Not on file    Intimate partner violence:     Fear of current or ex partner: Not on file     Emotionally abused: Not on file     Physically abused: Not on file     Forced sexual activity: Not on file   Other Topics Concern    Not on file   Social History Narrative    Not on file       Family History   Problem Relation Age of Onset    Coronary Artery Disease Mother  Hypertension Mother     Heart Failure Mother         CHF    Diabetes Father     Heart Disease Father     Hypertension Sister     Heart Disease Sister     Stroke Sister     Heart Surgery Sister     Heart Disease Brother     Diabetes Brother     No Known Problems Daughter     No Known Problems Daughter     No Known Problems Daughter     No Known Problems Son     Breast Cancer Paternal Grandmother        Current Outpatient Medications on File Prior to Visit   Medication Sig Dispense Refill    donepezil (ARICEPT) 10 mg tablet       potassium chloride (K-DUR, KLOR-CON) 20 mEq tablet TAKE 1 TABLET BY MOUTH TWO  TIMES DAILY 180 Tab 1    bumetanide (BUMEX) 1 mg tablet TAKE 1 TABLET BY MOUTH TWO  TIMES DAILY 180 Tab 1    ezetimibe (ZETIA) 10 mg tablet Take  by mouth.  busPIRone (BUSPAR) 5 mg tablet TAKE 1 TABLET BY MOUTH TWO  TIMES DAILY 180 Tab 1    cranberry 500 mg capsule Take 1,000 mg by mouth as needed.  Comp. Stocking,Knee,Regular,Lrg Misc 2 Each by Does Not Apply route daily. 2 Each 1    aspirin 81 mg tablet Take 81 mg by mouth daily.  memantine (NAMENDA) 5 mg tablet       clobetasol (TEMOVATE) 0.05 % topical cream Apply  to affected area daily as needed for Skin Irritation or Itching. 60 g 2    [DISCONTINUED] nystatin (MYCOSTATIN) powder Apply  to affected area four (4) times daily. Continue use for one week after rash has gone. 15 g 1    famotidine (PEPCID) 40 mg tablet TAKE 1 TABLET BY MOUTH  DAILY 90 Tab 1    ferrous sulfate (IRON PO) Take  by mouth daily.  irbesartan (AVAPRO) 150 mg tablet Take 1 Tab by mouth nightly. 90 Tab 0    fluticasone (FLONASE) 50 mcg/actuation nasal spray USE 2 SPRAYS IN EACH  NOSTRIL EVERY DAY 48 g 1    nitroglycerin (NITROSTAT) 0.4 mg SL tablet 1 Tab by SubLINGual route every five (5) minutes as needed for Chest Pain. 1 Bottle 0    [DISCONTINUED] CINNAMON BARK (CINNAMON PO) Take 1,000 mg by mouth two (2) times a day.        No current facility-administered medications on file prior to visit. Allergies   Allergen Reactions    Bactrim [Sulfamethoprim Ds] Rash    Ciprocinonide Other (comments)     Pt states that she is not allergic to this medication. Did not tolerate Cipro (2017) - fatigue & aches    Macrobid [Nitrofurantoin Monohyd/M-Cryst] Unknown (comments)     Flu like      Naprosyn [Naproxen] Hives    Nsaids (Non-Steroidal Anti-Inflammatory Drug) Hives    Prozac [Fluoxetine] Hives    Statins-Hmg-Coa Reductase Inhibitors Myalgia     Did not tolerate simvastatin or atorvastatin    Sulfa (Sulfonamide Antibiotics) Hives    Vioxx [Rofecoxib] Hives     GI Upset    Zoloft [Sertraline] Hives         OBJECTIVE:  Female chaperone present  PHYSICAL EXAM  VITAL SIGNS: Visit Vitals  /79 (BP 1 Location: Left arm, BP Patient Position: Sitting)   Pulse (!) 59   Ht 5' 0.75\" (1.543 m)   Wt 193 lb 6.4 oz (87.7 kg)   BMI 36.84 kg/m²      GENERAL NAEL: in no apparent distress, A&O x 3   GASTROINT: soft, non-tender, without masses or organomegaly   MUSCULOSKEL: no joint tenderness, deformity or swelling   EXTREMITIES: extremities normal, atraumatic, no cyanosis. Trace edema. AI hose in place. PELVIC: External genitalia: no lesions grossly, no rash, no tissue scarring. Vulva without lesions, no loss of labial folds. BUS negative  Vaginal: atrophic mucosa, no suspicious masses, induration or nodularity   Cervix: normal without discharge or lesions. Pap taken  Adnexa: normal bimanual exam and non palpable, PSW clear   RECTAL: Deferred.  Anus/perineum w/o lesions   MALIKA SURVEY: Cervical and axillary nodes normal.   NEURO: Grossly normal     Wt Readings from Last 3 Encounters:   12/04/19 193 lb 6.4 oz (87.7 kg)   08/26/19 191 lb (86.6 kg)   01/21/19 190 lb (86.2 kg)       DATE REVIEW as available:  Lab Results   Component Value Date/Time    WBC 9.0 01/12/2018 01:51 PM    HGB 11.3 01/12/2018 01:51 PM    HCT 34.7 01/12/2018 01:51 PM PLATELET 536 1869 01:51 PM    MCV 89 2018 01:51 PM     Lab Results   Component Value Date/Time    Sodium 141 2018 01:51 PM    Potassium 3.5 2018 01:51 PM    Chloride 96 2018 01:51 PM    CO2 31 (H) 2018 01:51 PM    Anion gap 10 2017 01:54 PM    Glucose 87 2018 01:51 PM    BUN 17 2018 01:51 PM    Creatinine 0.88 2018 01:51 PM    BUN/Creatinine ratio 19 2018 01:51 PM    GFR est AA 72 2018 01:51 PM    GFR est non-AA 62 2018 01:51 PM    Calcium 10.2 2018 01:51 PM       IMPRESSION AND PLAN:  Martin Soto has a working diagnosis of ABIODUN I s/p laser therapy . Doing well, clinically JAKE. ECO        ICD-10-CM ICD-9-CM    1. History of vulvar dysplasia Z87. 412 V13.24    2. GYN exam for high-risk Medicare patient Z91.89 V72.31      V15.89        Return twelve months for exam or PRN. Reassured, no recurrence. F/u on pap. Offered referral for urogyn incontinence, but she declines today. Overall she is feeling better.         ADA Swenson  Gyn Onc

## 2019-12-04 NOTE — PATIENT INSTRUCTIONS
Dear Julissa Carvajal, It was a pleasure seeing you today. We discussed your history and present course as well as plan. Your exam today was normal.     
 
Here is our treatment plan: · Return to clinic in 12 months · We will follow up on your pap smear results If you were not given an appointment or have not made one, please call and schedule at your convenience. If you have any questions, please call or email and we will follow up with you as soon as possible. Please understand it may take some time to get back for non-urgent issues. Your patience is appreciated. Do not forget to sign-up for [email or web address] for access to your patient record, results and to contact us directly through your chart. See attached form for your registration code. [id number] [place], [place] 
[phone] [address] [place] [address]

## 2019-12-06 ENCOUNTER — HOSPITAL ENCOUNTER (OUTPATIENT)
Dept: LAB | Age: 84
Discharge: HOME | End: 2019-12-06
Payer: MEDICARE

## 2019-12-06 PROCEDURE — 88175 CYTOPATH C/V AUTO FLUID REDO: CPT

## 2021-01-13 ENCOUNTER — VIRTUAL VISIT (OUTPATIENT)
Dept: SLEEP MEDICINE | Age: 86
End: 2021-01-13
Payer: MEDICARE

## 2021-01-13 ENCOUNTER — DOCUMENTATION ONLY (OUTPATIENT)
Dept: SLEEP MEDICINE | Age: 86
End: 2021-01-13

## 2021-01-13 DIAGNOSIS — G47.33 OBSTRUCTIVE SLEEP APNEA (ADULT) (PEDIATRIC): Primary | ICD-10-CM

## 2021-01-13 PROCEDURE — 99442 PR PHYS/QHP TELEPHONE EVALUATION 11-20 MIN: CPT | Performed by: INTERNAL MEDICINE

## 2021-01-13 NOTE — PROGRESS NOTES
Dat Franklin is a 82 y.o. female, evaluated via audio-only technology on 1/13/2021 for Sleep Problem ([place] [address] [phone]. need RX for supplies )  . 12  Subjective:       I was at home while conducting this encounter. Aleshia Osuna is a 82 y.o. female seen at this telephone visit for a positive airway pressure follow-up. She reports no problems using the device. She is 97% compliant over the past 30 days. The following problems are identified:    Drowsiness no Problems exhaling no   Snoring no Forget to put on no   Mask Comfortable Yes but wants to switch to nasal- many of her friends use the nasal mask and she thinks it sounds more comfortable  Can't fall asleep no   Dry Mouth no Mask falls off no   Air Leaking At times Frequent awakenings no       Download reviewed. She admits that her sleep has improved on PAP therapy using full mask and heated tubing. Allergies   Allergen Reactions    Bactrim [Sulfamethoprim Ds] Rash    Ciprocinonide Other (comments)     Pt states that she is not allergic to this medication. Did not tolerate Cipro (2017) - fatigue & aches    Macrobid [Nitrofurantoin Monohyd/M-Cryst] Unknown (comments)     Flu like      Naprosyn [Naproxen] Hives    Nsaids (Non-Steroidal Anti-Inflammatory Drug) Hives    Prozac [Fluoxetine] Hives    Statins-Hmg-Coa Reductase Inhibitors Myalgia     Did not tolerate simvastatin or atorvastatin    Sulfa (Sulfonamide Antibiotics) Hives    Vioxx [Rofecoxib] Hives     GI Upset    Zoloft [Sertraline] Hives       She has a current medication list which includes the following prescription(s): donepezil, memantine, clobetasol, famotidine, potassium chloride, ferrous sulfate, irbesartan, bumetanide, fluticasone propionate, zetia, buspirone, nitroglycerin, cranberry, comp.stocking,knee,regular,lrg, and aspirin. .      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 [place] Sleepiness Score: 1   and Modified F.O.S.Q. Score Total / 2: 20   which reflect improved sleep quality over therapy time. A>    ICD-10-CM ICD-9-CM    1. Obstructive sleep apnea (adult) (pediatric)  G47.33 327.23 AMB SUPPLY ORDER      On Bi - Level :  16/12 cmH2O. Compliant:      yes    Therapeutic Response:  Positive    P>    she is compliant with PAP therapy and PAP continues to benefit patient and remains necessary for control of her sleep apnea. BIPAP setting - continue 16/12 cmH20   Mask change to nasal mask per patient request. She will hold on to her old full face in case she doesn't like the nasal style. I have reviewed medicare requirements regarding PAP usage  Replacement supplies ordered  * We have recommended a dedicated weight loss through appropriate diet and an exercise regimen as significant weight reduction has been shown to reduce severity of obstructive sleep apnea. * She was asked to contact our office for any problems regarding PAP therapy. * Counseling was provided regarding the importance of regular PAP use and on proper sleep hygiene and safe driving. * Re-enforced proper and regular cleaning for the device. All of her questions were addressed. Carmina Sanders, who was evaluated through a patient-initiated, synchronous (real-time) audio only encounter, and/or her healthcare decision maker, is aware that it is a billable service, with coverage as determined by her insurance carrier. She provided verbal consent to proceed: Yes.  She has not had a related appointment within my department in the past 7 days or scheduled within the next 24 hours.       Total Time: minutes: 11-20 minutes    Yesy Hancock MD

## 2021-01-13 NOTE — PATIENT INSTRUCTIONS
[address] [facility]., [place]. 1668 [facility], [address] Tel.  [phone] Fax. 100 [facility] 60 [place], [address] [facility] [address] Tel.  [phone] Fax. [phone] [address] [place] Tel.  [phone] Fax. [phone] PROPER SLEEP HYGIENE What to avoid · Do not have drinks with caffeine, such as coffee or black tea, for 8 hours before bed. · Do not smoke or use other types of tobacco near bedtime. Nicotine is a stimulant and can keep you awake. · Avoid drinking alcohol late in the evening, because it can cause you to wake in the middle of the night. · Do not eat a big meal close to bedtime. If you are hungry, eat a light snack. · Do not drink a lot of water close to bedtime, because the need to urinate may wake you up during the night. · Do not read or watch TV in bed. Use the bed only for sleeping and sexual activity. What to try · Go to bed at the same time every night, and wake up at the same time every morning. Do not take naps during the day. · Keep your bedroom quiet, dark, and cool. · Get regular exercise, but not within 3 to 4 hours of your bedtime. Justagh Billing · Sleep on a comfortable pillow and mattress. · If watching the clock makes you anxious, turn it facing away from you so you cannot see the time. · If you worry when you lie down, start a worry book. Well before bedtime, write down your worries, and then set the book and your concerns aside. · Try meditation or other relaxation techniques before you go to bed. · If you cannot fall asleep, get up and go to another room until you feel sleepy. Do something relaxing. Repeat your bedtime routine before you go to bed again. · Make your house quiet and calm about an hour before bedtime. Turn down the lights, turn off the TV, log off the computer, and turn down the volume on music. This can help you relax after a busy day. Drowsy Driving [employer] cites drowsiness as a causing factor in more than 937,608 police reported crashes annually, resulting in 76,000 injuries and 1,500 deaths. Other surveys suggest 55% of people polled have driven while drowsy in the past year, 23% had fallen asleep but not crashed, 3% crashed, and 2% had and accident due to drowsy driving. Who is at risk? Young Drivers: One study of drowsy driving accidents states that 55% of the drivers were under 25 years. Of those, 75% were male. Shift Workers and Travelers: People who work overnight or travel across time zones frequently are at higher risk of experiencing Circadian Rhythm Disorders. They are trying to work and function when their body is programed to sleep. Sleep Deprived: Lack of sleep has a serious impact on your ability to pay attention or focus on a task. Consistently getting less than the average of 8 hours your body needs creates partial or cumulative sleep deprivation. Untreated Sleep Disorders: Sleep Apnea, Narcolepsy, R.L.S., and other sleep disorders (untreated) prevent a person from getting enough restful sleep. This leads to excessive daytime sleepiness and increases the risk for drowsy driving accidents by up to 7 times. Medications / Alcohol: Even over the counter medications can cause drowsiness. Medications that impair a drivers attention should have a warning label. Alcohol naturally makes you sleepy and on its own can cause accidents. Combined with excessive drowsiness its effects are amplified. Signs of Drowsy Driving: * You don't remember driving the last few miles * You may drift out of your yassine * You are unable to focus and your thoughts wander * You may yawn more often than normal 
 * You have difficulty keeping your eyes open / nodding off * Missing traffic signs, speeding, or tailgating Prevention-  
 Good sleep hygiene, lifestyle and behavioral choices have the most impact on drowsy driving. There is no substitute for sleep and the average person requires 8 hours nightly. If you find yourself driving drowsy, stop and sleep. Consider the sleep hygiene tips provided during your visit as well. Medication Refill Policy: Refills for all medications require 1 week advance notice. Please have your pharmacy fax a refill request. We are unable to fax, or call in \"controled substance\" medications and you will need to pick these prescriptions up from our office. [employer]r Activation Thank you for requesting access to [employer]. Please follow the instructions below to securely access and download your online medical record. [employer] allows you to send messages to your doctor, view your test results, renew your prescriptions, schedule appointments, and more. How Do I Sign Up? 1. In your internet browser, go to [email or web address]. 2. Click on the First Time User? Click Here link in the Sign In box. You will see the New Member Sign Up page. 3. Enter your [employer]r Access Code exactly as it appears below. You will not need to use this code after youve completed the sign-up process. If you do not sign up before the expiration date, you must request a new code. Gingr Access Code: [email or web address] Expires: 2021 10:46 AM (This is the date your G[employer]r access code will ) 4. Enter the last four digits of your Social Security Number (xxxx) and Date of Birth (mm/dd/yyyy) as indicated and click Submit. You will be taken to the next sign-up page. 5. Create a Gingr ID. This will be your Gingr login ID and cannot be changed, so think of one that is secure and easy to remember. 6. Create a Gingr password. You can change your password at any time. 7. Enter your Password Reset Question and Answer. This can be used at a later time if you forget your password. 8. Enter your e-mail address. You will receive e-mail notification when new information is available in [address]. 9. Click Sign Up. You can now view and download portions of your medical record. 10. Click the Download Summary menu link to download a portable copy of your medical information. Additional Information If you have questions, please call [phone]. Remember, [employer] is NOT to be used for urgent needs. For medical emergencies, dial 911.

## 2021-03-09 NOTE — PATIENT INSTRUCTIONS
Please return in 4 months. FAMILY HISTORY:  FH: cancer, mother  FH: type 2 diabetes, father, grandparents, aunt

## 2021-08-16 ENCOUNTER — TELEPHONE (OUTPATIENT)
Dept: SLEEP MEDICINE | Age: 86
End: 2021-08-16

## 2021-08-16 NOTE — TELEPHONE ENCOUNTER
PATIENT RESPONSE / FOLLOW UP NEEDS  Patient plans to discontinue use of device    RECALL EDUCATION PROVIDED    Are you aware of why there is a recall? No  You can visit the [facility] for more information. Recall is related specifically to the type of foam used to reduce the noise made by the device. Over time, the foam may break down into small black particles that have the potential to be inhaled. Felisha Angulo has also done testing that shows breakdown of the foam can release unsafe gases. In 2020, the complaint rate for foam particles was low (0.03%)  NO REPORTS OF DEATH  NO COMPLAINTS OF CHEMICAL EXPOSURE  Potential risks include  Headache  Irritation of the airway, skin, or eyes  Asthma  Nausea or Vomiting    RECOMMENDATIONS:    The first step is to make sure we have your device added to the [employer] in order to begin a claim. Have you already been contacted by [employer] or your medical equipment provider? No.    Based on Jose communication, you should stop using your equipment, but it is important that you consider the risks of both continuing and discontinuing the use of your device to treat your sleep apnea. If you choose to continue using your machine, it is at your own discretion. There is not a clear answer, and this is a difficult situation. As for actions, you should register your device by phone or access the website as explained earlier. It is also recommended that you DO NOT USE any type of CPAP cleaning machines or harsh chemicals. We have heard that the use of ozone or other cleaning devices may be contributing to the problem by increasing the rate at which the foam can potentially break down. Therefore, we advise you to follow the 's instructions to clean your tube, tank, and mask with mild soap and water. If you are using ozone products to clean your PAP device, [employer] advises you to STOP.      If you choose to stop using your device, you may benefit from the use of alternate therapies such as  Positional changes  Avoid sleeping flat or on your back, elevate the head of your bed with pillows or other positional devices such as slumber bumper, sleep noodle, wedge. Sleeping in a recliner  Mandibular devices that reposition jaw for better airflow. You can get a referral for this from you doctor if you would like to follow up with this option. Use of inline bacteria filter. Both Jose and the FDA have noted that additional bacterial filters are not designed or intended for use with APAP devices and may cause device issues. Additionally, these filters do not resolve vapor or fume concerns from the foam in the devices. Because these are not FDA approved, we cannot order these filters for you, but other patients have elected to purchase them on their own through a medical equipment company or online. If you chose to use an inline filter, it is important that you research how it may affect the performance of your device. If the filter becomes clogged, you will not be getting the prescribed amount of pressure, and it could further change how the CPAP operates. COMMUNICATION  The fastest way for us to communicate with you is through [address] Are you signed up and active with this? No  Do we have your current Email Address?  yes  Would you like to give permission to receive information via email and text as well? yes

## 2021-08-16 NOTE — TELEPHONE ENCOUNTER
Patient called into the office on 08/16/2021 at 1:08pm stating that she has a device on the [place] recall list, patient was added to registry and states she has been feeling bad every since she found out about the recall. Patient can be reached at [phone].